# Patient Record
Sex: MALE | Race: WHITE | Employment: OTHER
[De-identification: names, ages, dates, MRNs, and addresses within clinical notes are randomized per-mention and may not be internally consistent; named-entity substitution may affect disease eponyms.]

---

## 2017-01-06 DIAGNOSIS — S82.391B: Primary | ICD-10-CM

## 2017-01-10 ENCOUNTER — OFFICE VISIT (OUTPATIENT)
Dept: ORTHOPEDIC SURGERY | Facility: CLINIC | Age: 82
End: 2017-01-10

## 2017-01-10 VITALS — WEIGHT: 198 LBS | BODY MASS INDEX: 28.35 KG/M2 | HEIGHT: 70 IN

## 2017-01-10 DIAGNOSIS — S82.391B: Primary | ICD-10-CM

## 2017-01-10 PROCEDURE — 99024 POSTOP FOLLOW-UP VISIT: CPT | Performed by: ORTHOPAEDIC SURGERY

## 2017-01-10 ASSESSMENT — ENCOUNTER SYMPTOMS
EYE DISCHARGE: 0
FACIAL SWELLING: 0
EYE REDNESS: 0
SHORTNESS OF BREATH: 0
NAUSEA: 0
CONSTIPATION: 0
DIARRHEA: 0
VOMITING: 0
COLOR CHANGE: 0
BACK PAIN: 0
CHEST TIGHTNESS: 0

## 2017-02-02 DIAGNOSIS — S82.391B: Primary | ICD-10-CM

## 2017-02-07 ENCOUNTER — OFFICE VISIT (OUTPATIENT)
Dept: ORTHOPEDIC SURGERY | Facility: CLINIC | Age: 82
End: 2017-02-07

## 2017-02-07 VITALS — HEIGHT: 70 IN | WEIGHT: 196.21 LBS | BODY MASS INDEX: 28.09 KG/M2

## 2017-02-07 DIAGNOSIS — S82.191B: Primary | ICD-10-CM

## 2017-02-07 PROCEDURE — 99024 POSTOP FOLLOW-UP VISIT: CPT | Performed by: ORTHOPAEDIC SURGERY

## 2017-02-07 PROCEDURE — 1123F ACP DISCUSS/DSCN MKR DOCD: CPT | Performed by: ORTHOPAEDIC SURGERY

## 2017-02-07 PROCEDURE — G8484 FLU IMMUNIZE NO ADMIN: HCPCS | Performed by: ORTHOPAEDIC SURGERY

## 2017-02-07 PROCEDURE — G8428 CUR MEDS NOT DOCUMENT: HCPCS | Performed by: ORTHOPAEDIC SURGERY

## 2017-02-07 PROCEDURE — G8420 CALC BMI NORM PARAMETERS: HCPCS | Performed by: ORTHOPAEDIC SURGERY

## 2017-02-07 PROCEDURE — 4040F PNEUMOC VAC/ADMIN/RCVD: CPT | Performed by: ORTHOPAEDIC SURGERY

## 2017-02-07 PROCEDURE — 1036F TOBACCO NON-USER: CPT | Performed by: ORTHOPAEDIC SURGERY

## 2017-03-14 ENCOUNTER — OFFICE VISIT (OUTPATIENT)
Dept: ORTHOPEDIC SURGERY | Age: 82
End: 2017-03-14
Payer: MEDICARE

## 2017-03-14 DIAGNOSIS — S82.191B: Primary | ICD-10-CM

## 2017-03-14 PROCEDURE — G8428 CUR MEDS NOT DOCUMENT: HCPCS | Performed by: ORTHOPAEDIC SURGERY

## 2017-03-14 PROCEDURE — 1123F ACP DISCUSS/DSCN MKR DOCD: CPT | Performed by: ORTHOPAEDIC SURGERY

## 2017-03-14 PROCEDURE — 99213 OFFICE O/P EST LOW 20 MIN: CPT | Performed by: ORTHOPAEDIC SURGERY

## 2017-03-14 PROCEDURE — G8420 CALC BMI NORM PARAMETERS: HCPCS | Performed by: ORTHOPAEDIC SURGERY

## 2017-03-14 PROCEDURE — 1036F TOBACCO NON-USER: CPT | Performed by: ORTHOPAEDIC SURGERY

## 2017-03-14 PROCEDURE — 4040F PNEUMOC VAC/ADMIN/RCVD: CPT | Performed by: ORTHOPAEDIC SURGERY

## 2017-03-14 PROCEDURE — G8484 FLU IMMUNIZE NO ADMIN: HCPCS | Performed by: ORTHOPAEDIC SURGERY

## 2017-03-14 ASSESSMENT — ENCOUNTER SYMPTOMS
VOMITING: 0
SHORTNESS OF BREATH: 0
NAUSEA: 0

## 2017-06-21 ENCOUNTER — OFFICE VISIT (OUTPATIENT)
Dept: ORTHOPEDIC SURGERY | Age: 82
End: 2017-06-21
Payer: MEDICARE

## 2017-06-21 VITALS — BODY MASS INDEX: 28.09 KG/M2 | HEIGHT: 70 IN | WEIGHT: 196.21 LBS

## 2017-06-21 DIAGNOSIS — L03.116 CELLULITIS OF LEFT LOWER EXTREMITY: Primary | ICD-10-CM

## 2017-06-21 PROCEDURE — 4040F PNEUMOC VAC/ADMIN/RCVD: CPT | Performed by: ORTHOPAEDIC SURGERY

## 2017-06-21 PROCEDURE — G8427 DOCREV CUR MEDS BY ELIG CLIN: HCPCS | Performed by: ORTHOPAEDIC SURGERY

## 2017-06-21 PROCEDURE — G8419 CALC BMI OUT NRM PARAM NOF/U: HCPCS | Performed by: ORTHOPAEDIC SURGERY

## 2017-06-21 PROCEDURE — 1036F TOBACCO NON-USER: CPT | Performed by: ORTHOPAEDIC SURGERY

## 2017-06-21 PROCEDURE — 1123F ACP DISCUSS/DSCN MKR DOCD: CPT | Performed by: ORTHOPAEDIC SURGERY

## 2017-06-21 PROCEDURE — 99212 OFFICE O/P EST SF 10 MIN: CPT | Performed by: ORTHOPAEDIC SURGERY

## 2017-06-21 RX ORDER — DOXYCYCLINE HYCLATE 100 MG
100 TABLET ORAL 2 TIMES DAILY
Qty: 20 TABLET | Refills: 0 | Status: SHIPPED | OUTPATIENT
Start: 2017-06-21 | End: 2017-07-01

## 2017-06-29 ENCOUNTER — OFFICE VISIT (OUTPATIENT)
Dept: ORTHOPEDIC SURGERY | Age: 82
End: 2017-06-29
Payer: MEDICARE

## 2017-06-29 VITALS — HEIGHT: 70 IN | WEIGHT: 196.21 LBS | BODY MASS INDEX: 28.09 KG/M2

## 2017-06-29 DIAGNOSIS — S82.891D CLOSED RIGHT ANKLE FRACTURE, WITH ROUTINE HEALING, SUBSEQUENT ENCOUNTER: Primary | ICD-10-CM

## 2017-06-29 PROCEDURE — G8419 CALC BMI OUT NRM PARAM NOF/U: HCPCS | Performed by: ORTHOPAEDIC SURGERY

## 2017-06-29 PROCEDURE — 1123F ACP DISCUSS/DSCN MKR DOCD: CPT | Performed by: ORTHOPAEDIC SURGERY

## 2017-06-29 PROCEDURE — G8428 CUR MEDS NOT DOCUMENT: HCPCS | Performed by: ORTHOPAEDIC SURGERY

## 2017-06-29 PROCEDURE — 1036F TOBACCO NON-USER: CPT | Performed by: ORTHOPAEDIC SURGERY

## 2017-06-29 PROCEDURE — 4040F PNEUMOC VAC/ADMIN/RCVD: CPT | Performed by: ORTHOPAEDIC SURGERY

## 2017-06-29 PROCEDURE — 99212 OFFICE O/P EST SF 10 MIN: CPT | Performed by: ORTHOPAEDIC SURGERY

## 2017-06-29 RX ORDER — DOXYCYCLINE HYCLATE 100 MG
100 TABLET ORAL 2 TIMES DAILY
Qty: 40 TABLET | Refills: 0 | Status: SHIPPED | OUTPATIENT
Start: 2017-06-29 | End: 2017-07-03 | Stop reason: CLARIF

## 2017-07-03 RX ORDER — DOXYCYCLINE HYCLATE 100 MG
100 TABLET ORAL 2 TIMES DAILY
Qty: 40 TABLET | Refills: 0 | Status: SHIPPED | OUTPATIENT
Start: 2017-07-03 | End: 2017-07-23

## 2024-08-05 ENCOUNTER — HOSPITAL ENCOUNTER (OUTPATIENT)
Dept: HOSPITAL 101 - LAB | Age: 89
Discharge: HOME | End: 2024-08-05
Payer: MEDICARE

## 2024-08-05 DIAGNOSIS — E55.9: ICD-10-CM

## 2024-08-05 DIAGNOSIS — I12.9: ICD-10-CM

## 2024-08-05 DIAGNOSIS — Z86.73: ICD-10-CM

## 2024-08-05 DIAGNOSIS — N18.31: ICD-10-CM

## 2024-08-05 DIAGNOSIS — R73.03: Primary | ICD-10-CM

## 2024-08-05 LAB
ADD MANUAL DIFF: NO
ALANINE AMINOTRANSFERASE: 13 U/L (ref 16–63)
ALBUMIN GLOBULIN RATIO: 1
ALBUMIN LEVEL: 3.4 G/DL (ref 3.4–5)
ALKALINE PHOSPHATASE: 85 U/L (ref 46–116)
ANION GAP: 13.3
ASPARTATE AMINO TRANSFERASE: 15 U/L (ref 15–37)
BLOOD UREA NITROGEN: 24 MG/DL (ref 7–18)
CALCIUM: 8.8 MG/DL (ref 8.5–10.1)
CARBON DIOXIDE: 27.6 MMOL/L (ref 21–32)
CHLORIDE: 103 MMOL/L (ref 98–107)
CO2 BLD-SCNC: 27.6 MMOL/L (ref 21–32)
ESTIMATED GFR (AFRICAN AMERICA: 55 (ref 60–?)
ESTIMATED GFR (NON-AFRICAN AME: 46 (ref 60–?)
GLOBULIN: 3.4 G/DL
GLUCOSE BLD-MCNC: 198 MG/DL (ref 74–106)
HCT VFR BLD CALC: 43.6 % (ref 42–54)
HEMATOCRIT: 43.6 % (ref 42–54)
HEMOGLOBIN: 13.9 G/DL (ref 14–18)
IMMATURE GRANULOCYTES ABS AUTO: 0.02 10^3/UL (ref 0–0.03)
IMMATURE GRANULOCYTES PCT AUTO: 0.3 % (ref 0–0.5)
LYMPHOCYTES  ABSOLUTE AUTO: 0.9 10^3/UL (ref 1.2–3.8)
MCV RBC: 93 FL (ref 80–94)
MEAN CORPUSCULAR HEMOGLOBIN: 29.6 PG (ref 25.9–34)
MEAN CORPUSCULAR HGB CONC: 31.9 G/DL (ref 29.9–35.2)
MEAN CORPUSCULAR VOLUME: 93 FL (ref 80–94)
PLATELET # BLD: 253 10^3/UL (ref 150–450)
PLATELET COUNT: 253 10^3/UL (ref 150–450)
POTASSIUM SERPLBLD-SCNC: 3.9 MMOL/L (ref 3.5–5.1)
POTASSIUM: 3.9 MMOL/L (ref 3.5–5.1)
RED BLOOD COUNT: 4.69 10^6/UL (ref 4.7–6.1)
SODIUM BLD-SCNC: 140 MMOL/L (ref 136–145)
SODIUM: 140 MMOL/L (ref 136–145)
THYROID STIMULATING HORMONE: 3.84 UIU/ML (ref 0.36–3.74)
TOTAL PROTEIN: 6.8 G/DL (ref 6.4–8.2)
WBC # BLD: 7.3 10^3/UL (ref 4–11)
WHITE BLOOD COUNT: 7.3 10^3/UL (ref 4–11)

## 2024-08-05 PROCEDURE — 36415 COLL VENOUS BLD VENIPUNCTURE: CPT

## 2024-08-05 PROCEDURE — 84443 ASSAY THYROID STIM HORMONE: CPT

## 2024-08-05 PROCEDURE — 85025 COMPLETE CBC W/AUTO DIFF WBC: CPT

## 2024-08-05 PROCEDURE — 80053 COMPREHEN METABOLIC PANEL: CPT

## 2024-09-25 ENCOUNTER — HOSPITAL ENCOUNTER (OUTPATIENT)
Dept: HOSPITAL 101 - LAB | Age: 89
Discharge: HOME | End: 2024-09-25
Payer: MEDICARE

## 2024-09-25 DIAGNOSIS — Z51.81: ICD-10-CM

## 2024-09-25 DIAGNOSIS — R41.0: Primary | ICD-10-CM

## 2024-09-25 LAB
ADD MANUAL DIFF: NO
ALANINE AMINOTRANSFERASE: 12 U/L (ref 16–63)
ALBUMIN GLOBULIN RATIO: 0.9
ALBUMIN LEVEL: 3.2 G/DL (ref 3.4–5)
ALKALINE PHOSPHATASE: 91 U/L (ref 46–116)
ANION GAP: 10.3
ASPARTATE AMINO TRANSFERASE: 15 U/L (ref 15–37)
BLOOD UREA NITROGEN: 22 MG/DL (ref 7–18)
CALCIUM: 8.6 MG/DL (ref 8.5–10.1)
CARBON DIOXIDE: 28.3 MMOL/L (ref 21–32)
CHLORIDE: 104 MMOL/L (ref 98–107)
CO2 BLD-SCNC: 28.3 MMOL/L (ref 21–32)
ESTIMATED GFR (AFRICAN AMERICA: >60 (ref 60–?)
ESTIMATED GFR (NON-AFRICAN AME: 54 (ref 60–?)
GLOBULIN: 3.5 G/DL
GLUCOSE BLD-MCNC: 220 MG/DL (ref 74–106)
GLUCOSE URINE UA: NEGATIVE MG/DL
HCT VFR BLD CALC: 39.6 % (ref 42–54)
HEMATOCRIT: 39.6 % (ref 42–54)
HEMOGLOBIN: 12.7 G/DL (ref 14–18)
IMMATURE GRANULOCYTES ABS AUTO: 0.01 10^3/UL (ref 0–0.03)
IMMATURE GRANULOCYTES PCT AUTO: 0.2 % (ref 0–0.5)
LYMPHOCYTES  ABSOLUTE AUTO: 0.6 10^3/UL (ref 1.2–3.8)
MCV RBC: 91 FL (ref 80–94)
MEAN CORPUSCULAR HEMOGLOBIN: 29.2 PG (ref 25.9–34)
MEAN CORPUSCULAR HGB CONC: 32.1 G/DL (ref 29.9–35.2)
MEAN CORPUSCULAR VOLUME: 91 FL (ref 80–94)
PLATELET # BLD: 234 10^3/UL (ref 150–450)
PLATELET COUNT: 234 10^3/UL (ref 150–450)
POTASSIUM SERPLBLD-SCNC: 3.6 MMOL/L (ref 3.5–5.1)
POTASSIUM: 3.6 MMOL/L (ref 3.5–5.1)
RED BLOOD COUNT: 4.35 10^6/UL (ref 4.7–6.1)
SODIUM BLD-SCNC: 139 MMOL/L (ref 136–145)
SODIUM: 139 MMOL/L (ref 136–145)
TOTAL PROTEIN: 6.7 G/DL (ref 6.4–8.2)
URINE CULTURE INDICATED: YES
WBC # BLD: 5.1 10^3/UL (ref 4–11)
WHITE BLOOD COUNT: 5.1 10^3/UL (ref 4–11)

## 2024-09-25 PROCEDURE — 80053 COMPREHEN METABOLIC PANEL: CPT

## 2024-09-25 PROCEDURE — 87186 SC STD MICRODIL/AGAR DIL: CPT

## 2024-09-25 PROCEDURE — 36415 COLL VENOUS BLD VENIPUNCTURE: CPT

## 2024-09-25 PROCEDURE — 81001 URINALYSIS AUTO W/SCOPE: CPT

## 2024-09-25 PROCEDURE — 87086 URINE CULTURE/COLONY COUNT: CPT

## 2024-09-25 PROCEDURE — 87150 DNA/RNA AMPLIFIED PROBE: CPT

## 2024-09-25 PROCEDURE — 85025 COMPLETE CBC W/AUTO DIFF WBC: CPT

## 2024-10-25 ENCOUNTER — HOSPITAL ENCOUNTER (OUTPATIENT)
Dept: HOSPITAL 101 - ER | Age: 89
Setting detail: OBSERVATION
LOS: 1 days | Discharge: NURSING, REHAB OR LONG-TERM CARE | End: 2024-10-26
Payer: MEDICARE

## 2024-10-25 VITALS — OXYGEN SATURATION: 95 %

## 2024-10-25 VITALS — SYSTOLIC BLOOD PRESSURE: 150 MMHG | OXYGEN SATURATION: 98 % | DIASTOLIC BLOOD PRESSURE: 64 MMHG | HEART RATE: 91 BPM

## 2024-10-25 VITALS — HEART RATE: 81 BPM | OXYGEN SATURATION: 95 % | SYSTOLIC BLOOD PRESSURE: 152 MMHG | DIASTOLIC BLOOD PRESSURE: 58 MMHG

## 2024-10-25 VITALS — DIASTOLIC BLOOD PRESSURE: 79 MMHG | SYSTOLIC BLOOD PRESSURE: 186 MMHG

## 2024-10-25 VITALS
OXYGEN SATURATION: 95 % | SYSTOLIC BLOOD PRESSURE: 170 MMHG | TEMPERATURE: 97.34 F | HEART RATE: 83 BPM | DIASTOLIC BLOOD PRESSURE: 90 MMHG

## 2024-10-25 VITALS — HEART RATE: 94 BPM | DIASTOLIC BLOOD PRESSURE: 76 MMHG | OXYGEN SATURATION: 94 % | SYSTOLIC BLOOD PRESSURE: 158 MMHG

## 2024-10-25 VITALS — OXYGEN SATURATION: 94 %

## 2024-10-25 VITALS
TEMPERATURE: 98.24 F | OXYGEN SATURATION: 93 % | SYSTOLIC BLOOD PRESSURE: 152 MMHG | DIASTOLIC BLOOD PRESSURE: 65 MMHG | HEART RATE: 96 BPM

## 2024-10-25 VITALS — HEART RATE: 86 BPM | OXYGEN SATURATION: 96 % | SYSTOLIC BLOOD PRESSURE: 183 MMHG | DIASTOLIC BLOOD PRESSURE: 80 MMHG

## 2024-10-25 VITALS — HEART RATE: 239 BPM | DIASTOLIC BLOOD PRESSURE: 61 MMHG | SYSTOLIC BLOOD PRESSURE: 161 MMHG | OXYGEN SATURATION: 95 %

## 2024-10-25 VITALS
TEMPERATURE: 98.06 F | OXYGEN SATURATION: 98 % | HEART RATE: 96 BPM | SYSTOLIC BLOOD PRESSURE: 150 MMHG | DIASTOLIC BLOOD PRESSURE: 66 MMHG

## 2024-10-25 VITALS — OXYGEN SATURATION: 99 %

## 2024-10-25 VITALS — OXYGEN SATURATION: 94 % | HEART RATE: 258 BPM

## 2024-10-25 VITALS
HEART RATE: 83 BPM | DIASTOLIC BLOOD PRESSURE: 87 MMHG | SYSTOLIC BLOOD PRESSURE: 174 MMHG | OXYGEN SATURATION: 94 % | TEMPERATURE: 98 F

## 2024-10-25 VITALS — DIASTOLIC BLOOD PRESSURE: 70 MMHG | SYSTOLIC BLOOD PRESSURE: 171 MMHG

## 2024-10-25 VITALS — OXYGEN SATURATION: 96 %

## 2024-10-25 VITALS — SYSTOLIC BLOOD PRESSURE: 151 MMHG | DIASTOLIC BLOOD PRESSURE: 69 MMHG

## 2024-10-25 VITALS — OXYGEN SATURATION: 97 %

## 2024-10-25 VITALS — OXYGEN SATURATION: 98 %

## 2024-10-25 VITALS
SYSTOLIC BLOOD PRESSURE: 171 MMHG | TEMPERATURE: 97.52 F | HEART RATE: 97 BPM | DIASTOLIC BLOOD PRESSURE: 70 MMHG | OXYGEN SATURATION: 97 %

## 2024-10-25 VITALS — BODY MASS INDEX: 25 KG/M2 | BODY MASS INDEX: 25.8 KG/M2

## 2024-10-25 VITALS — OXYGEN SATURATION: 94 % | HEART RATE: 248 BPM

## 2024-10-25 VITALS — SYSTOLIC BLOOD PRESSURE: 151 MMHG | DIASTOLIC BLOOD PRESSURE: 55 MMHG | HEART RATE: 100 BPM | OXYGEN SATURATION: 96 %

## 2024-10-25 DIAGNOSIS — G31.84: ICD-10-CM

## 2024-10-25 DIAGNOSIS — I12.9: ICD-10-CM

## 2024-10-25 DIAGNOSIS — G20.B2: ICD-10-CM

## 2024-10-25 DIAGNOSIS — N35.912: ICD-10-CM

## 2024-10-25 DIAGNOSIS — Z86.73: ICD-10-CM

## 2024-10-25 DIAGNOSIS — N13.6: Primary | ICD-10-CM

## 2024-10-25 DIAGNOSIS — N18.31: ICD-10-CM

## 2024-10-25 LAB
ADD MANUAL DIFF: NO
ANION GAP: 16
BLOOD UREA NITROGEN: 28 MG/DL (ref 7–18)
CALCIUM: 9.3 MG/DL (ref 8.5–10.1)
CARBON DIOXIDE: 23.5 MMOL/L (ref 21–32)
CAST SEEN?: (no result) #/LPF
CHLORIDE: 106 MMOL/L (ref 98–107)
CO2 BLD-SCNC: 23.5 MMOL/L (ref 21–32)
ESTIMATED GFR (AFRICAN AMERICA: 48
ESTIMATED GFR (NON-AFRICAN AME: 39
GLUCOSE BLD-MCNC: 143 MG/DL (ref 74–106)
GLUCOSE URINE UA: NEGATIVE MG/DL
HCT VFR BLD CALC: 42.5 % (ref 42–54)
HEMATOCRIT: 42.5 % (ref 42–54)
HEMOGLOBIN: 14.1 G/DL (ref 14–18)
IMMATURE GRANULOCYTES ABS AUTO: 0.05 10^3/UL (ref 0–0.03)
IMMATURE GRANULOCYTES PCT AUTO: 0.4 % (ref 0–0.5)
LACTATE/LACTIC ACID: 1.2 MMOL/L (ref 0.4–2)
LYMPHOCYTES  ABSOLUTE AUTO: 1 10^3/UL (ref 1.2–3.8)
MCV RBC: 89.3 FL (ref 80–94)
MEAN CORPUSCULAR HEMOGLOBIN: 29.6 PG (ref 25.9–34)
MEAN CORPUSCULAR HGB CONC: 33.2 G/DL (ref 29.9–35.2)
MEAN CORPUSCULAR VOLUME: 89.3 FL (ref 80–94)
PLATELET # BLD: 251 10^3/UL (ref 150–450)
PLATELET COUNT: 251 10^3/UL (ref 150–450)
POTASSIUM SERPLBLD-SCNC: 4.5 MMOL/L (ref 3.5–5.1)
POTASSIUM: 4.5 MMOL/L (ref 3.5–5.1)
RED BLOOD COUNT: 4.76 10^6/UL (ref 4.7–6.1)
SODIUM BLD-SCNC: 141 MMOL/L (ref 136–145)
SODIUM: 141 MMOL/L (ref 136–145)
URINE CULTURE INDICATED: YES
WBC # BLD: 11.9 10^3/UL (ref 4–11)
WHITE BLOOD COUNT: 11.9 10^3/UL (ref 4–11)

## 2024-10-25 PROCEDURE — 96365 THER/PROPH/DIAG IV INF INIT: CPT

## 2024-10-25 PROCEDURE — 52356 CYSTO/URETERO W/LITHOTRIPSY: CPT

## 2024-10-25 PROCEDURE — 87086 URINE CULTURE/COLONY COUNT: CPT

## 2024-10-25 PROCEDURE — 82365 CALCULUS SPECTROSCOPY: CPT

## 2024-10-25 PROCEDURE — 83605 ASSAY OF LACTIC ACID: CPT

## 2024-10-25 PROCEDURE — 99285 EMERGENCY DEPT VISIT HI MDM: CPT

## 2024-10-25 PROCEDURE — 85007 BL SMEAR W/DIFF WBC COUNT: CPT

## 2024-10-25 PROCEDURE — 76000 FLUOROSCOPY <1 HR PHYS/QHP: CPT

## 2024-10-25 PROCEDURE — 85025 COMPLETE CBC W/AUTO DIFF WBC: CPT

## 2024-10-25 PROCEDURE — 85027 COMPLETE CBC AUTOMATED: CPT

## 2024-10-25 PROCEDURE — 36415 COLL VENOUS BLD VENIPUNCTURE: CPT

## 2024-10-25 PROCEDURE — 80048 BASIC METABOLIC PNL TOTAL CA: CPT

## 2024-10-25 PROCEDURE — 99999: CPT

## 2024-10-25 PROCEDURE — 93005 ELECTROCARDIOGRAM TRACING: CPT

## 2024-10-25 PROCEDURE — 80053 COMPREHEN METABOLIC PANEL: CPT

## 2024-10-25 PROCEDURE — 74176 CT ABD & PELVIS W/O CONTRAST: CPT

## 2024-10-25 PROCEDURE — G0378 HOSPITAL OBSERVATION PER HR: HCPCS

## 2024-10-25 PROCEDURE — 96375 TX/PRO/DX INJ NEW DRUG ADDON: CPT

## 2024-10-25 PROCEDURE — 94761 N-INVAS EAR/PLS OXIMETRY MLT: CPT

## 2024-10-25 PROCEDURE — 81001 URINALYSIS AUTO W/SCOPE: CPT

## 2024-10-25 RX ADMIN — MORPHINE SULFATE 2 MG: 2 INJECTION, SOLUTION INTRAMUSCULAR; INTRAVENOUS at 21:44

## 2024-10-25 RX ADMIN — HYDRALAZINE HYDROCHLORIDE 10 MG: 20 INJECTION, SOLUTION INTRAMUSCULAR; INTRAVENOUS at 13:19

## 2024-10-25 RX ADMIN — AMANTADINE HYDROCHLORIDE 100 MG: 100 CAPSULE ORAL at 21:42

## 2024-10-25 RX ADMIN — MORPHINE SULFATE 2 MG: 2 INJECTION, SOLUTION INTRAMUSCULAR; INTRAVENOUS at 10:22

## 2024-10-25 RX ADMIN — TAMSULOSIN HYDROCHLORIDE 0.4 MG: 0.4 CAPSULE ORAL at 10:22

## 2024-10-25 RX ADMIN — CEFAZOLIN SODIUM 100 GM: 1 SOLUTION INTRAVENOUS at 08:50

## 2024-10-25 NOTE — ED_ITS
HPI - Abdominal Pain    
General    
Chief Complaint: Abdominal Pain    
Stated Complaint: OTHER    
Time Seen by Provider: 10/25/24 05:29    
Source: patient    
Mode of arrival: ambulance    
Limitations: no limitations    
History of Present Illness    
HPI narrative:     
89-year-old male presented to the emergency department and was initially seen by  
Dr. Tinsley and signed out to me after discussing the case with him thoroughly.    
Please see his full history and physical exam.    
Related Data    
                                Home Medications    
    
    
    
?Medication ?Instructions ?Recorded ?Confirmed    
     
amantadine HCl 100 mg capsule 100 mg PO DAILY 10/22/24 10/22/24    
     
amlodipine 10 mg tablet 10 mg PO DAILY 10/22/24 10/22/24    
     
aspirin 81 mg capsule 81 mg PO DAILY 10/22/24 10/22/24    
     
carbidopa 25 mg-levodopa 250 mg 1 tab PO BID 10/22/24 10/22/24    
    
tablet       
     
cholecalciferol (vitamin D3) 10 400 unit PO DAILY 10/22/24 10/22/24    
    
mcg (400 unit) capsule       
     
polyethylene glycol 3350 17 17 g PO DAILY 10/24/24 10/24/24    
    
gram/dose oral powder (Miralax)       
     
tolterodine 4 mg capsule,extended 4 mg PO DAILY 10/24/24 10/24/24    
    
release 24 hr       
    
    
    
                                    Allergies    
    
    
    
Allergy/AdvReac Type Severity Reaction Status Date / Time    
     
No Known Drug Allergies Allergy   Verified 10/22/24 14:35    
    
    
    
    
[facility]    
Medical History (Updated 10/25/24 @ 08:42 by Kristopher Cleveland MD)    
    
Anemia    
   ?D64.9 - Anemia, unspecified (ICD-10)    
Hard of hearing    
   ?H91.90 - Unspecified hearing loss, unspecified ear (ICD-10)    
History of external beam radiation therapy    
   ?Z92.3 - Personal history of irradiation (ICD-10)    
Urethral stricture    
   ?N35.919 - Unspecified urethral stricture, male, unspecified site (ICD-10)    
Stroke    
   ?I63.9 - Cerebral infarction, unspecified (ICD-10)    
Parkinson disease    
   ?G20.A1 - Parkinson's disease without dyskinesia, without mention of   
   fluctuations (ICD-10)    
Microscopic hematuria    
   ?R31.29 - Other microscopic hematuria (ICD-10)    
Hypertension    
   ?I10 - Essential (primary) hypertension (ICD-10)    
Hyperlipidemia    
   ?E78.5 - Hyperlipidemia, unspecified (ICD-10)    
Prostate cancer    
   ?C61 - Malignant neoplasm of prostate (ICD-10)    
Enlarged prostate    
   ?N40.0 - Benign prostatic hyperplasia without lower urinary tract symptoms   
   (ICD-10)    
    
    
Surgical History (Updated 10/22/24 @ 14:34 by Stephanie Lua RN)    
    
Hx of transurethral resection of prostate    
   ?Z98.890 - Other specified postprocedural states (ICD-10)    
   ?Z90.79 - Acquired absence of other genital organ(s) (ICD-10)    
H/O partial resection of colon    
   ?Z90.49 - Acquired absence of other specified parts of digestive tract (ICD-  
   10)    
Hx of cholecystectomy    
   ?Z90.49 - Acquired absence of other specified parts of digestive tract (ICD-  
   10)    
History of appendectomy    
   ?Z90.49 - Acquired absence of other specified parts of digestive tract (ICD-  
   10)    
S/P cystourethroscopy with dilation of urethral stricture    
   ?Z98.890 - Other specified postprocedural states (ICD-10)    
    
    
Family History (Updated 10/22/24 @ 14:34 by Stephanie Lua RN)    
Other   Family history of cancer    
    
    
    
Social History (Updated 10/24/24 @ 13:14 by Kari Rodríguez)    
Within the past year, how often did you have a drink containing alcohol:  never     
Score interpretation:  A score less than 4 is consistent with normal alcohol   
consumption.     
Smoking status:  Never smoker     
Non-prescribed substance use:  denies use     
Little interest or pleasure in doing things:  not at all     
Feeling down, depressed, or hopeless:  not at all     
    
    
    
Exam    
Constitutional    
Vital Signs, click to edit/add:     
    
                                Last Vital Signs    
    
    
    
Temp  97.4 F L  10/25/24 05:12    
     
Pulse  83   10/25/24 05:12    
     
Resp  20   10/25/24 05:12    
     
BP  186/79 H  10/25/24 08:06    
     
Pulse Ox  95   10/25/24 05:12    
     
O2 Del Method  Room Air  10/25/24 05:12    
    
    
    
    
    
Course    
Vital Signs    
Vital signs:     
    
                                   Vital Signs    
    
    
    
Temperature  97.4 F L  10/25/24 05:12    
     
Pulse Rate  83   10/25/24 05:12    
     
Respiratory Rate  20   10/25/24 05:12    
     
Blood Pressure  170/90 H  10/25/24 05:12    
     
Pulse Oximetry  95   10/25/24 05:12    
     
Oxygen Delivery Method  Room Air  10/25/24 05:12    
    
    
                                            
    
    
    
Temperature  97.4 F L  10/25/24 05:12    
     
Pulse Rate  83   10/25/24 05:12    
     
Respiratory Rate  20   10/25/24 05:12    
     
Blood Pressure  186/79 H  10/25/24 08:06    
     
Pulse Oximetry  95   10/25/24 05:12    
     
Oxygen Delivery Method  Room Air  10/25/24 05:12    
    
    
    
    
    
MDM - Abdominal Pain    
MDM Narrative    
Medical decision making narrative:     
CAT scan shows bilateral ureteral stones.  The 1 on the right is distal, at the   
UV junction and appears to be nonobstructing.  The 1 on the left is mid ureter   
and appears to be causing some obstruction.  A month ago his creatinine was 1.2   
and today it is 1.6.  Urine shows 2-5 white blood cells and urine culture is   
ordered and he was given IV Ancef.  I spoke to Dr. Chowdary and the patient will   
be admitted for probable procedure later today.  Treatment diagnosis and   
disposition were discussed with the patient.    
Differential Diagnosis    
Differential diagnosis: Likely abdominal pain, calculus of kidney, constipation,  
diverticulitis and gastroenteritis    
Lab Data    
Attestation: I reviewed the patient's lab results.    
Labs:     
    
                                   Lab Results    
    
    
    
  10/25/24 10/25/24 Range/Units    
    
  05:20 05:25     
     
WBC   11.9 H  (4.0-11.0)  10^3/uL    
     
RBC   4.76  (4.70-6.10)  10^6/uL    
     
Hgb   14.1  (14.0-18.0)  g/dL    
     
Hct   42.5  (42.0-54.0)  %    
     
MCV   89.3  (80.0-94.0)  fL    
     
MCH   29.6  (25.9-34.0)  pg    
     
MCHC   33.2  (29.9-35.2)  g/dL    
     
RDW   14.6  (11.0-15.0)  %    
     
Plt Count   251  (150-450)  10^3/uL    
     
MPV   9.7  (9.5-13.5)  fL    
     
Neut % (Auto)   85.1 H  (43.0-75.0)  %    
     
Lymph % (Auto)   8.5 L  (20.5-60.0)  %    
     
Mono % (Auto)   5.8  (1.7-12.0)  %    
     
Eos % (Auto)   0.1 L  (0.9-7.0)  %    
     
Baso % (Auto)   0.1 L  (0.2-2.0)  %    
     
Neut # (Auto)   10.1 H  (1.4-6.5)  10^3/uL    
     
Lymph # (Auto)   1.0 L  (1.2-3.8)  10^3/uL    
     
Mono # (Auto)   0.7  (0.3-0.8)  10^3/uL    
     
Eos # (Auto)   0.0  (0.0-0.7)  10^3/uL    
     
Baso # (Auto)   0.0  (0.0-0.1)  10^3/uL    
     
Abs Immat Gran (auto)   0.05 H  (0.00-0.03)  10^3/uL    
     
Imm/Tot Granulo (auto)   0.4  (0.0-0.5)  %    
     
Sodium   141  (136-145)  mmol/L    
     
Potassium   4.5  (3.5-5.1)  mmol/L    
     
Chloride   106  ()  mmol/L    
     
Carbon Dioxide   23.5  (21.0-32.0)  mmol/L    
     
Anion Gap   16.0      
     
BUN   28.0 H  (7.0-18.0)  mg/dL    
     
Creatinine   1.66 H  (0.70-1.30)  mg/dL    
     
Est GFR ( Amer)   48 L  (>=60 mL/min/1.73m^2)      
     
Est GFR (Non-Af Amer)   39 L  (>=60 mL/min/1.73m^2)      
     
BUN/Creatinine Ratio   16.9      
     
Glucose   143 H  ()  mg/dL    
     
Lactate   1.2  (0.4-2.0)  mmol/L    
     
Calcium   9.3  (8.5-10.1)  mg/dL    
     
Urine Color  Lt. yellow   (YELLOW)      
     
Urine Clarity  Clear   (CLEAR)      
     
Urine pH  5.5   (5.0-9.0)      
     
Ur Specific Gravity  >=1.030 A   (1.005-1.025)      
     
Urine Protein  Trace   (NEG/TRACE)  mg/dL    
     
Urine Glucose (UA)  Negative   (NEGATIVE)  mg/dL    
     
Urine Ketones  Trace A   (NEGATIVE)  mg/dL    
     
Urine Occult Blood  Large A   (NEGATIVE)      
     
Urine Nitrite  Negative   (NEGATIVE)      
     
Urine Bilirubin  Negative   (NEGATIVE)      
     
Urine Urobilinogen  0.2   (0.2-1.0)  EU/dL    
     
Ur Leukocyte Esterase  Negative   (NEGATIVE)      
     
Urine RBC  5-10 A   (0-2)  #/HPF    
     
Urine WBC  2-5 A   (NONE SEEN)  #/HPF    
     
Ur Squamous Epith Cells  Rare   (NONE/RARE)  #/LPF    
     
Urine Crystals  Seen A   (None Seen)  #/HPF    
     
Uric Acid Crystals  Rare       
     
Amorphous Sediment  Few       
     
Urine Bacteria  Small A   (NONE SEEN)  #/HPF    
     
Urine Casts  None seen   (NONE SEEN)  #/LPF    
     
Urine Mucus  None seen   (NONE SEEN)      
     
Ur Culture Indicated?  Yes       
    
    
    
    
Imaging Data    
CT scan - abdomen:     
      Radiologist's impression:     
    
ITS Impressions    
    
Abdomen/Pelvis CT  10/25/24 05:44    
IMPRESSION:     
     
1. Bilateral ureteral stones; nonobstructing on right, obstructing versus     
partially obstructing on left.    
2. Additional nonobstructing left kidney stones and benign-appearing cysts.    
     
     
Electronically authenticated by: PRUDENCE MOTA   Date: 10/25/2024  06:46    
    
    
    
    
    
Discharge Plan    
Discharge    
Chief Complaint: Abdominal Pain    
    
Clinical Impression:    
 Calculus of kidney    
    
    
Patient Disposition: Admitted as Observation    
    
Time of Disposition Decision: 08:42    
    
Condition: Good    
    
Prescriptions / Home Meds:    
No Action    
  amantadine HCl 100 mg capsule     
   100 mg PO DAILY     
  amlodipine 10 mg tablet     
   10 mg PO DAILY     
  aspirin 81 mg capsule     
   81 mg PO DAILY     
  carbidopa-levodopa  mg tablet     
   1 tab PO BID     
  cholecalciferol (vitamin D3) 10 mcg (400 unit) capsule     
   400 unit PO DAILY     
  polyethylene glycol 3350 [Miralax] 17 gram/dose powder     
   17 g PO DAILY     
  tolterodine 4 mg capsule,extended release 24hr     
   4 mg PO DAILY     
    
Print Language: English    
    
Referrals:    
MACIEL MERA [Primary Care Provider] - 1 week

## 2024-10-25 NOTE — ED_ITS
HPI - Abdominal Pain    
General    
Chief Complaint: Abdominal Pain    
Stated Complaint: OTHER    
Time Seen by Provider: 10/25/24 05:29    
Source: patient    
Mode of arrival: ambulance    
Limitations: no limitations    
History of Present Illness    
HPI narrative:     
presents from nursing home. Reportedly has UTI but complains of nausea from   
medication that was ordered and he refuses to take it. No vomiting or diarrhea.   
Points to LLQ as site of pain    
Related Data    
                                Home Medications    
    
    
    
?Medication ?Instructions ?Recorded ?Confirmed    
     
amantadine HCl 100 mg capsule 100 mg PO DAILY 10/22/24 10/22/24    
     
amlodipine 10 mg tablet 10 mg PO DAILY 10/22/24 10/22/24    
     
aspirin 81 mg capsule 81 mg PO DAILY 10/22/24 10/22/24    
     
carbidopa 25 mg-levodopa 250 mg 1 tab PO BID 10/22/24 10/22/24    
    
tablet       
     
cholecalciferol (vitamin D3) 10 400 unit PO DAILY 10/22/24 10/22/24    
    
mcg (400 unit) capsule       
     
polyethylene glycol 3350 17 17 g PO DAILY 10/24/24 10/24/24    
    
gram/dose oral powder (Miralax)       
     
tolterodine 4 mg capsule,extended 4 mg PO DAILY 10/24/24 10/24/24    
    
release 24 hr       
    
    
    
                                    Allergies    
    
    
    
Allergy/AdvReac Type Severity Reaction Status Date / Time    
     
No Known Drug Allergies Allergy   Verified 10/22/24 14:35    
    
    
    
    
Review of Systems    
    
    
ROS      
    
 Status of ROS                          10 or more systems reviewed and unremark    
able except as noted in     
history and below       
    
    
[facility]    
Medical History (Updated 10/25/24 @ 06:50 by Duane Tinsley MD)    
    
Anemia    
   ?D64.9 - Anemia, unspecified (ICD-10)    
Hard of hearing    
   ?H91.90 - Unspecified hearing loss, unspecified ear (ICD-10)    
History of external beam radiation therapy    
   ?Z92.3 - Personal history of irradiation (ICD-10)    
Urethral stricture    
   ?N35.919 - Unspecified urethral stricture, male, unspecified site (ICD-10)    
Stroke    
   ?I63.9 - Cerebral infarction, unspecified (ICD-10)    
Parkinson disease    
   ?G20.A1 - Parkinson's disease without dyskinesia, without mention of   
   fluctuations (ICD-10)    
Microscopic hematuria    
   ?R31.29 - Other microscopic hematuria (ICD-10)    
Hypertension    
   ?I10 - Essential (primary) hypertension (ICD-10)    
Hyperlipidemia    
   ?E78.5 - Hyperlipidemia, unspecified (ICD-10)    
Prostate cancer    
   ?C61 - Malignant neoplasm of prostate (ICD-10)    
Enlarged prostate    
   ?N40.0 - Benign prostatic hyperplasia without lower urinary tract symptoms   
   (ICD-10)    
    
    
Surgical History (Updated 10/22/24 @ 14:34 by Stephanie Lua RN)    
    
Hx of transurethral resection of prostate    
   ?Z98.890 - Other specified postprocedural states (ICD-10)    
   ?Z90.79 - Acquired absence of other genital organ(s) (ICD-10)    
H/O partial resection of colon    
   ?Z90.49 - Acquired absence of other specified parts of digestive tract (ICD-  
   10)    
Hx of cholecystectomy    
   ?Z90.49 - Acquired absence of other specified parts of digestive tract (ICD-  
   10)    
History of appendectomy    
   ?Z90.49 - Acquired absence of other specified parts of digestive tract (ICD-  
   10)    
S/P cystourethroscopy with dilation of urethral stricture    
   ?Z98.890 - Other specified postprocedural states (ICD-10)    
    
    
Family History (Updated 10/22/24 @ 14:34 by Stephanie Lua RN)    
Other   Family history of cancer    
    
    
    
Social History (Updated 10/24/24 @ 13:14 by Kari Rodríguez)    
Within the past year, how often did you have a drink containing alcohol:  never     
Score interpretation:  A score less than 4 is consistent with normal alcohol   
consumption.     
Smoking status:  Never smoker     
Non-prescribed substance use:  denies use     
Little interest or pleasure in doing things:  not at all     
Feeling down, depressed, or hopeless:  not at all     
    
    
    
Exam    
Constitutional    
Vital Signs, click to edit/add:     
    
                                Last Vital Signs    
    
    
    
Temp  97.4 F L  10/25/24 05:12    
     
Pulse  83   10/25/24 05:12    
     
Resp  20   10/25/24 05:12    
     
BP  170/90 H  10/25/24 05:12    
     
Pulse Ox  95   10/25/24 05:12    
     
O2 Del Method  Room Air  10/25/24 05:12    
    
    
    
    
Common normals: no apparent distress, average body habitus, oriented x3, healthy  
appearing, alert and well nourished    
[facility]    
Common normals: normocephalic and head/scalp atraumatic    
Eye    
Common normals: EOMs intact bilaterally and conjunctivae normal    
Respiratory    
Common normals: normal respiratory effort, no retractions and no use of   
accessory muscles    
Cardio    
Common normals: regular rate, regular rhythm, S1 normal heart sound and S2   
normal heart sound    
GI    
Common normals: Normal to inspection, nondistended, normoactive bowel sounds   
present and soft to palpation    
Other:     
mild LLQ tenderness    
Extremity    
Common normals: normal to inspection and full ROM    
Neuro    
Common normals: oriented x3, CN's II-XII intact bilaterally and moves all   
extremities    
Other:     
parkinson tremor right hand    
Psych    
Appearance: grossly normal    
    
Course    
Vital Signs    
Vital signs:     
    
                                   Vital Signs    
    
    
    
Temperature  97.4 F L  10/25/24 05:12    
     
Pulse Rate  83   10/25/24 05:12    
     
Respiratory Rate  20   10/25/24 05:12    
     
Blood Pressure  170/90 H  10/25/24 05:12    
     
Pulse Oximetry  95   10/25/24 05:12    
     
Oxygen Delivery Method  Room Air  10/25/24 05:12    
    
    
                                            
    
    
    
Temperature  97.4 F L  10/25/24 05:12    
     
Pulse Rate  83   10/25/24 05:12    
     
Respiratory Rate  20   10/25/24 05:12    
     
Blood Pressure  170/90 H  10/25/24 05:12    
     
Pulse Oximetry  95   10/25/24 05:12    
     
Oxygen Delivery Method  Room Air  10/25/24 05:12    
    
    
    
    
    
MDM - Abdominal Pain    
MDM Narrative    
Medical decision making narrative:     
patient presents from nursing home. Reportedly refusing to take medication for   
UTI due to side effect of nausea. Exam neg except for parkinsons tremor and LLQ   
tenderness. UA nitrite and leukocyte neg. CT abdomen ordered and pending. CT   
results pending    
Lab Data    
Labs:     
    
                                   Lab Results    
    
    
    
  10/25/24 10/25/24 Range/Units    
    
  05:20 05:25     
     
WBC   11.9 H  (4.0-11.0)  10^3/uL    
     
RBC   4.76  (4.70-6.10)  10^6/uL    
     
Hgb   14.1  (14.0-18.0)  g/dL    
     
Hct   42.5  (42.0-54.0)  %    
     
MCV   89.3  (80.0-94.0)  fL    
     
MCH   29.6  (25.9-34.0)  pg    
     
MCHC   33.2  (29.9-35.2)  g/dL    
     
RDW   14.6  (11.0-15.0)  %    
     
Plt Count   251  (150-450)  10^3/uL    
     
MPV   9.7  (9.5-13.5)  fL    
     
Neut % (Auto)   85.1 H  (43.0-75.0)  %    
     
Lymph % (Auto)   8.5 L  (20.5-60.0)  %    
     
Mono % (Auto)   5.8  (1.7-12.0)  %    
     
Eos % (Auto)   0.1 L  (0.9-7.0)  %    
     
Baso % (Auto)   0.1 L  (0.2-2.0)  %    
     
Neut # (Auto)   10.1 H  (1.4-6.5)  10^3/uL    
     
Lymph # (Auto)   1.0 L  (1.2-3.8)  10^3/uL    
     
Mono # (Auto)   0.7  (0.3-0.8)  10^3/uL    
     
Eos # (Auto)   0.0  (0.0-0.7)  10^3/uL    
     
Baso # (Auto)   0.0  (0.0-0.1)  10^3/uL    
     
Abs Immat Gran (auto)   0.05 H  (0.00-0.03)  10^3/uL    
     
Imm/Tot Granulo (auto)   0.4  (0.0-0.5)  %    
     
Sodium   141  (136-145)  mmol/L    
     
Potassium   4.5  (3.5-5.1)  mmol/L    
     
Chloride   106  ()  mmol/L    
     
Carbon Dioxide   23.5  (21.0-32.0)  mmol/L    
     
Anion Gap   16.0      
     
BUN   28.0 H  (7.0-18.0)  mg/dL    
     
Creatinine   1.66 H  (0.70-1.30)  mg/dL    
     
Est GFR ( Amer)   48 L  (>=60 mL/min/1.73m^2)      
     
Est GFR (Non-Af Amer)   39 L  (>=60 mL/min/1.73m^2)      
     
BUN/Creatinine Ratio   16.9      
     
Glucose   143 H  ()  mg/dL    
     
Lactate   1.2  (0.4-2.0)  mmol/L    
     
Calcium   9.3  (8.5-10.1)  mg/dL    
     
Urine Color  Lt. yellow   (YELLOW)      
     
Urine Clarity  Clear   (CLEAR)      
     
Urine pH  5.5   (5.0-9.0)      
     
Ur Specific Gravity  >=1.030 A   (1.005-1.025)      
     
Urine Protein  Trace   (NEG/TRACE)  mg/dL    
     
Urine Glucose (UA)  Negative   (NEGATIVE)  mg/dL    
     
Urine Ketones  Trace A   (NEGATIVE)  mg/dL    
     
Urine Occult Blood  Large A   (NEGATIVE)      
     
Urine Nitrite  Negative   (NEGATIVE)      
     
Urine Bilirubin  Negative   (NEGATIVE)      
     
Urine Urobilinogen  0.2   (0.2-1.0)  EU/dL    
     
Ur Leukocyte Esterase  Negative   (NEGATIVE)      
     
Urine RBC  5-10 A   (0-2)  #/HPF    
     
Urine WBC  2-5 A   (NONE SEEN)  #/HPF    
     
Ur Squamous Epith Cells  Rare   (NONE/RARE)  #/LPF    
     
Urine Crystals  Seen A   (None Seen)  #/HPF    
     
Uric Acid Crystals  Rare       
     
Amorphous Sediment  Few       
     
Urine Bacteria  Small A   (NONE SEEN)  #/HPF    
     
Urine Casts  None seen   (NONE SEEN)  #/LPF    
     
Urine Mucus  None seen   (NONE SEEN)      
     
Ur Culture Indicated?  Yes       
    
    
    
    
Imaging Data    
Chest x-ray:     
      Radiologist's impression:     
    
ITS Impressions    
    
Abdomen/Pelvis CT  10/25/24 05:44    
IMPRESSION:     
     
1. Bilateral ureteral stones; nonobstructing on right, obstructing versus     
partially obstructing on left.    
2. Additional nonobstructing left kidney stones and benign-appearing cysts.    
     
     
Electronically authenticated by: PRUDENCE MOTA   Date: 10/25/2024  06:46    
    
    
    
    
    
Discharge Plan    
Discharge    
Patient Disposition: Still a Patient

## 2024-10-25 NOTE — ED.[id number]
HPI - Abdominal Pain
General
Chief Complaint: Abdominal Pain
Stated Complaint: OTHER
Time Seen by Provider: 10/25/24 05:29
Source: patient
Mode of arrival: ambulance
Limitations: no limitations
History of Present Illness
HPI narrative: 
89-year-old male presented to the emergency department and was initially seen by Dr. Tinsley and signed out to me after discussing the case with him thoroughly.  Please see his full history and physical exam.
Related Data
Home Medications

?Medication ?Instructions ?Recorded ?Confirmed
amantadine HCl 100 mg capsule 100 mg PO DAILY 10/22/24 10/22/24
amlodipine 10 mg tablet 10 mg PO DAILY 10/22/24 10/22/24
aspirin 81 mg capsule 81 mg PO DAILY 10/22/24 10/22/24
carbidopa 25 mg-levodopa 250 mg 1 tab PO BID 10/22/24 10/22/24
tablet   
cholecalciferol (vitamin D3) 10 400 unit PO DAILY 10/22/24 10/22/24
mcg (400 unit) capsule   
polyethylene glycol 3350 17 17 g PO DAILY 10/24/24 10/24/24
gram/dose oral powder (Miralax)   
tolterodine 4 mg capsule,extended 4 mg PO DAILY 10/24/24 10/24/24
release 24 hr   


Allergies

Allergy/AdvReac Type Severity Reaction Status Date / Time
No Known Drug Allergies Allergy   Verified 10/22/24 14:35



[facility]
Medical History (Updated 10/25/24 @ 08:42 by Kristopher Cleveland MD)

Anemia
 ?D64.9 - Anemia, unspecified (ICD-10)
Hard of hearing
 ?H91.90 - Unspecified hearing loss, unspecified ear (ICD-10)
History of external beam radiation therapy
 ?Z92.3 - Personal history of irradiation (ICD-10)
Urethral stricture
 ?N35.919 - Unspecified urethral stricture, male, unspecified site (ICD-10)
Stroke
 ?I63.9 - Cerebral infarction, unspecified (ICD-10)
Parkinson disease
 ?G20.A1 - Parkinson's disease without dyskinesia, without mention of fluctuations (ICD-10)
Microscopic hematuria
 ?R31.29 - Other microscopic hematuria (ICD-10)
Hypertension
 ?I10 - Essential (primary) hypertension (ICD-10)
Hyperlipidemia
 ?E78.5 - Hyperlipidemia, unspecified (ICD-10)
Prostate cancer
 ?C61 - Malignant neoplasm of prostate (ICD-10)
Enlarged prostate
 ?N40.0 - Benign prostatic hyperplasia without lower urinary tract symptoms (ICD-10)


Surgical History (Updated 10/22/24 @ 14:34 by Stephanie Lua RN)

Hx of transurethral resection of prostate
 ?Z98.890 - Other specified postprocedural states (ICD-10)
 ?Z90.79 - Acquired absence of other genital organ(s) (ICD-10)
H/O partial resection of colon
 ?Z90.49 - Acquired absence of other specified parts of digestive tract (ICD-10)
Hx of cholecystectomy
 ?Z90.49 - Acquired absence of other specified parts of digestive tract (ICD-10)
History of appendectomy
 ?Z90.49 - Acquired absence of other specified parts of digestive tract (ICD-10)
S/P cystourethroscopy with dilation of urethral stricture
 ?Z98.890 - Other specified postprocedural states (ICD-10)


Family History (Updated 10/22/24 @ 14:34 by Stephanie Lua RN)
Other
 Family history of cancer



Social History (Updated 10/24/24 @ 13:14 by aKri Rodríguez)
Within the past year, how often did you have a drink containing alcohol:  never 
Score interpretation:  A score less than 4 is consistent with normal alcohol consumption. 
Smoking status:  Never smoker 
Non-prescribed substance use:  denies use 
Little interest or pleasure in doing things:  not at all 
Feeling down, depressed, or hopeless:  not at all 



Exam
Constitutional
Vital Signs, click to edit/add: 

Last Vital Signs

Temp  97.4 F L  10/25/24 05:12
Pulse  83   10/25/24 05:12
Resp  20   10/25/24 05:12
BP  186/79 H  10/25/24 08:06
Pulse Ox  95   10/25/24 05:12
O2 Del Method  Room Air  10/25/24 05:12




Course
Vital Signs
Vital signs: 

Vital Signs

Temperature  97.4 F L  10/25/24 05:12
Pulse Rate  83   10/25/24 05:12
Respiratory Rate  20   10/25/24 05:12
Blood Pressure  170/90 H  10/25/24 05:12
Pulse Oximetry  95   10/25/24 05:12
Oxygen Delivery Method  Room Air  10/25/24 05:12



Temperature  97.4 F L  10/25/24 05:12
Pulse Rate  83   10/25/24 05:12
Respiratory Rate  20   10/25/24 05:12
Blood Pressure  186/79 H  10/25/24 08:06
Pulse Oximetry  95   10/25/24 05:12
Oxygen Delivery Method  Room Air  10/25/24 05:12




MDM - Abdominal Pain
MDM Narrative
Medical decision making narrative: 
CAT scan shows bilateral ureteral stones.  The 1 on the right is distal, at the UV junction and appears to be nonobstructing.  The 1 on the left is mid ureter and appears to be causing some obstruction.  A month ago his creatinine was 1.2 and today 
it is 1.6.  Urine shows 2-5 white blood cells and urine culture is ordered and he was given IV Ancef.  I spoke to Dr. Chowdary and the patient will be admitted for probable procedure later today.  Treatment diagnosis and disposition were discussed 
with the patient.
Differential Diagnosis
Differential diagnosis: Likely abdominal pain, calculus of kidney, constipation, diverticulitis and gastroenteritis
Lab Data
Attestation: I reviewed the patient's lab results.
Labs: 

Lab Results

  10/25/24 10/25/24 Range/Units
  05:20 05:25 
WBC   11.9 H  (4.0-11.0)  10^3/uL
RBC   4.76  (4.70-6.10)  10^6/uL
Hgb   14.1  (14.0-18.0)  g/dL
Hct   42.5  (42.0-54.0)  %
MCV   89.3  (80.0-94.0)  fL
MCH   29.6  (25.9-34.0)  pg
MCHC   33.2  (29.9-35.2)  g/dL
RDW   14.6  (11.0-15.0)  %
Plt Count   251  (150-450)  10^3/uL
MPV   9.7  (9.5-13.5)  fL
Neut % (Auto)   85.1 H  (43.0-75.0)  %
Lymph % (Auto)   8.5 L  (20.5-60.0)  %
Mono % (Auto)   5.8  (1.7-12.0)  %
Eos % (Auto)   0.1 L  (0.9-7.0)  %
Baso % (Auto)   0.1 L  (0.2-2.0)  %
Neut # (Auto)   10.1 H  (1.4-6.5)  10^3/uL
Lymph # (Auto)   1.0 L  (1.2-3.8)  10^3/uL
Mono # (Auto)   0.7  (0.3-0.8)  10^3/uL
Eos # (Auto)   0.0  (0.0-0.7)  10^3/uL
Baso # (Auto)   0.0  (0.0-0.1)  10^3/uL
Abs Immat Gran (auto)   0.05 H  (0.00-0.03)  10^3/uL
Imm/Tot Granulo (auto)   0.4  (0.0-0.5)  %
Sodium   141  (136-145)  mmol/L
Potassium   4.5  (3.5-5.1)  mmol/L
Chloride   106  ()  mmol/L
Carbon Dioxide   23.5  (21.0-32.0)  mmol/L
Anion Gap   16.0  
BUN   28.0 H  (7.0-18.0)  mg/dL
Creatinine   1.66 H  (0.70-1.30)  mg/dL
Est GFR ( Amer)   48 L  (>=60 mL/min/1.73m^2)  
Est GFR (Non-Af Amer)   39 L  (>=60 mL/min/1.73m^2)  
BUN/Creatinine Ratio   16.9  
Glucose   143 H  ()  mg/dL
Lactate   1.2  (0.4-2.0)  mmol/L
Calcium   9.3  (8.5-10.1)  mg/dL
Urine Color  Lt. yellow   (YELLOW)  
Urine Clarity  Clear   (CLEAR)  
Urine pH  5.5   (5.0-9.0)  
Ur Specific Gravity  >=1.030 A   (1.005-1.025)  
Urine Protein  Trace   (NEG/TRACE)  mg/dL
Urine Glucose (UA)  Negative   (NEGATIVE)  mg/dL
Urine Ketones  Trace A   (NEGATIVE)  mg/dL
Urine Occult Blood  Large A   (NEGATIVE)  
Urine Nitrite  Negative   (NEGATIVE)  
Urine Bilirubin  Negative   (NEGATIVE)  
Urine Urobilinogen  0.2   (0.2-1.0)  EU/dL
Ur Leukocyte Esterase  Negative   (NEGATIVE)  
Urine RBC  5-10 A   (0-2)  #/HPF
Urine WBC  2-5 A   (NONE SEEN)  #/HPF
Ur Squamous Epith Cells  Rare   (NONE/RARE)  #/LPF
Urine Crystals  Seen A   (None Seen)  #/HPF
Uric Acid Crystals  Rare   
Amorphous Sediment  Few   
Urine Bacteria  Small A   (NONE SEEN)  #/HPF
Urine Casts  None seen   (NONE SEEN)  #/LPF
Urine Mucus  None seen   (NONE SEEN)  
Ur Culture Indicated?  Yes   



Imaging Data
CT scan - abdomen: 
      Radiologist's impression: 

ITS Impressions

Abdomen/Pelvis CT  10/25/24 05:44
IMPRESSION: 
 
1. Bilateral ureteral stones; nonobstructing on right, obstructing versus 
partially obstructing on left.
2. Additional nonobstructing left kidney stones and benign-appearing cysts.
 
 
Electronically authenticated by: PRUDENCE MOTA   Date: 10/25/2024  06:46





Discharge Plan
Discharge
Chief Complaint: Abdominal Pain

Clinical Impression:
 Calculus of kidney


Patient Disposition: Admitted as Observation

Time of Disposition Decision: 08:42

Condition: Good

Prescriptions / Home Meds:
No Action
  amantadine HCl 100 mg capsule 
   100 mg PO DAILY 
  amlodipine 10 mg tablet 
   10 mg PO DAILY 
  aspirin 81 mg capsule 
   81 mg PO DAILY 
  carbidopa-levodopa  mg tablet 
   1 tab PO BID 
  cholecalciferol (vitamin D3) 10 mcg (400 unit) capsule 
   400 unit PO DAILY 
  polyethylene glycol 3350 [Miralax] 17 gram/dose powder 
   17 g PO DAILY 
  tolterodine 4 mg capsule,extended release 24hr 
   4 mg PO DAILY 

Print Language: English

Referrals:
MACIEL MERA [Primary Care Provider] - 1 week

## 2024-10-25 NOTE — XMS_ITS
Comprehensive CCD (C-CDA v2.1)  
  
                          Created on: 2024  
  
  
TERRANCE FERRARA  
External Reference #: CDR,PersonID:[id number]  
: 1935  
Sex: Male  
  
Demographics  
  
  
                                        Address             [address] [place], [place]  [id number]  
   
                                        Home Phone          [phone]  
   
                                        Preferred Language  en  
   
                                        Marital Status      Single  
   
                                        [employer] Affiliation Unknown  
   
                                        Race                White  
   
                                        Ethnic Group        Not  or Lati  
no  
  
  
Author  
  
  
                                        Organization        [facility]  
  
  
Care Team Providers  
  
  
                                Care Team Member Name Role            Phone  
   
                                Unavailable     Primary Care Provider UnavailWILLIAM Hernández Attending       Unavailable  
   
                                BRYON ARZOLA  Primary Care    Unavailable  
   
                                Bryon Arzola  Primary Care    Unavailable  
   
                                MD Dashawn Rand Admitting       Unavailab  
MD Dashawn Kee Attending       Unavailab  
Bryon Henry  Primary Care    Unavailable  
   
                                Roger Martinez     Admitting       Unavailable  
   
                                Roger Martinez     Attending       Unavailable  
   
                                Bryon Arzola  Primary Care    Unavailable  
   
                                Ari Marte Admitting       Unavailab  
Ari Spicer Attending       Unavailab  
Bryon Henry  Primary Care    Unavailable  
   
                                Stalin Fabian    Admitting       Unavailable  
   
                                Stalin Fabian    Attending       Unavailable  
   
                                Bryon Arzola  Primary Care    Unavailable  
   
                                [employer]ern[employer]      Unavailable  
   
                                Gonzalo Mendez    Admitting       Unavailable  
   
                                Gonzalo Mendez    Attending       Unavailable  
   
                                Bryon Arzola  Attending       Unavailable  
   
                                Bryon Arzola  Primary Care    Unavailable  
   
                                Bryon Arzola  Attending       Unavailable  
   
                                Bryon Arzola  Primary Care    Unavailable  
   
                                Bryon Arzola  Primary Care    Unavailable  
   
                                Bryon Arzola  Attending       Unavailable  
   
                                Max Gloria Admitting       Unavailable  
   
                                Max Gloria Attending       Unavailable  
   
                                Bryon Arzola  Primary Care    Unavailable  
   
                                Bryon Arzola  Primary Care    Unavailable  
   
                                Luis Carlos Fabianun    Admitting       Unavailable  
   
                                Luis Carlos Fabianun    Attending       Unavailable  
   
                                BHAVIN EARL      Attending       Unavailable  
   
                                BRYON ARZOLA  Primary Care Physician Unavailab  
Reny Navarrete Attending       Unavailable  
   
                                Reny Blanton Attending       Unavailable  
   
                                Pantera ABDALLA Attending       Unavailable  
  
  
  
Allergies  
  
  
                                                    Allergy   
Classification                          Reported   
Allergen(s)               Allergy Type              Date of   
Onset                     Reaction(s)               Facility  
   
                                                      
(1 source)                              No Known   
Medication   
Allergies;   
Translations:   
[No Known   
Medication   
Allergies]                              Propensity to   
adverse   
reactions   
(disorder)                                                  [facility]   
Repository  
  
  
  
Medications  
Current Medications  
  
  
  
                      Medication Drug Class(es) Dates      Sig (Normalized) Sig   
(Original)  
   
                                                    amantadine   
hydrochloride 100   
mg oral tablet  
(1 source)                              Influenza A M2   
Protein Inhibitor                       Start:   
10-                                          amantadine 100   
mg Tab 100 mg =   
1 tab(s),   
Refills(s) 0   
Start Date:   
10/3/24 Status:   
Ordered  
   
                                                    amLODIPine 10 mg   
oral tablet  
(2 sources)                             Dihydropyridine   
Calcium Channel   
Blocker                                 Start:   
2020                              take 10 mg by   
mouth once daily                        amlodipine 10   
mg, Oral, Daily   
Start Date:   
20 Status:   
Ordered  
   
                                                    aspirin 81 mg oral   
tablet  
(2 sources)                             Platelet Aggregation   
Inhibitor,   
Nonsteroidal   
Anti-inflammatory   
Drug                                    Start:   
2020                              take 1 tablet by   
mouth once daily                        aspirin 81 mg   
oral tablet 81   
mg = 1 tab(s),   
Oral, Daily   
Start Date:   
20 Status:   
Ordered  
   
                                                    carbidopa 25 mg /   
levodopa 250 mg   
oral tablet  
(2 sources)                             Aromatic Amino Acid   
Decarboxylation   
Inhibitor, Aromatic   
Amino Acid                              Start:   
2020                                          carbidopa-levodo  
pa 25 mg-250 mg   
Tab 1 tab(s)   
Start Date:   
20 Status:   
Ordered  
   
                                                    cefTRIAXone 1000 mg   
injection  
(1 source)                              Cephalosporin   
Antibacterial                           Start:   
10-                              take 1 g   
intravenously once   
daily                                   cefTRIAXone 50   
mg/kg/day IVPB   
Pediatric   
Buretrol 1 gm,   
Refills(s) 0   
Start Date:   
10/3/24 Status:   
Ordered  
   
                                                    clopidogrel 75 mg   
oral tablet  
(1 source)                              P2Y12 Platelet   
Inhibitor                               Start:   
2020                              take 1 tablet by   
mouth once daily                        clopidogrel 75   
mg Tab 75 mg = 1   
tab(s), Oral,   
Daily Start   
Date: 20   
Status: Ordered  
   
                                                    lidocaine 1%   
Injection 20 mL  
(1 source)                                          Start:   
10-                              inject 0.05 g   
intravenously once                      lidocaine 1%   
Injection 20 mL   
0.05 gm, 5 mL,   
IV, Once, 5 mL,   
Refill(s) 0   
Start Date:   
10/3/24 Status:   
Ordered  
   
                                                    metoprolol tartrate   
50 mg oral tablet  
(1 source)                              beta-Adrenergic   
Blocker                                 Start:   
2020                              take 1 tablet by   
mouth once daily                        Metoprolol   
succinate 50 mg   
ER Tablet 50 mg,   
Oral, Daily   
Start Date:   
20 Status:   
Ordered  
   
                                                    nystatin 100 unt/mg   
topical powder  
(1 source)                Polyene Antifungal        Start:   
10-                                          nystatin Top   
100,000 units/g   
Pwdr 1 usman,   
Refill(s) 0   
Start Date:   
10/3/24 Status:   
Ordered  
   
                                                    Vitamin D3  
(2 sources)                                         Start:   
2020                                          Vitamin D3 400   
International_Un  
it, Daily Start   
Date: 20   
Status: Ordered  
  
  
  
Completed/Discontinued Medications  
  
  
  
                      Medication Drug Class(es) Dates      Sig (Normalized) Sig   
(Original)  
   
                                                    ciprofloxacin 500 mg   
oral tablet  
(2 sources)                             Quinolone   
Antimicrobial                           Start:   
2019                              take 1 tablet by   
mouth once daily                        Cipro 500 mg Tab   
500 mg = 1   
tab(s), Oral,   
Daily, Take 1   
tablet the day   
before the   
procedure and 1   
tablet after the   
procedure, # 2   
tab(s),   
Refills(s) 0,   
[facility]   
 Start Date:   
20 Status:   
Ordered  
  
  
  
Problems  
  
  
                                                    Problem   
Classification  Problem         Date            Documented Date Episodic/Chronic  
   
                                                    Acute cerebrovascular   
disease  
(2 sources)                             Cerebrovascular   
accident                                2019          Chronic  
   
                                                    Cancer of prostate  
(3 sources)                             History of malignant   
neoplasm of prostate;   
Translations:   
[Personal history of   
malignant neoplasm of   
prostate]                               Onset:   
10-                12-                Episodic  
   
                                                    Disorders of lipid   
metabolism  
(2 sources)     Hyperlipidemia                  2019      Chronic  
   
                                                    Essential   
hypertension  
(2 sources)     Hypertensive disorder                 2019      Chronic  
   
                                                    Genitourinary   
symptoms and   
ill-defined   
conditions  
(3 sources)                             Microscopic   
hematuria;   
Translations:   
[Retention of urine]                    Onset:   
10-                12-                Episodic  
   
                                                    Hyperplasia of   
prostate  
(2 sources)                             Benign prostatic   
hypertrophy with   
outflow obstruction                     2019          Chronic  
   
                                                    Other diseases of   
bladder and urethra  
(2 sources)     Urethral stricture                 2019      Episodic  
   
                                                    Other diseases of   
bladder and urethra  
(1 source)                              Male urethral   
stricture;   
Translations:   
[Unspecified urethral   
stricture, male,   
unspecified site]                       Onset:   
10-                                          Episodic  
   
                                                    Parkinson`s disease  
(2 sources)     Parkinson's disease                 2020      Chronic  
   
                                                    Residual codes;   
unclassified  
(1 source)                              Pain, unspecified;   
Translations: [Pain,   
unspecified]                            Onset:   
2024                                          Episodic  
   
                                                    Residual codes;   
unclassified  
(2 sources)                             Family history of   
prostate cancer                         2019          Episodic  
   
                                                    Urinary tract   
infections  
(1 source)                              Urinary tract   
infectious disease;   
Translations:   
[Urinary tract   
infection, site not   
specified]                              Onset:   
10-                                          Episodic  
  
  
  
Results  
  
  
                          Test Name    Value        Interpretation Reference   
[facility]                                   Facility  
   
                                                    Urology Office/Clinic Noteon  
 10-   
   
                                                    Urology   
Office/Clinic Note                      Urology Office/Clinic   
Note  
Chief Complaint  
urinary leaking and   
urge incontinence  
HPI Staff  
Previous PRW last seen   
20 for   
cystoscopy with UD   
with Oneal sounds.   
Pt states that he will   
begin leaking urine as   
soon as he gets the   
urge to go and one he   
begins leaking he is   
unable to stop. He   
wears a brief and   
changes a couple times   
daily.  
Dysuria: denies  
Incomplete bladder   
emptying: pt feels he   
is emptying  
Hematuria: denies  
Frequency: pt states   
not at all  
Urgency: yes once he   
gets the urge to void   
he will begin leaking   
in his brief  
Nocturia: 2x  
Stream: states a good   
stream doesn't feel   
that he strains  
Leaking: yes  
Post void dripping:   
yes  
Wearing pads/ Depends:   
depends and has to   
change a couple of   
times daily  
Urge incontinence: yes  
Stress incontinence:   
denies  
Incontinence without   
Sensory Awareness:   
denies  
Abdominal pain: denies  
Flank pain: denies  
Sexual complaints:   
denies  
History of Present   
Illness  
Staff HPI reviewed and   
agree.  
Review of Systems  
PHQ Score  
Initial Depression   
Screen Score: 0 SCORE  
no fever, chills,   
malaise, myalgia.  
no rash/lesions.  
no chest pain,   
palpitations, or SOB.  
no abdominal pain,   
nausea, vomiting.  
no unilateral calf   
swelling, redness,   
pain  
Physical Exam  
Vitals & Measurements  
T: 37 ?C(Temporal   
Artery) HR:   
85(Peripheral) RR: 16   
BP: 131/65  
HT: 72 in HT: 183 cm   
WT: 88 kg WT: 193.6 lb   
BMI: 26.28  
General: nontoxic,   
well-nourished,   
appears stated age  
Mouth: moist mucosa  
Lungs: normal   
respiratory effort  
Cardio: regular rate,   
good distal perfusion  
Abdomen: nondistended,   
no suprapubic   
distention or   
tenderness, no CVA   
tenderness  
Neurologic: Grossly   
normal  
Skin: No rashes or   
suspicious lesions  
Assessment/Plan  
NP referral for   
urinary retention. Pt   
resides at [facility]. Presents   
today with aide. Pt is   
previous PRW pt, has   
not been seen since   
.  
24 - BUN 22, Cre   
1.26, GFR 54, Glu 220  
1. Urinary retention   
(R33.9: Retention of   
urine, unspecified)  
UA today negative for   
blood or infection  
PVR today initially   
over 200, then then   
voided 60ml and PVR   
187ml  
IPSS 14  
Pt here today with   
complaints of   
incontinence, urgency   
and weak stream. Pt   
reports that once he   
starts urinating, he   
is unable to stop. Pt   
reports that he   
urinates about 6 times   
per day. Advised pt to   
complete timed and   
double voids. Pt is   
currently taking   
Tolterodine 4mg PO   
daily. Discussed   
potential cognitive   
side effects of this   
medication in addition   
to his Parkinson's   
diagnosis. Also   
advised patient that   
this could be   
contributing to his   
urine retention. Also   
had a long discussion   
with patient regarding   
his previous history   
of needing dilated,   
which can also be the   
cause for his weak   
stream and retention.   
Pt verbalizes   
understanding. Offered   
to stop Tolterodine   
based on these reasons   
and start   
Myrbetriq/Gemtesa. Pt   
would like to stop   
Tolterodine and not   
restart an OAB med. I   
advised pt that we can   
trial this and if he   
has worsening   
incontinence and   
urgency then the   
nursing home is to   
call us and we can   
start   
Myrbetriq/Gemtesa. Pt   
agreeable. Pt also   
agreeable to repeat   
cysto/UD with Dr. Abdalla to help his   
stream, retention and   
recurrent infections.  
Will schedule Cysto   
with UD. The procedure   
risks, benefits,   
details, and treatment   
alternatives have been   
discussed with the   
patient. These include   
bleeding, infection,   
recurrent scar in over   
50%, need for repeat   
dilation or other   
procedures, no symptom   
relief with dilation,   
among others. Full   
informed consent has   
been obtained. Will   
order Local   
anesthesia.  
-Stop Tolterodine 4mg   
PO daily  
-If patient   
experiences worsening   
OAB symptoms, will   
restart   
Myrbetriq/Gemtesa  
-Schedule cysto/UD   
with Dr. Abdalla  
-Increase fluids,   
avoid bladder   
irritants  
-Timed voids, double   
voids  
-F/U after cysto/UD  
Ordered:  
E&M of New Patient   
High 60-74 Min 23649  
  
2. Urethral stricture   
in male (N35.919:   
Unspecified urethral   
stricture, male,   
unspecified site)  
3/3/11 -   
cystourethroscopy with   
internal male   
urethrotomy  
19 cysto/UD with   
sounds  
20 cysto/UD -   
tight urethra,   
prostatic urethra has   
recurrent strictures   
at penile mid and near   
bulb, short and open.   
urethra was dilated to   
30Fr with sounds.   
Bladder moderate   
trabeculations (2+),   
no tumors or stones.   
20Fr alvarado placed, to   
be removed in office   
next day  
Pt was scheduled for   
repeat cysto/UD in   
2020 but pt   
cancelled due to pain   
from the catheter   
after cysto in 2020. Pt reported he   
was going to see   
another urologist.   
From the history I was   
able to obtain from   
patient, pt then saw   
Dr. Gloria but unsure if   
any   
procedures/dilations   
were completed.  
-See #1  
Ordered:  
E&M of New Patient   
High 60-74 Min 53900  
  
3. Recurrent UTI   
(N39.0: Urinary tract   
infection, site not   
specified)  
24 cx - 50-100k   
E. Coli, initially   
treated with Bactrim   
then switched to   
Ceftriaxone 1gm IM   
daily (will end on   
10/5)  
Pt has had multiple ER   
visits for recurrent   
infections over the   
last year.  
-See #1  
Ordered:  
E&M of New (more   
content not   
included)...        Normal                                  [facility]  
   
                                        Comment on above:   Result Comment: Elec  
tronically Signed By: Franny MEDINA, Reny HU\.br\Date and Time Signed: 10/03/24 15:30 EDT   
   
                                                    C Bloodon 2024   
   
                                        C Blood             Corynebacterium   
species (diptheroids)  
No KALEIGH performed on   
this organism  
Probable skin   
contamination  
Results called to   
Adalid Ortega on 2S  
at 730 by TB and   
results read back for   
confirmation on 24            [facility]  
   
                                        Comment on above:   Performed By: #### 6  
[id number] ####[facility] (DEFAULT)[address]   
[place] [address]   
   
                                                    Outside Recordson 2024  
   
   
                                        Outside Records     [id number]  
01  
[id number]#1.00  
OTGTIFF             [facility]  
   
                                        Outside Records     [id number]  
40  
[id number]#1.00  
OTIFF             [facility]  
   
                                                    Coding Summaryon 2024   
   
                      Coding Summary            [facility]  
   
                                                    C Bloodon 2024   
   
                      C Blood    No growth at 5 Days [facility]  
   
                                        Comment on above:   Performed By: #### 6  
[id number] ####[facility] (DEFAULT)[address]   
[place] [address]   
   
                                                    Coding Summaryon 2024   
   
                      Coding Summary            [facility]  
   
                                                    Consent Formson 2024   
   
                                        Consent Forms       [id number]  
03  
[id number]#1.00  
OTGTIFF             [facility]  
   
                                                    Outside Recordson 2024  
   
   
                                        Outside Records     [id number]  
03  
[id number]#1.00  
[facility]  
   
                                                    Provider Orderson 2024  
   
   
                                        Provider Orders     [id number]  
03  
[id number]#1.00  
[facility]  
   
                                                    .Auto Diff 1on 2024   
   
                      Auto Mono % 14 %       High       1-12       [facility]  
   
                                        Comment on above:   Performed By: #### 1  
[id number] ####[facility] (DEFAULT)[address] [place] [address]   
   
                      Baso Abs#  0.0 x10    Normal     0.0-0.2    [facility]  
   
                                        Comment on above:   Performed By: #### 1  
[id number] ####[facility] (DEFAULT)[address] [place] [address]   
   
                                                    Basophils/100 WBC   
(Bld)           0.6 %           Normal          0.2-2.0         [facility]  
   
                                        Comment on above:   Performed By: #### 1  
[id number], [id number] ####[facility] (DEFAULT)[address] [place] [address]   
   
                      Eos Abs#   0.2 x10    Normal     0.0-0.4    [facility]  
   
                                        Comment on above:   Performed By: #### 1  
[id number], [id number] ####[facility] (DEFAULT)[address] [place] [address]   
   
                                                    Eosinophils/100 WBC   
(Bld)           3.8 %           Normal          0.9-4.0         [facility]  
   
                                        Comment on above:   Performed By: #### 1  
[id number], [id number] ####[facility] (DEFAULT)[address] [place] [address]   
   
                      Lymph Abs# 0.9 x10    Low        1.3-2.9    [facility]  
   
                                        Comment on above:   Performed By: #### 1  
[id number], [id number] ####[facility] (DEFAULT)[address] [place] [address]   
   
                                                    Lymphocytes/100 WBC   
(Bld)           18 %            Normal          14-48           [facility]  
   
                                        Comment on above:   Performed By: #### 1  
[id number], [id number], [id number] ####[facility] (DEFAULT)[address] [place] [address]   
   
                      Mono Abs#  0.7 x10    Normal     0.0-0.8    [facility]  
   
                                        Comment on above:   Performed By: #### 1  
[id number], [id number] ####[facility] (DEFAULT)[address] [place] [address]   
   
                      Neut Abs#  3.3 x10    Normal     1.5-9.2    [facility]  
   
                                        Comment on above:   Performed By: #### 1  
[id number], [id number] ####[facility] (DEFAULT)[address] [place] [address]   
   
                                                    Neutrophils/100 WBC   
(Bld)           64 %            Normal          44-88           [facility]  
   
                                        Comment on above:   Performed By: #### 1  
[id number], [id number], [id number] ####[facility] (DEFAULT)[address] [place] [address]   
   
                                                    BMP Standardon 2024   
   
                                eGFR Non AA     55 mL/min/1.73m2 Invalid   
Interpretation   
Code                                                [facility]  
   
                                        Comment on above:   Performed By: #### 1  
[id number], [id number], [id number] ####[facility] (DEFAULT)[address] [place] [address]   
   
                                eGFR AA         >60             Invalid   
Interpretation   
Code                                                [facility]  
   
                                        Comment on above:   Performed By: #### 1  
[id number], [id number], [id number] ####[facility] (DEFAULT)[address] [place] [address]   
   
                                                    Anion gap   
[Moles/Vol]     11.1 mmol/L     Normal          5.0-19.0        [facility]  
   
                                        Comment on above:   Performed By: #### 1  
[id number], [id number], [id number] ####[facility] (DEFAULT)[address] [place] [address]   
   
                      Calcium [Mass/Vol] 8.7 mg/dL  Low        8.9-10.3   [facility]  
   
                                        Comment on above:   Performed By: #### 1  
[id number], 5955739, [id number] ####[facility] (DEFAULT)[address] [place] [address]   
   
                      Chloride [Moles/Vol] 106 mmol/L Normal     101-111    [facility]  
   
                                        Comment on above:   Performed By: #### 1  
[id number], 7601601, [id number] ####[facility] (DEFAULT)[address] [place] [address]   
   
                      CO2 [Moles/Vol] 28 mmol/L  Normal     21-32      [facility]  
   
                                        Comment on above:   Performed By: #### 1  
[id number], 4858044, 95834904 ####[facility] (DEFAULT)[address] [place] [address]   
   
                                                    Creatinine   
[Mass/Vol]      1.24 mg/dL      Normal          0.90-1.30       [facility]  
   
                                        Comment on above:   Performed By: #### 1  
[id number], 7772506, 05233358 ####[facility] (DEFAULT)[address] [place] [address]   
   
                      Glucose [Mass/Vol] 117.0 mg/dL Normal     74.0-118.0 [facility]  
   
                                        Comment on above:   Performed By: #### 1  
[id number], 5370234, 63010636 ####[facility] (DEFAULT)[address] [place] [address]   
   
                                Osmolality      288 mOsm/L      Invalid   
Interpretation   
Code                                                [facility]  
   
                                        Comment on above:   Performed By: #### 1  
[id number], 2455551, [id number] ####[facility] (DEFAULT)[address] [place] [address]   
   
                                                    Potassium   
[Moles/Vol]     4.1 mmol/L      Normal          3.6-5.1         [facility]  
   
                                        Comment on above:   Performed By: #### 1  
[id number], 8176559, 69363382 ####[facility] (DEFAULT)[address] [place] [address]   
   
                      Sodium [Moles/Vol] 141.0 mmol/L Normal     136.0-144.0 [facility]  
   
                                        Comment on above:   Performed By: #### 1  
[id number], 9052448, 07722088 ####[facility] (DEFAULT)[address] [place] [address]   
   
                                                    Urea nitrogen   
[Mass/Vol]      28 mg/dL        High            8-26            [facility]  
   
                                        Comment on above:   Performed By: #### 1  
[id number], 0377536, 42991953 ####[facility] (DEFAULT)[address] [place] [address]   
   
                                                    Urea   
nitrogen/Creatinine   
[Mass ratio]    22.5 mg/mg      High            4.6-16.2        [facility]  
   
                                        Comment on above:   Performed By: #### 1  
[id number], 9035925, 57083648 ####[facility] (DEFAULT)[address] [place] [address]   
   
                                                    CBC w/ Auto Diffon    
   
                                                    Erythrocyte   
distribution width   
(RBC) [Ratio]   15.5 %          High            11.5-15.0       [facility]  
   
                                        Comment on above:   Performed By: #### 1  
[id number], 8312777, 43898689 ####[facility] (DEFAULT)[address] [place] [address]   
   
                                                    Hematocrit (Bld)   
[Volume fraction] 37.2 %          Normal          34.8-51.9       [facility]  
   
                                        Comment on above:   Performed By: #### 1  
[id number], 7247424, 94666417 ####[facility] (DEFAULT)[address] [place] [address]   
   
                                                    Hemoglobin (Bld)   
[Mass/Vol]      12.5 g/dL       Normal          11.8-17.7       [facility]  
   
                                        Comment on above:   Performed By: #### 1  
[id number], 6820006, [id number] ####[facility] (DEFAULT)[address] [place] [address]   
   
                                Man Diff?       Auto            Invalid   
Interpretation   
Code                                                [facility]  
   
                                        Comment on above:   Performed By: #### 1  
[id number], 7367950, [id number] ####[facility] (DEFAULT)[address] [place] [address]   
   
                                                    MCH (RBC) [Entitic   
mass]           30 pg           Normal          24-34           [facility]  
   
                                        Comment on above:   Performed By: #### 1  
[id number], 5732016, 48639413 ####[facility] (DEFAULT)[address] [place] [address]   
   
                                                    MCHC (RBC)   
[Mass/Vol]      34 g/dL         Normal          26-37           [facility]  
   
                                        Comment on above:   Performed By: #### 1  
[id number], 6021853, 15749644 ####[facility] (DEFAULT)[address] [place] [address]   
   
                                                    MCV (RBC) [Entitic   
vol]            89 fL           Normal                    [facility]  
   
                                        Comment on above:   Performed By: #### 1  
[id number], 1174698, 09635821 ####[facility] (DEFAULT)[address] [place] [address]   
   
                      Platelet   177 x10    Normal     138-427    [facility]  
   
                                        Comment on above:   Performed By: #### 1  
[id number], 0079454, 40039713 ####[facility] (DEFAULT)[address] [place] [address]   
   
                                                    Platelet mean volume   
(Bld) [Entitic vol] 8.2 fL          Normal          6.3-10.2        [facility]  
   
                                        Comment on above:   Performed By: #### 1  
[id number], 6620926, 69021131 ####[facility] (DEFAULT)[address] [place] [address]   
   
                      RBC        4.19 x10   Normal     3.70-5.30  [facility]  
   
                                        Comment on above:   Performed By: #### 1  
[id number], 6451499, 74968468 ####[facility] (DEFAULT)[address] [place] [address]   
   
                      WBC        5.2 x10    Normal     3.5-10.5   [facility]  
   
                                        Comment on above:   Performed By: #### 1  
[id number], 7263115, 97107014 ####[facility] (DEFAULT)[address] [place] [address]   
   
                                                    Discharge Noteon 2024   
   
                      Discharge Note            Normal                [facility]  
   
                                                    Education Noteon 2024   
   
                      Education Note            Normal                [facility]  
   
                                                    Inpatient Clinical Summaryon  
 2024   
   
                                                    Inpatient Clinical   
Summary                         Normal                          [facility]  
   
                                                    Inpatient Patient Summaryon   
2024   
   
                                                    Inpatient Patient   
Summary                         Normal                          [facility]  
   
                                                    Pharmacy Noteon 2024   
   
                      Pharmacy Note            Normal                [facility]  
   
                                                    Progress Note - Nurseon    
   
                                                    Progress Note -   
Nurse                                   report called to   
noemy pa at [place],   
[phone].  
[Electronically Signed   
on: 2024 14:08   
EDT]  
______________________  
__________________  
Vandana Quijano RN  
  
  
  
  
[Verified on:   
2024 14:08 EDT]  
______________________  
__________________  
Vandana Quijano RN       Normal                                  [facility]  
   
                                                    .Auto Diff 12024   
   
                      Auto Mono % 10 %       Normal     1-12       [facility]  
   
                                        Comment on above:   Performed By: #### 1  
[id number], [id number], 6411240 ####[facility] (DEFAULT)[address] [place] [address]   
   
                      Baso Abs#  0.0 x10    Normal     0.0-0.2    [facility]  
   
                                        Comment on above:   Performed By: #### 1  
[id number], [id number], 9376974 ####[facility] (DEFAULT)[address] [place] [address]   
   
                                                    Basophils/100 WBC   
(Bld)           0.3 %           Normal          0.2-2.0         [facility]  
   
                                        Comment on above:   Performed By: #### 1  
[id number], [id number], 0014136 ####[facility] (DEFAULT)[address] [place] [address]   
   
                      Eos Abs#   0.1 x10    Normal     0.0-0.4    [facility]  
   
                                        Comment on above:   Performed By: #### 1  
[id number], [id number], 3529893 ####[facility] (DEFAULT)[address] [place] [address]   
   
                                                    Eosinophils/100 WBC   
(Bld)           1.4 %           Normal          0.9-4.0         [facility]  
   
                                        Comment on above:   Performed By: #### 1  
[id number], [id number], 3587551 ####[facility] (DEFAULT)[address] [place] [address]   
   
                      Lymph Abs# 1.0 x10    Low        1.3-2.9    [facility]  
   
                                        Comment on above:   Performed By: #### 1  
[id number], [id number], 7842057 ####[facility] (DEFAULT)[address] [place] [address] 12886   
   
                                                    Lymphocytes/100 WBC   
(Bld)           14 %            Normal          14-48           [facility]  
   
                                        Comment on above:   Performed By: #### 1  
[id number], [id number], 3103909 ####[facility] (DEFAULT)[address] [place] [address] 76[address]   
   
                      Mono Abs#  0.7 x10    Normal     0.0-0.8    [facility]  
   
                                        Comment on above:   Performed By: #### 1  
5392733, 5614056326, 8446173 ####[facility] (DEFAULT)[address] [place] [address]   
   
                      Neut Abs#  5.5 x10    Normal     1.5-9.2    [facility]  
   
                                        Comment on above:   Performed By: #### 1  
[id number], [id number], 9889575 ####[facility] (DEFAULT)[address] [place] [address]   
   
                                                    Neutrophils/100 WBC   
(Bld)           75 %            Normal          44-88           [facility]  
   
                                        Comment on above:   Performed By: #### 1  
[id number], [id number], 5120395 ####[facility] (DEFAULT)[address] [place] [address]   
   
                                                    BMP Standardon 2024   
   
                                eGFR Non AA     56 mL/min/1.73m2 Invalid   
Interpretation   
Code                                                [facility]  
   
                                        Comment on above:   Performed By: #### 1  
[id number], [id number], 2918196 ####[facility] (DEFAULT)[address] [place] [address]   
   
                                eGFR AA         >60             Invalid   
Interpretation   
Code                                                [facility]  
   
                                        Comment on above:   Performed By: #### 1  
0458428, [id number], 3731983 ####[facility] (DEFAULT)[address] [place] [address]   
   
                                                    Anion gap   
[Moles/Vol]     10.0 mmol/L     Normal          5.0-19.0        [facility]  
   
                                        Comment on above:   Performed By: #### 1  
4791413, 2073180084, 3687675 ####[facility] (DEFAULT)[address] [place] [address] 26526   
   
                      Calcium [Mass/Vol] 9.1 mg/dL  Normal     8.9-10.3   [facility]  
   
                                        Comment on above:   Performed By: #### 1  
7338777, [id number], 0984975 ####[facility] (DEFAULT)[address] [place] [address]   
   
                      Chloride [Moles/Vol] 107 mmol/L Normal     101-111    [facility]  
   
                                        Comment on above:   Performed By: #### 1  
8898247, 1462049691, 6489142 ####[facility] (DEFAULT)[address] [place] [address]   
   
                      CO2 [Moles/Vol] 25 mmol/L  Normal     21-32      [facility]  
   
                                        Comment on above:   Performed By: #### 1  
8289204, 6117392173, 0421823 ####[facility] (DEFAULT)[address] [place] [address] 989[address]   
   
                                                    Creatinine   
[Mass/Vol]      1.23 mg/dL      Normal          0.90-1.30       [facility]  
   
                                        Comment on above:   Performed By: #### 1  
7976919, [id number], 5721126 ####[facility] (DEFAULT)[address] [place] [address]78   
   
                      Glucose [Mass/Vol] 126.0 mg/dL High       74.0-118.0 [facility]  
   
                                        Comment on above:   Performed By: #### 1  
7613352, 1093424912, 3825444 ####[facility] (DEFAULT)[address] [place] [address]   
   
                                Osmolality      282 mOsm/L      Invalid   
Interpretation   
Code                                                [facility]  
   
                                        Comment on above:   Performed By: #### 1  
7860318, [id number], 1043631 ####[facility] (DEFAULT)[address] [place] [address]   
   
                                                    Potassium   
[Moles/Vol]     4.0 mmol/L      Normal          3.6-5.1         [facility]  
   
                                        Comment on above:   Performed By: #### 1  
9969385, [id number], 5922603 ####[facility] (DEFAULT)[address] [place] [address]   
   
                      Sodium [Moles/Vol] 138.0 mmol/L Normal     136.0-144.0 [facility]  
   
                                        Comment on above:   Performed By: #### 1  
6980437, 7999371163, 1334360 ####[facility] (DEFAULT)[address] [place] [address]   
   
                                                    Urea nitrogen   
[Mass/Vol]      26 mg/dL        Normal          8-26            [facility]  
   
                                        Comment on above:   Performed By: #### 1  
6982078, [id number], 4002809 ####[facility] (DEFAULT)[address] [place] [address]   
   
                                                    Urea   
nitrogen/Creatinine   
[Mass ratio]    21.1 mg/mg      High            4.6-16.2        [facility]  
   
                                        Comment on above:   Performed By: #### 1  
5128901, [id number], 8483062 ####[facility] (DEFAULT)[address] [place] [address]   
   
                                                    CBC w/ Auto Diffon   
4   
   
                                                    Erythrocyte   
distribution width   
(RBC) [Ratio]   15.2 %          High            11.5-15.0       [facility]  
   
                                        Comment on above:   Performed By: #### 1  
[id number], 5989612059, 1022569 ####[facility] (DEFAULT)[address] [place] [address]   
   
                                                    Hematocrit (Bld)   
[Volume fraction] 39.0 %          Normal          34.8-51.9       [facility]  
   
                                        Comment on above:   Performed By: #### 1  
[id number], [id number], 6985432 ####[facility] (DEFAULT)[address] [place], [place] [address]   
   
                                                    Hemoglobin (Bld)   
[Mass/Vol]      13.1 g/dL       Normal          11.8-17.7       [facility]  
   
                                        Comment on above:   Performed By: #### 1  
8232730, 2630692820, 2547148 ####[facility] (DEFAULT)[address] [place], [place] [address]   
   
                                Man Diff?       Auto            Invalid   
Interpretation   
Code                                                [facility]  
   
                                        Comment on above:   Performed By: #### 1  
9868915, [id number], 2166225 ####[facility] (DEFAULT)[address] [place] [address]14[address]   
   
                                                    MCH (RBC) [Entitic   
mass]           30 pg           Normal          24-34           [facility]  
   
                                        Comment on above:   Performed By: #### 1  
7227081, [id number], 1736945 ####[facility] (DEFAULT)[address] [place] [address] 23077   
   
                                                    MCHC (RBC)   
[Mass/Vol]      34 g/dL         Normal          26-37           [facility]  
   
                                        Comment on above:   Performed By: #### 1  
8011196, 3373730344, 6991579 ####[facility] (DEFAULT)[address] [place] [address]   
   
                                                    MCV (RBC) [Entitic   
vol]            90 fL           Normal                    [facility]  
   
                                        Comment on above:   Performed By: #### 1  
[id number], [id number], 0419161 ####[facility] (DEFAULT)[address] [place] [address]   
   
                      Platelet   187 x10    Normal     138-427    [facility]  
   
                                        Comment on above:   Performed By: #### 1  
0859450, 8267688686, 4499134 ####[facility] (DEFAULT)[address] [place] [address] 060[address]   
   
                                                    Platelet mean volume   
(Bld) [Entitic vol] 8.1 fL          Normal          6.3-10.2        [facility]  
   
                                        Comment on above:   Performed By: #### 1  
8073559, [id number], 2577946 ####[facility] (DEFAULT)[address] [place] [address]957   
   
                      RBC        4.35 x10   Normal     3.70-5.30  [facility]  
   
                                        Comment on above:   Performed By: #### 1  
1902935, 2684652492, 3700519 ####[facility] (DEFAULT)[address] [place] [address] 089[address]   
   
                      WBC        7.3 x10    Normal     3.5-10.5   [facility]  
   
                                        Comment on above:   Performed By: #### 1  
[id number], [id number], 6701333 ####[facility] (DEFAULT)[address] [place] [address]   
   
                                                    Nutrition Noteon 2024   
   
                                        Nutrition Note      note po intake has   
been good - 100% most   
meals; will continue   
to monitor po and   
assist with any   
changes prn. ts     Normal                                  [facility]  
   
                                                    RPR, Rfx Qn RPR/Confirm TP L  
Con 2024   
   
                                RPR LC          Non-Reactive    Invalid   
Interpretation   
Code                      Non Reactive              [facility]  
   
                                        Comment on above:   Result Comment: Perf  
ormed At: [facility] [id number] [place], [place] [id number] Vincent PhD Ph:[phone]   
   
                                                            Performed By: #### 1  
[id number], [id number], 9142109, 3139496,   
[id number] ####[facility] (DEFAULT)[address] [place] [address]   
   
                                                    .Auto Diff 1on 2024   
   
                      Auto Mono % 9 %        Normal     1-12       [facility]  
   
                                        Comment on above:   Performed By: #### 1  
[id number], [id number], 1217120, 9224054,   
[id number] ####[facility] (DEFAULT)[address] [place] [address]   
   
                      Baso Abs#  0.0 x10    Normal     0.0-0.2    [facility]  
   
                                        Comment on above:   Performed By: #### 1  
[id number], [id number], 5818894, 5571641,   
[id number] ####[facility] (DEFAULT)[address] [place] [address]   
   
                                                    Basophils/100 WBC   
(Bld)           0.4 %           Normal          0.2-2.0         [facility]  
   
                                        Comment on above:   Performed By: #### 1  
7715628, [id number], 3064131, 5030465,   
[id number] ####[facility] (DEFAULT)[address] [place] [address]   
   
                      Eos Abs#   0.1 x10    Normal     0.0-0.4    [facility]  
   
                                        Comment on above:   Performed By: #### 1  
[id number], 9659368776, 8404246, 7083382,   
[id number] ####[facility] (DEFAULT)[address] [place] [address]   
   
                                                    Eosinophils/100 WBC   
(Bld)           1.6 %           Normal          0.9-4.0         [facility]  
   
                                        Comment on above:   Performed By: #### 1  
[id number], [id number], 9844454, 4978100,   
[id number] ####[facility] (DEFAULT)[address] [place] [address]   
   
                      Lymph Abs# 0.7 x10    Low        1.3-2.9    [facility]  
   
                                        Comment on above:   Performed By: #### 1  
3179439, 8323552483, 4384135, 3776856,   
[id number] ####[facility] (DEFAULT)[address] [place] [address]   
   
                                                    Lymphocytes/100 WBC   
(Bld)           12 %            Low             14-48           [facility]  
   
                                        Comment on above:   Performed By: #### 1  
6161416, 2944700032, 1344726, 8195068,   
[id number] ####[facility] (DEFAULT)[address] [place] [address]   
   
                      Mono Abs#  0.5 x10    Normal     0.0-0.8    [facility]  
   
                                        Comment on above:   Performed By: #### 1  
6583806, 7226071553, 4467504, 2948683,   
[id number] ####[facility] (DEFAULT)[address] [place] [address]   
   
                      Neut Abs#  4.7 x10    Normal     1.5-9.2    [facility]  
   
                                        Comment on above:   Performed By: #### 1  
1693274, 4073511653, 2117161, 6130132,   
[id number] ####[facility] (DEFAULT)[address] [place] [address]   
   
                                                    Neutrophils/100 WBC   
(Bld)           77 %            Normal          44-88           [facility]  
   
                                        Comment on above:   Performed By: #### 1  
5424107, 4720955069, 3279975, 0320568,   
[id number] ####[facility] (DEFAULT)[address] [place] [address] Standardon 2024   
   
                                eGFR Non AA     >60             Invalid   
Interpretation   
Code                                                [facility]  
   
                                        Comment on above:   Performed By: #### 1  
4656214, 2363454307, 6785069, 2083184,   
[id number] ####[facility] (DEFAULT)[address] [place] [address]   
   
                                eGFR AA         >60             Invalid   
Interpretation   
Code                                                [facility]  
   
                                        Comment on above:   Performed By: #### 1  
2169788, 4944593713, 0745988, 1804298,   
[id number] ####[facility] (DEFAULT)[address] [place] [address]   
   
                                                    Anion gap   
[Moles/Vol]     10.9 mmol/L     Normal          5.0-19.0        [facility]  
   
                                        Comment on above:   Performed By: #### 1  
6209091, 7211490794, 3060601, 6464682,   
[id number] ####[facility] (DEFAULT)[address] [place] [address]   
   
                      Calcium [Mass/Vol] 8.8 mg/dL  Low        8.9-10.3   [facility]  
   
                                        Comment on above:   Performed By: #### 1  
4788926, 5289787953, 6471330, 6663275,   
[id number] ####[facility] (DEFAULT)[address] [place] [address]   
   
                      Chloride [Moles/Vol] 108 mmol/L Normal     101-111    [facility]  
   
                                        Comment on above:   Performed By: #### 1  
5202213, 8457749456, 9574143, 3459573,   
[id number] ####[facility] (DEFAULT)[address] [place] [address]   
   
                      CO2 [Moles/Vol] 25 mmol/L  Normal     21-32      [facility]  
   
                                        Comment on above:   Performed By: #### 1  
0548143, 3342523607, 8608608, 8143695,   
[id number] ####[facility] (DEFAULT)[address] [place] [address]   
   
                                                    Creatinine   
[Mass/Vol]      1.11 mg/dL      Normal          0.90-1.30       [facility]  
   
                                        Comment on above:   Performed By: #### 1  
0710989, 2469698647, 9902929, 7092582,   
[id number] ####[facility] (DEFAULT)[address] [place] [address]   
   
                      Glucose [Mass/Vol] 123.0 mg/dL High       74.0-118.0 [facility]  
   
                                        Comment on above:   Performed By: #### 1  
9405777, 0638463472, 1251026, 8760084,   
[id number] ####[facility] (DEFAULT)[address] [place] [address]   
   
                                Osmolality      286 mOsm/L      Invalid   
Interpretation   
Code                                                [facility]  
   
                                        Comment on above:   Performed By: #### 1  
1006437, 0119282896, 0617977, 3886672,   
[id number] ####[facility] (DEFAULT)[address] [place] [address]   
   
                                                    Potassium   
[Moles/Vol]     3.9 mmol/L      Normal          3.6-5.1         [facility]  
   
                                        Comment on above:   Performed By: #### 1  
2354909, 8270311587, 9317384, 6387981,   
[id number] ####[facility] (DEFAULT)[address] [place] [address]   
   
                      Sodium [Moles/Vol] 140.0 mmol/L Normal     136.0-144.0 [facility]  
   
                                        Comment on above:   Performed By: #### 1  
4829768, 3373173409, 2324593, 2894307,   
[id number] ####[facility] (DEFAULT)[address] [place] [address]   
   
                                                    Urea nitrogen   
[Mass/Vol]      27 mg/dL        High            8-26            [facility]  
   
                                        Comment on above:   Performed By: #### 1  
9775611, 5716987407, 3151442, 2188333,   
[id number] ####[facility] (DEFAULT)[address] [place] [address]   
   
                                                    Urea   
nitrogen/Creatinine   
[Mass ratio]    24.3 mg/mg      High            4.6-16.2        [facility]  
   
                                        Comment on above:   Performed By: #### 1  
5322636, 2131818778, 0002557, 3459358,   
[id number] ####[facility] (DEFAULT)[address] [place] [address]   
   
                                                    CBC w/ Auto Diffon    
   
                                                    Erythrocyte   
distribution width   
(RBC) [Ratio]   14.9 %          Normal          11.5-15.0       [facility]  
   
                                        Comment on above:   Performed By: #### 1  
8904497, 2552449584, 4072614, 2971023,   
[id number] ####[facility] (DEFAULT)[address] [place] [address]   
   
                                                    Hematocrit (Bld)   
[Volume fraction] 39.2 %          Normal          34.8-51.9       [facility]  
   
                                        Comment on above:   Performed By: #### 1  
5656969, 8404063770, 0271867, 4867466,   
[id number] ####[facility] (DEFAULT)[address] [place] [address]   
   
                                                    Hemoglobin (Bld)   
[Mass/Vol]      13.2 g/dL       Normal          11.8-17.7       [facility]  
   
                                        Comment on above:   Performed By: #### 1  
5150745, 5545050479, 6507873, 8424721,   
[id number] ####[facility] (DEFAULT)[address] [place] [address]   
   
                                Man Diff?       Auto            Invalid   
Interpretation   
Code                                                [facility]  
   
                                        Comment on above:   Performed By: #### 1  
0378910, 5472053128, 3203905, 0065595,   
[id number] ####[facility] (DEFAULT)[address] [place] [address]   
   
                                                    MCH (RBC) [Entitic   
mass]           30 pg           Normal          24-34           [facility]  
   
                                        Comment on above:   Performed By: #### 1  
8670603, 9305797166, 9965081, 8719474,   
[id number] ####[facility] (DEFAULT)[address] [place] [address]   
   
                                                    MCHC (RBC)   
[Mass/Vol]      34 g/dL         Normal          26-37           [facility]  
   
                                        Comment on above:   Performed By: #### 1  
7575264, 7801025960, 3448340, 2958737,   
[id number] ####[facility] (DEFAULT)[address] [place] [address]   
   
                                                    MCV (RBC) [Entitic   
vol]            88 fL           Normal                    [facility]  
   
                                        Comment on above:   Performed By: #### 1  
5255001, 8830111601, 5736976, 8598118,   
[id number] ####[facility] (DEFAULT)[address] [place] [address]   
   
                      Platelet   181 x10    Normal     138-427    [facility]  
   
                                        Comment on above:   Performed By: #### 1  
5146552, 6952567763, 5643122, 9419304,   
[id number] ####[facility] (DEFAULT)[address] [place] [address]   
   
                                                    Platelet mean volume   
(Bld) [Entitic vol] 8.1 fL          Normal          6.3-10.2        [facility]  
   
                                        Comment on above:   Performed By: #### 1  
6506936, 9882718100, 9033234, 7407041,   
[id number] ####[facility] (DEFAULT)[address] [place] [address]   
   
                      RBC        4.46 x10   Normal     3.70-5.30  [facility]  
   
                                        Comment on above:   Performed By: #### 1  
9990770, 2324742244, 9232899, 0098080,   
[id number] ####[facility] (DEFAULT)[address] [place] [address]   
   
                      WBC        6.0 x10    Normal     3.5-10.5   [facility]  
   
                                        Comment on above:   Performed By: #### 1  
4464435, 8970030800, 9328363, 4525144,   
[id number] ####[facility] (DEFAULT)[address] [place] [address]   
   
                                                    CRPon 2024   
   
                      CRP [Mass/Vol] mg/L       Normal     <=0.5      [facility]  
   
                                        Comment on above:   Performed By: #### 1  
2387123, 6105168147, 8899400, 7305490,   
[id number] ####[facility] (DEFAULT)[address] [place] [address]   
   
                                                    .Auto Diff 1on 2024   
   
                      Auto Mono % 10 %       Normal     1-12       [facility]  
   
                                        Comment on above:   Performed By: #### 1  
[id number], 16543585, 9864793 ####[facility] (DEFAULT)[address] [place] [address]   
   
                      Baso Abs#  0.0 x10    Normal     0.0-0.2    [facility]  
   
                                        Comment on above:   Performed By: #### 1  
[id number], 71157934, 3145453 ####[facility] (DEFAULT)[address] [place] [address]   
   
                                                    Basophils/100 WBC   
(Bld)           0.8 %           Normal          0.2-2.0         [facility]  
   
                                        Comment on above:   Performed By: #### 1  
[id number], 17155113, 6053229 ####[facility] (DEFAULT)[address] [place] [address]   
   
                      Eos Abs#   0.1 x10    Normal     0.0-0.4    [facility]  
   
                                        Comment on above:   Performed By: #### 1  
[id number], 16983521, 8592018 ####[facility] (DEFAULT)[address] [place] [address]   
   
                                                    Eosinophils/100 WBC   
(Bld)           2.4 %           Normal          0.9-4.0         [facility]  
   
                                        Comment on above:   Performed By: #### 1  
315041806, 21030698, 2603162 ####[facility] (DEFAULT)[address] [place] [address]   
   
                      Lymph Abs# 0.9 x10    Low        1.3-2.9    [facility]  
   
                                        Comment on above:   Performed By: #### 1  
085170694, 50802748, 9068065 ####[facility] (DEFAULT)[address] [place] [address]   
   
                                                    Lymphocytes/100 WBC   
(Bld)           17 %            Normal          14-48           [facility]  
   
                                        Comment on above:   Performed By: #### 1  
050748285, 40016916, 1649618 ####[facility] (DEFAULT)[address] [place] [address]   
   
                      Mono Abs#  0.5 x10    Normal     0.0-0.8    [facility]  
   
                                        Comment on above:   Performed By: #### 1  
[id number], 64438708, 2799187 ####[facility] (DEFAULT)[address] [place] [address]   
   
                      Neut Abs#  3.7 x10    Normal     1.5-9.2    [facility]  
   
                                        Comment on above:   Performed By: #### 1  
729487735, 44730544, 7246502 ####[facility] (DEFAULT)[address] [place] [address]   
   
                                                    Neutrophils/100 WBC   
(Bld)           70 %            Normal          44-88           [facility]  
   
                                        Comment on above:   Performed By: #### 1  
[id number], 42156445, 4329200 ####[facility] (DEFAULT)[address] [place] [address]   
   
                                                    BMP Standardon 2024   
   
                                eGFR Non AA     57 mL/min/1.73m2 Invalid   
Interpretation   
Code                                                [facility]  
   
                                        Comment on above:   Performed By: #### 1  
[id number], 31946999, 4185617 ####[facility] (DEFAULT)[address] [place] [address]   
   
                                eGFR AA         >60             Invalid   
Interpretation   
Code                                                [facility]  
   
                                        Comment on above:   Performed By: #### 1  
[id number], 45023321, 4326814 ####[facility] (DEFAULT)[address] [place] [address]   
   
                                                    Anion gap   
[Moles/Vol]     9.0 mmol/L      Normal          5.0-19.0        [facility]  
   
                                        Comment on above:   Performed By: #### 1  
[id number], 82477667, 8121942 ####[facility] (DEFAULT)[address] [place] [address]   
   
                      Calcium [Mass/Vol] 8.6 mg/dL  Low        8.9-10.3   [facility]  
   
                                        Comment on above:   Performed By: #### 1  
[id number], 99356869, 1587092 ####[facility] (DEFAULT)[address] [place] [address]   
   
                      Chloride [Moles/Vol] 110 mmol/L Normal     101-111    [facility]  
   
                                        Comment on above:   Performed By: #### 1  
212181931, 23518619, 7266791 ####[facility] (DEFAULT)[address] [place] [address]   
   
                      CO2 [Moles/Vol] 25 mmol/L  Normal     21-32      [facility]  
   
                                        Comment on above:   Performed By: #### 1  
[id number], 28626725, 2578517 ####[facility] (DEFAULT)[address] [place] [address]   
   
                                                    Creatinine   
[Mass/Vol]      1.20 mg/dL      Normal          0.90-1.30       [facility]  
   
                                        Comment on above:   Performed By: #### 1  
[id number], 49720260, 6535272 ####[facility] (DEFAULT)[address] [place] [address]   
   
                      Glucose [Mass/Vol] 116.0 mg/dL Normal     74.0-118.0 [facility]  
   
                                        Comment on above:   Performed By: #### 1  
123098228, 95806097, 0270642 ####[facility] (DEFAULT)[address] [place] [address]   
   
                                Osmolality      285 mOsm/L      Invalid   
Interpretation   
Code                                                [facility]  
   
                                        Comment on above:   Performed By: #### 1  
[id number], 71931154, 9275503 ####[facility] (DEFAULT)[address] [place] [address]   
   
                                                    Potassium   
[Moles/Vol]     4.0 mmol/L      Normal          3.6-5.1         [facility]  
   
                                        Comment on above:   Performed By: #### 1  
[id number], 78805834, 1330603 ####[facility] (DEFAULT)[address] [place] [address]   
   
                      Sodium [Moles/Vol] 140.0 mmol/L Normal     136.0-144.0 [facility]  
   
                                        Comment on above:   Performed By: #### 1  
[id number], 04139896, 8166705 ####[facility] (DEFAULT)[address] [place] [address]   
   
                                                    Urea nitrogen   
[Mass/Vol]      27 mg/dL        High            8-26            [facility]  
   
                                        Comment on above:   Performed By: #### 1  
[id number], 29449355, 7338498 ####[facility] (DEFAULT)[address] [place] [address]   
   
                                                    Urea   
nitrogen/Creatinine   
[Mass ratio]    22.5 mg/mg      High            4.6-16.2        [facility]  
   
                                        Comment on above:   Performed By: #### 1  
[id number], 50958599, 8955544 ####[facility] (DEFAULT)[address] [place] [address]   
   
                                                    CBC w/ Auto Diffon    
   
                                                    Erythrocyte   
distribution width   
(RBC) [Ratio]   14.8 %          Normal          11.5-15.0       [facility]  
   
                                        Comment on above:   Performed By: #### 1  
[id number], 58651043, 3944982 ####[facility] (DEFAULT)[address] [place] [address]   
   
                                                    Hematocrit (Bld)   
[Volume fraction] 39.1 %          Normal          34.8-51.9       [facility]  
   
                                        Comment on above:   Performed By: #### 1  
[id number], 31307360, 8540319 ####[facility] (DEFAULT)[address] [place] [address]   
   
                                                    Hemoglobin (Bld)   
[Mass/Vol]      12.9 g/dL       Normal          11.8-17.7       [facility]  
   
                                        Comment on above:   Performed By: #### 1  
[id number], 65243207, 0117309 ####[facility] (DEFAULT)[address] [place] [address]   
   
                                Man Diff?       Auto            Invalid   
Interpretation   
Code                                                [facility]  
   
                                        Comment on above:   Performed By: #### 1  
[id number], 87808586, 1844625 ####[facility] (DEFAULT)[address] [place] [address]   
   
                                                    MCH (RBC) [Entitic   
mass]           30 pg           Normal          24-34           [facility]  
   
                                        Comment on above:   Performed By: #### 1  
[id number], 26552202, 5113989 ####[facility] [employer] (DEFAULT)[address] [place] [address]   
   
                                                    MCHC (RBC)   
[Mass/Vol]      33 g/dL         Normal          26-37           [facility]  
   
                                        Comment on above:   Performed By: #### 1  
[id number], 74099871, 3227853 ####[facility] (DEFAULT)[address] [place] [address]   
   
                                                    MCV (RBC) [Entitic   
vol]            89 fL           Normal                    [facility]  
   
                                        Comment on above:   Performed By: #### 1  
[id number], 98449207, 0500876 ####[facility] (DEFAULT)[address] [place] [address]   
   
                      Platelet   177 x10    Normal     138-427    [facility]  
   
                                        Comment on above:   Performed By: #### 1  
[id number], 00863150, 9344855 ####[facility] (DEFAULT)[address] [place] [address]   
   
                                                    Platelet mean volume   
(Bld) [Entitic vol] 8.2 fL          Normal          6.3-10.2        [facility]  
   
                                        Comment on above:   Performed By: #### 1  
[id number], 97299121, 4269622 ####[facility] (DEFAULT)[address] [place] [address]   
   
                      RBC        4.38 x10   Normal     3.70-5.30  [facility]  
   
                                        Comment on above:   Performed By: #### 1  
[id number], 79594315, 6465160 ####[facility] (DEFAULT)[address] [place] [address]   
   
                      WBC        5.4 x10    Normal     3.5-10.5   [facility]  
   
                                        Comment on above:   Performed By: #### 1  
[id number], 21525161, 0464937 ####[facility] (DEFAULT)[address] [place] [address]   
   
                                                    .Auto Diff 1on 2024   
   
                      Auto Mono % 7 %        Normal     1-12       [facility]  
   
                                        Comment on above:   Performed By: #### 5  
[id number], 75814250, 0762205998,   
0847378737,   
7917658231, 4402672, 1147695381, 7936911436, 4645510, 9786809   
####[facility] (DEFAULT)[address] [place] [address]   
   
                      Baso Abs#  0.0 x10    Normal     0.0-0.2    [facility]  
   
                                        Comment on above:   Performed By: #### 5  
[id number], 07275694, 5406863189,   
8922422417,   
3824176893, 2475069, 8929305890, 1313858384, 7651019, 6564882   
####[facility] (DEFAULT)[address] [place] [address]   
[address]   
   
                                                    Basophils/100 WBC   
(Bld)           0.6 %           Normal          0.2-2.0         [facility]  
   
                                        Comment on above:   Performed By: #### 5  
881993633, 06329460, 5818898792,   
0671815644,   
1132295505, 6894237, 2865198044, 0858985120, 7405108, 1793909   
####[facility] (DEFAULT)[address] [place] [address]   
[address]   
   
                      Eos Abs#   0.1 x10    Normal     0.0-0.4    [facility]  
   
                                        Comment on above:   Performed By: #### 5  
391105347, 70070066, 5792867304,   
8847634824,   
7016161342, 0447085, 1774430020, 9750014186, 8902223, 1079149   
####[facility] (DEFAULT)[address] [place] [address]   
[address]   
   
                                                    Eosinophils/100 WBC   
(Bld)           2.9 %           Normal          0.9-4.0         [facility]  
   
                                        Comment on above:   Performed By: #### 5  
597694917, 10961269, 7803088067,   
0572735689,   
9271744974, 8568769, 5754619441, 4060021495, 7624448, 6233206   
####[facility] (DEFAULT)[address] [place] [address]   
[address]   
   
                      Lymph Abs# 0.8 x10    Low        1.3-2.9    [facility]  
   
                                        Comment on above:   Performed By: #### 5  
769470029, 36446130, 7501631738,   
2481865808,   
6045208317, 7741737, 0816318773, 3489536179, 3830793, 7897714   
####[facility] (DEFAULT)[address] [place] [address]   
[address]   
   
                                                    Lymphocytes/100 WBC   
(Bld)           17 %            Normal          14-48           [facility]  
   
                                        Comment on above:   Performed By: #### 5  
662052060, 06486638, 3920494279,   
4251081166,   
7583567124, 3636656, 9860469003, 9698535081, 4641798, 8845133   
####[facility] (DEFAULT)[address] [place] [address]   
   
                      Mono Abs#  0.3 x10    Normal     0.0-0.8    [facility]  
   
                                        Comment on above:   Performed By: #### 5  
588524630, 93411499, 3460257881,   
0162339007,   
8446830237, 3403641, 9491751837, 1424543708, 3284278, 8604602   
####[facility] (DEFAULT)[address] [place] [address]   
   
                      Neut Abs#  3.4 x10    Normal     1.5-9.2    [facility]  
   
                                        Comment on above:   Performed By: #### 5  
895185719, 44290765, 0325741367,   
2036879151,   
1723321648, 1031976, 1153889744, 1683736350, 5428199, 0632836   
####[facility] (DEFAULT)[address] [place] [address]   
   
                                                    Neutrophils/100 WBC   
(Bld)           73 %            Normal          44-88           [facility]  
   
                                        Comment on above:   Performed By: #### 5  
203843498, 99462711, 4758038641,   
9371407956,   
7772459539, 3024913, 9365232491, 7191113071, 1236858, 3987282   
####[facility] (DEFAULT)[address] [place] [address]   
   
                                                    Ammoniaon 2024   
   
                                                    Ammonia (P)   
[Moles/Vol]     15.0 umol/L     Normal          10.0-35.0       [facility]  
   
                                        Comment on above:   Performed By: #### 6  
68405150, 3345952, 4253393, 8895910,   
7368936   
####[facility] (DEFAULT)[address] [place] [address]   
   
                                                    BNP.on 2024   
   
                                                    Natriuretic peptide   
B (Bld) [Mass/Vol] 69.0 pg/mL      Normal          0.0-100.0       [facility]  
   
                                        Comment on above:   Result Comment: BNP   
results greater than 100 pg/mL are   
considered   
abnormal and suggestive of patients with CHF. Higher BNP   
concentrations measured in the first 72 hours after an acute   
coronary syndorme are associated with an increased risk of death,   
myocardial infarction, and CHF.   
   
                                                            Performed By: #### 5  
036477088, 13344406, 2664248449, 2714543887,   
6449775635, 2795675, 0648931434, 3346373866, 0919607, 7740322   
####[facility] (DEFAULT)[address] [place] [address]   
[address]   
   
                                                    CBC w/ Auto Diffon   
4   
   
                                                    Erythrocyte   
distribution width   
(RBC) [Ratio]   15.3 %          High            11.5-15.0       [facility]  
   
                                        Comment on above:   Performed By: #### 5  
933326900, 51712431, 8549339022,   
5153409102,   
8183185258, 3851761, 9396262767, 6292429709, 8132029, 3772238   
####[facility] (DEFAULT)[address] [place] [address]   
   
                                                    Hematocrit (Bld)   
[Volume fraction] 41.7 %          Normal          34.8-51.9       [facility]  
   
                                        Comment on above:   Performed By: #### 5  
753105112, 05027644, 6324751532,   
9534646718,   
9898198651, 3083994, 0817243018, 6943541591, 7331710, 3563385   
####[facility] (DEFAULT)[address] [place] [address]   
[address]   
   
                                                    Hemoglobin (Bld)   
[Mass/Vol]      13.8 g/dL       Normal          11.8-17.7       [facility]  
   
                                        Comment on above:   Performed By: #### 5  
579109073, 12239995, 9246397525,   
4538825186,   
3783331965, 6771870, 3536599515, 9463149352, 9353849, 9923522   
####[facility] (DEFAULT)[address] [place] [address]   
[address]   
   
                                Man Diff?       Auto            Invalid   
Interpretation   
Code                                                [facility]  
   
                                        Comment on above:   Performed By: #### 5  
653000826, 72022679, 4876264269,   
0849883359,   
6434306725, 4934927, 3841645981, 9723331608, 2195559, 9391675   
####[facility] (DEFAULT)[address] [place] [address]   
   
                                                    MCH (RBC) [Entitic   
mass]           30 pg           Normal          24-34           [facility]  
   
                                        Comment on above:   Performed By: #### 5  
915370589, 73112910, 9838363663,   
7908687126,   
1617259892, 9592524, 1123792773, 9527615257, 1521383, 8470446   
####[facility] (DEFAULT)[address] [place] [address]   
   
                                                    MCHC (RBC)   
[Mass/Vol]      33 g/dL         Normal          26-37           [facility]  
   
                                        Comment on above:   Performed By: #### 5  
608863970, 74889388, 5522172602,   
6345765679,   
7089495495, 7815115, 4882427128, 6383834466, 6125873, 2598907   
####[facility] (DEFAULT)[address] [place] [address]   
   
                                                    MCV (RBC) [Entitic   
vol]            90 fL           Normal                    [facility]  
   
                                        Comment on above:   Performed By: #### 5  
788012558, 28169161, 1561085607,   
5138174457,   
5593975774, 5811451, 2808006995, 7444966202, 8212509, 6648429   
####[facility] (DEFAULT)[address] [place] [address]   
   
                      Platelet   190 x10    Normal     138-427    [facility]  
   
                                        Comment on above:   Performed By: #### 5  
281527543, 96025995, 1299160339,   
4016876141,   
6434818998, 1488367, 4303376761, 3342619073, 5925959, 1990685   
####[facility] (DEFAULT)[address] [place] [address]   
   
                                                    Platelet mean volume   
(Bld) [Entitic vol] 8.0 fL          Normal          6.3-10.2        [facility]  
   
                                        Comment on above:   Performed By: #### 5  
625769528, 09951625, 9475044815,   
4520134128,   
7922055784, 8288797, 7623778125, 1994004199, 1708235, 8402614   
####[facility] (DEFAULT)[address] [place] [address]   
[address]   
   
                      RBC        4.63 x10   Normal     3.70-5.30  [facility]  
   
                                        Comment on above:   Performed By: #### 5  
387937997, 65379719, 0252596063,   
6942505495,   
8946279328, 9157472, 7622391491, 2181880410, 2100620, 8019786   
####[facility] (DEFAULT)[address] [place] [address]   
[address]   
   
                      WBC        4.7 x10    Normal     3.5-10.5   [facility]  
   
                                        Comment on above:   Performed By: #### 5  
057450154, 56559474, 1341644682,   
5040361164,   
5009521433, 3072487, 1528475269, 4471076498, 5529976, 6427326   
####[facility] (DEFAULT)[address] [place] [address]   
[address]   
   
                                                    [facility] Standardon 2024   
   
                                eGFR Non AA     59 mL/min/1.73m2 Invalid   
Interpretation   
Code                                                [facility]  
   
                                        Comment on above:   Performed By: #### 5  
457094874, 94656504, 9776546301,   
2218530437,   
1412203043, 8605357, 5184135015, 5882600558, 3528061, 4187525   
####[facility] (DEFAULT)[address] [place] [address]   
[address]   
   
                                eGFR AA         >60             Invalid   
Interpretation   
Code                                                [facility]  
   
                                        Comment on above:   Performed By: #### 5  
518382878, 71660311, 2439937259,   
4454350141,   
3443933955, 6441737, 7571504185, 9640596879, 0670986, 4717585   
####[facility] (DEFAULT)[address] [place] [address]   
[address]   
   
                      Albumin [Mass/Vol] 3.8 g/dL   Normal     3.5-5.0    [facility]  
   
                                        Comment on above:   Performed By: #### 5  
035049639, 58094660, 3824445322,   
0681955391,   
7684073412, 5384033, 8951156675, 1851755614, 4720895, 4987846   
####[facility] (DEFAULT)[address] [place] [address]   
[address]   
   
                                                    Albumin/Globulin   
[Mass ratio]    1.1 {ratio}     Low             1.4-2.6         [facility]  
   
                                        Comment on above:   Performed By: #### 5  
552724946, 09375584, 6495791306,   
4591705032,   
6714040370, 1148270, 8504824714, 7092548260, 1399110, 9743713   
####[facility] (DEFAULT)[address] [place] [address]   
   
                      Alk Phos   67 IU/L    Normal     32-91      [facility]  
   
                                        Comment on above:   Performed By: #### 5  
375620028, 50335422, 9829053600,   
3822800078,   
7196347176, 9770091, 0133043069, 8065224543, 1351431, 2009030   
####[facility] (DEFAULT)[address] [place] [address]   
[address]   
   
                                                    ALT [Catalytic   
activity/Vol]   15.0 U/L        Low             17.0-63.0       [facility]  
   
                                        Comment on above:   Performed By: #### 5  
732982354, 19291975, 1022652321,   
5411302287,   
3425594799, 8185689, 2608336666, 5888329221, 7142833, 5448370   
####[facility] (DEFAULT)[address] [place] [address]   
[address]   
   
                                                    Anion gap   
[Moles/Vol]     9.0 mmol/L      Normal          5.0-19.0        [facility]  
   
                                        Comment on above:   Performed By: #### 5  
252171102, 81379053, 3991652094,   
3922706822,   
4399062287, 1952026, 4191635766, 8944304092, 6861222, 2587989   
####[facility] (DEFAULT)[address] [place] [address]   
[address]   
   
                                                    AST [Catalytic   
activity/Vol]   16 U/L          Normal          15-41           [facility]  
   
                                        Comment on above:   Performed By: #### 5  
445506144, 71731133, 6640855483,   
3286855604,   
7277903495, 5768240, 4755827908, 8143830818, 6434842, 4332054   
####[facility] (DEFAULT)[address] [place] [address]   
[address]   
   
                      Bili Total 0.7 mg/dL  Normal     0.3-1.2    [facility]  
   
                                        Comment on above:   Performed By: #### 5  
209660287, 01174971, 0635331036,   
5351686819,   
7264204179, 0815364, 1852975706, 5403340296, 0483373, 3512186   
####[facility] (DEFAULT)[address] [place] [address]   
[address]   
   
                      Calcium [Mass/Vol] 8.9 mg/dL  Normal     8.9-10.3   [facility]  
   
                                        Comment on above:   Performed By: #### 5  
401467345, 95687790, 4265750873,   
7138349833,   
8735079396, 2320544, 8238225827, 9930572617, 6668445, 0454127   
####[facility] (DEFAULT)[address] [place] [address]   
[address]   
   
                      Chloride [Moles/Vol] 112 mmol/L High       101-111    [facility]  
   
                                        Comment on above:   Performed By: #### 5  
899857509, 48002606, 5794439801,   
8391377645,   
5768008350, , 3065184166, 3707693228, 6348739, 0708475   
####[facility] (DEFAULT)[address] [place] [address]   
[address]   
   
                      CO2 [Moles/Vol] 24 mmol/L  Normal     21-32      [facility]  
   
                                        Comment on above:   Performed By: #### 5  
443891835, 47901751, 2834341805,   
0995068397,   
8064730395, 6544027, 1432274017, 1863369800, 4520377, 6799703   
####[facility] (DEFAULT)[address] [place] [address]   
[address]   
   
                                                    Creatinine   
[Mass/Vol]      1.16 mg/dL      Normal          0.90-1.30       [facility]  
   
                                        Comment on above:   Performed By: #### 5  
114564329, 89115250, 6363330550,   
1642825570,   
9781047249, 7766568, 6012709727, 0053449548, 2990608, 5278383   
####[facility] (DEFAULT)[address] [place]   
[address]   
   
                                                    Globulin (S)   
[Mass/Vol]      3.3 g/dL        Normal          1.5-4.3         [facility]  
   
                                        Comment on above:   Performed By: #### 5  
177286431, 44051848, 4700064097,   
0301246202,   
0666611592, 8263820, 7145077027, 8908675473, 5614020, 9824717   
####[facility] (DEFAULT)[address] [place] [address]   
[address]   
   
                      Glucose [Mass/Vol] 137.0 mg/dL High       74.0-118.0 [facility]  
   
                                        Comment on above:   Performed By: #### 5  
010567450, 90927808, 9248247764,   
4115038919,   
2122411032, 2997685, 3007289103, 2722976975, 4158781, 5884214   
####[facility] (DEFAULT)[address] [place] [address]   
[address]   
   
                                Osmolality      289 mOsm/L      Invalid   
Interpretation   
Code                                                [facility]  
   
                                        Comment on above:   Performed By: #### 5  
347057406, 39250577, 0615025540,   
0727614413,   
5439483120, 6462761, 9704668848, 9681675500, 2343685, 4662058   
####[facility] (DEFAULT)[address] [place] [address]   
[address]   
   
                                                    Potassium   
[Moles/Vol]     4.0 mmol/L      Normal          3.6-5.1         [facility]  
   
                                        Comment on above:   Performed By: #### 5  
249789771, 38965366, 3607811500,   
3641505739,   
2374733892, 5195102, 4011813548, 7689659989, 1326040, 3392986   
####[facility] (DEFAULT)[address] [place] [address]   
[address]   
   
                      Protein [Mass/Vol] 7.1 g/dL   Normal     6.5-8.1    [facility]  
   
                                        Comment on above:   Performed By: #### 5  
697074626, 32549009, 2309130756,   
5530797471,   
0230302600, 9958273, 4753852866, 7305937369, 9937607, 4447987   
####[facility] (DEFAULT)[address] [place] [address]   
[address]   
   
                      Sodium [Moles/Vol] 141.0 mmol/L Normal     136.0-144.0 [facility]  
   
                                        Comment on above:   Performed By: #### 5  
872675342, 14898486, 7755310543,   
8316213052,   
3986694412, 3322072, 4700490895, 0928258067, 3761536, 3433878   
####[facility] (DEFAULT)[address] [place] [address]   
   
                                                    Urea nitrogen   
[Mass/Vol]      28 mg/dL        High            8-26            [facility]  
   
                                        Comment on above:   Performed By: #### 5  
885659724, 25658028, 3450642617,   
0955808588,   
6876105583, 4222605, 2368362166, 4889916645, 1075292, 7468763   
####[facility] (DEFAULT)[address] [place] [address]   
[address]   
   
                                                    Urea   
nitrogen/Creatinine   
[Mass ratio]    24.1 mg/mg      High            4.6-16.2        [facility]  
   
                                        Comment on above:   Performed By: #### 5  
647119156, 40125588, 1702997653,   
3938172207,   
3512338247, 9798733, 7020927043, 0610376229, 1732073, 2091643   
####[facility] (DEFAULT)[address] [place] [address]   
[address]   
   
                      Breakpoint Chem *******    Normal                [facility]  
   
                                        Comment on above:   Performed By: #### 5  
627281361, 97944271, 8101427779,   
6751561192,   
5998085658, 9561623, 1398296043, 6670427409, 9379672, 2829799   
####[facility] (DEFAULT)[address] [place] [address]   
[address]   
   
                                                    CT Head or Brain w/o Contras  
ton 2024   
   
                                                    CT Head or Brain w/o   
Contrast                        [facility]  
   
                                                    ED Clinical Summaryon 2024   
   
                      ED Clinical Summary            [facility]  
   
                                                    ED Note-Nursingon 2024  
   
   
                                        ED Note-Nursing     Patient is currently  
   
admitted to [facility] and   
pending labs will be   
reviewed by admitting   
provider.  
Electronically Signed   
by: Delaney Frazier on   
2024 12:18 EDT [facility]  
   
                                                    ED Patient Education Noteon   
2024   
   
                                                    ED Patient Education   
Note            Education Materials [facility]  
   
                                                    ED Patient Summaryon   
024   
   
                      ED Patient Summary            [facility]  
   
                                                    Extra Redon 2024   
   
                                Tube Collected  Yes             Invalid   
Interpretation   
Code                                                [facility]  
   
                                        Comment on above:   Performed By: #### 5  
[id number], [id number], [id number],   
[id number],   
[id number], 7486768, 9363805830, 5356510060, 5260645, 5951109   
####[facility] (DEFAULT)[address] [place] [address]   
[address]   
   
                                                    Folateon 2024   
   
                      Folic Acid Level 8.87 ng/mL Normal     5.90-24.80 [facility]  
   
                                        Comment on above:   Performed By: #### 6  
[id number], 2887823, 1389670, 2742416,   
7657086   
####[facility] (DEFAULT)[address] [place] [address]   
[address]   
   
                                                    HgbA1c Standardon 2024  
   
   
                                .Hb             14.7            Invalid   
Interpretation   
Code                                                [facility]  
   
                                        Comment on above:   Performed By: #### 1  
[id number] ####[facility]   
(DEFAULT)[address] [place] [address]   
   
                                .Hgb A1c        0.59 g/dL       Invalid   
Interpretation   
Code                                                [facility]  
   
                                        Comment on above:   Performed By: #### 1  
[id number] ####[facility]   
(DEFAULT)[address] [place] [address]   
   
                                Glucose [Mass/Vol] 120 mg/dL       Invalid   
Interpretation   
Code                                                [facility]  
   
                                        Comment on above:   Performed By: #### 1  
[id number] ####[facility]   
(DEFAULT)[address] [place] [address]   
   
                                                    HbA1c (Bld) [Mass   
fraction]       5.8 %           Normal          4.6-6.2         [facility]  
   
                                        Comment on above:   Performed By: #### 1  
[id number] ####[facility]   
(DEFAULT)[address] [place] [address]   
   
                                                    CARLISLE Formon 2024   
   
                                        CARLISLE Form           [id number]  
42  
[id number]#1.0  
0OTGTIFF            Normal                                  [facility]  
   
                                                    Magnesiumon 2024   
   
                      Magnesium [Mass/Vol] 2.08 mg/dL Normal     1.80-2.50  [facility]  
   
                                        Comment on above:   Performed By: #### 5  
[id number], [id number], 3343756989,   
5593931904,   
9466438113, 6869816, 5811649845, 9200619488, 4221495, 7437616   
####[facility] (DEFAULT)[address] [place] [address]   
[address]   
   
                                                    Nutrition Noteon 2024   
   
                      Nutrition Note            Normal                [facility]  
   
                                                    PTon 2024   
   
                                                    INR Coag (PPP)   
[Relative time] 1.04 {INR}      Normal          0.91-1.11       [facility]  
   
                                        Comment on above:   Performed By: #### 5  
[id number], 54577739, 6807958923,   
7386514473,   
8616761846, 3747318, 7448936779, 9287563454, 7866343, 1701714   
####[facility] (DEFAULT)[address] [place] [address]   
[address]   
   
                      PT         10.8 second(s) Normal     9.7-11.8   [facility]  
   
                                        Comment on above:   Performed By: #### 5  
[id number], 18249483, 193514,   
4019032767,   
9300937787, 2249642, 2597976975, 8943502086, 7962254, 3063777   
####[facility] (DEFAULT)[address] [place] [address]   
[address]   
   
                                                    Pharmacy Noteon 2024   
   
                      Pharmacy Note            Normal                [facility]  
   
                                                    Progress Note - Nurseon    
   
                                                    Progress Note -   
Nurse                           Normal                          [facility]  
   
                                                    TSH w/ Reflex to FT4on    
   
                      TSH Qn     2.00 m[IU]/L Normal     0.45-5.33  [facility]  
   
                                        Comment on above:   Performed By: #### 6  
[id number], 2689095, 3680799, 3590874,   
[id number]   
####[facility] (DEFAULT)[address] [place] [address]   
[address]   
   
                                                    TnI HSon 2024   
   
                                                    Troponin I High   
Sensitivity     15.6 pg/mL      Normal          <=20.0          [facility]  
   
                                        Comment on above:   Performed By: #### 5  
[id number], 54252220, 5578969867,   
3960955477,   
0292490112, 2116738, 2564727882, 6433061397, 6118686, 9879742   
####[facility] (DEFAULT)[address] [place] [address]   
   
                                                    UA w Culture if Ind Standard  
on 2024   
   
                      Breakpoint UA ********   [facility]  
   
                                        Comment on above:   Performed By: #### 1  
[id number] ####[facility]   
(DEFAULT)[address] [place] [address]   
   
                      Color (U)  Yellow     Normal                [facility]  
   
                                        Comment on above:   Performed By: #### 1  
[id number] ####[facility]   
(DEFAULT)[address] [place] [address]   
   
                                Culture?        Not Indicated   Invalid   
Interpretation   
Code                                                [facility]  
   
                                        Comment on above:   Result Comment: Resu  
lt created by rule GL_MAGR_ADD_UA_CULT1   
   
                                                            Performed By: #### 1  
[id number] ####[facility] (DEFAULT)[address] [place] [address]   
   
                                                    Glucose (U)   
[Mass/Vol]      Negative        [facility]  
   
                                        Comment on above:   Performed By: #### 1  
[id number] ####[facility]   
(DEFAULT)[address] [place] [address]   
   
                      Ketones Ql (U) Negative   Normal                [facility]  
   
                                        Comment on above:   Performed By: #### 1  
[id number] ####[facility]   
(DEFAULT)[address] [place] [address]   
   
                                Micro?          Not Indicated   Invalid   
Interpretation   
Code                                                [facility]  
   
                                        Comment on above:   Result Comment: Resu  
lt created by rule GL_MAGR_ADD_UA_MICRO   
   
                                                            Performed By: #### 1  
[id number] ####[facility] (DEFAULT)[address] [place] [address]   
   
                      UA Bilirubin Negative   Normal                [facility]  
   
                                        Comment on above:   Performed By: #### 1  
[id number] ####[facility]   
(DEFAULT)[address] [place] [address]   
   
                      UA Blood   Negative   Normal     NEGATIVE   [facility]  
   
                                        Comment on above:   Performed By: #### 1  
[id number] ####[facility]   
(DEFAULT)[address] [place] [address]   
   
                      UA Clarity CLEAR      Normal     CLEAR      [facility]  
   
                                        Comment on above:   Performed By: #### 1  
[id number] ####[facility]   
(DEFAULT)[address] [place] [address]   
   
                      UA Leuk Est Negative   Normal     NEGATIVE   [facility]  
   
                                        Comment on above:   Performed By: #### 1  
[id number] ####[facility]   
(DEFAULT)[address] [place] [address]   
   
                      UA Nitrite Negative   Normal     NEGATIVE   [facility]  
   
                                        Comment on above:   Performed By: #### 1  
[id number] ####[facility]   
(DEFAULT)[address] [place] [address]   
   
                      UA pH      6.0        Normal     5-8        [facility]  
   
                                        Comment on above:   Performed By: #### 1  
[id number] ####[facility]   
(DEFAULT)[address] [place] [address]   
   
                      UA Protein Negative   Normal     NEGATIVE   [facility]  
   
                                        Comment on above:   Performed By: #### 1  
[id number] ####[facility]   
(DEFAULT)[address] [place] [address]   
   
                      UA Spec Grav >=1.030    Normal     1.001-1.035 [facility]  
   
                                        Comment on above:   Performed By: #### 1  
[id number] ####[facility]   
(DEFAULT)[address] [place] [address]   
   
                      UA Urobilinogen 0.2 mg/dL  Normal     0.2-1.0    [facility]  
   
                                        Comment on above:   Performed By: #### 1  
[id number] ####[facility]   
(DEFAULT)[address] [place] [address]   
   
                      Urine Source Clean Catch Normal                [facility]  
   
                                        Comment on above:   Performed By: #### 1  
[id number] ####[facility]   
(DEFAULT)[address] [place] [address]   
   
                                                    Vit B12 Lvlon 2024   
   
                      Vit B12    275        Normal     180-914    [facility]  
   
                                        Comment on above:   Performed By: #### 6  
[id number], 5599961, 0803839, 4829501,   
9132390   
####[facility] (DEFAULT)[address] [place] [address]   
[address]   
   
                                                    Vit D25 OHon 2024   
   
                      Vitamin D 25 OH 16 ng/mL   Low             [facility]  
   
                                        Comment on above:   Performed By: #### 6  
[id number], 4875799, 7526668, 6263493,   
8959184   
####[facility] (DEFAULT)[address] [place] [address]   
[address]   
   
                                                    XR Chest 1 View Frontalon    
   
                                                    XR Chest 1 View   
Frontal                         Normal                          [facility]  
   
                                                    Coding Summaryon 2024   
   
                      Coding Summary            Normal                [facility]  
   
                                                    C Bloodon 2024   
   
                                        C Blood             Right AC @ 10:30 am   
MW  
No growth at 5 Days Normal                                  [facility]  
   
                                        Comment on above:   Performed By: #### 6  
[id number] ####[facility] (DEFAULT)[address] [place] [address]   
   
                                                    ED Note - Physicianon 07-10-  
2024   
   
                      ED Note - Physician            [facility]  
   
                                                    C Urineon 2024   
   
                      C Urine               Normal                [facility]  
   
                                        Comment on above:   Performed By: #### 1  
[id number], [id number], 1075981 ####[facility] (DEFAULT)[address] [place] [address]   
   
                                                    Outside Recordson 2024  
   
   
                                        Outside Records     [id number]  
01  
[id number]#[id number]  
0OTGTIFF            Normal                                  [facility]  
   
                                                    .Auto Diff 12024   
   
                      Auto Mono % 8 %        Normal     -12       [facility]  
   
                                        Comment on above:   Performed By: #### 1  
[id number], 4200781117, 15377179, 5319942,   
9722448993, 4992081927 ####[facility] (DEFAULT)[address] [place] [address]   
   
                      Baso Abs#  0.0 x10    Normal     0.0-0.2    [facility]  
   
                                        Comment on above:   Performed By: #### 1  
[id number], 0048088669, 21047169, 6043821,   
1198339220, 5035049698 ####[facility] (DEFAULT)[address] [place] [address]   
   
                                                    Basophils/100 WBC   
(Bld)           0.4 %           Normal          0.2-2.0         [facility]  
   
                                        Comment on above:   Performed By: #### 1  
[id number], 5077428826, 69274540, 2263983,   
2470636702, 1774726936 ####[facility] (DEFAULT)[address] [place] [address]   
   
                      Eos Abs#   0.1 x10    Normal     0.0-0.4    [facility]  
   
                                        Comment on above:   Performed By: #### 1  
[id number], 7272640446, 21739969, 7092735,   
8425722441, 5319525542 ####[facility] (DEFAULT)[address] [place] [address]   
   
                                                    Eosinophils/100 WBC   
(Bld)           1.4 %           Normal          0.9-4.0         [facility]  
   
                                        Comment on above:   Performed By: #### 1  
[id number], 9831752853, 70656302, 4704214,   
4418975914, 6113869692 ####[facility] (DEFAULT)[address] [place] [address]   
   
                      Lymph Abs# 1.0 x10    Low        1.3-2.9    [facility]  
   
                                        Comment on above:   Performed By: #### 1  
[id number], 2277305451, 23880156, 7062781,   
4022626927, 1208772643 ####[facility] (DEFAULT)[address] [place] [address]   
   
                                                    Lymphocytes/100 WBC   
(Bld)           16 %            Normal          14-48           [facility]  
   
                                        Comment on above:   Performed By: #### 1  
[id number], 2902533962, 11258695, 7873944,   
4553262287, 4060729466 ####[facility] (DEFAULT)[address] [place] [address]   
   
                      Mono Abs#  0.5 x10    Normal     0.0-0.8    [facility]  
   
                                        Comment on above:   Performed By: #### 1  
[id number], 2972060451, 77475474, 6391130,   
8629831263, 5261015591 ####[facility] (DEFAULT)[address] [place] [address]   
   
                      Neut Abs#  4.9 x10    Normal     1.5-9.2    [facility]  
   
                                        Comment on above:   Performed By: #### 1  
[id number], 4834069046, 79411454, 0476383,   
9791544567, 7623042520 ####[facility] (DEFAULT)[address] [place] [address]   
   
                                                    Neutrophils/100 WBC   
(Bld)           74 %            Normal          44-88           [facility]  
   
                                        Comment on above:   Performed By: #### 1  
[id number], 9905306180, 65987926, 9675460,   
8032168586, 7893102539 ####[facility] (DEFAULT)[address] [place] [address]   
   
                                                    CBC w/ Auto Diffon   
4   
   
                                                    Erythrocyte   
distribution width   
(RBC) [Ratio]   14.8 %          Normal          11.5-15.0       [facility]  
   
                                        Comment on above:   Performed By: #### 1  
[id number], 7018606584, 98204380, 7895597,   
7299251869, 3987513001 ####[facility] (DEFAULT)[address] [place] [address]   
   
                                                    Hematocrit (Bld)   
[Volume fraction] 42.1 %          Normal          34.8-51.9       [facility]  
   
                                        Comment on above:   Performed By: #### 1  
[id number], 6398083968, 28873092, 0591764,   
3183484033, 2815842480 ####[facility] (DEFAULT)[address] [place] [address]   
   
                                                    Hemoglobin (Bld)   
[Mass/Vol]      13.8 g/dL       Normal          11.8-17.7       [facility]  
   
                                        Comment on above:   Performed By: #### 1  
[id number], 3724885742, 23020617, 1542026,   
8964988630, 9769498777 ####[facility] (DEFAULT)[address] [place] [address]   
   
                                Man Diff?       Auto            Invalid   
Interpretation   
Code                                                [facility]  
   
                                        Comment on above:   Performed By: #### 1  
[id number], 8074887480, 77711020, 6804234,   
9339726908, 5689466919 ####[facility] (DEFAULT)[address] [place] [address]   
   
                                                    MCH (RBC) [Entitic   
mass]           29 pg           Normal          24-34           [facility]  
   
                                        Comment on above:   Performed By: #### 1  
[id number], 5978621732, 28784147, 1352832,   
2485959433, 8245793315 ####[facility] (DEFAULT)[address] [place] [address]   
   
                                                    MCHC (RBC)   
[Mass/Vol]      33 g/dL         Normal          26-37           [facility]  
   
                                        Comment on above:   Performed By: #### 1  
[id number], 5820350224, 34222398, 5373274,   
6818658684, 7117298976 ####[facility] (DEFAULT)[address] [place] [address]   
   
                                                    MCV (RBC) [Entitic   
vol]            90 fL           Normal                    [facility]  
   
                                        Comment on above:   Performed By: #### 1  
[id number], 4602438844, 66728369, 5060414,   
3849382541, 9764332068 ####[facility] (DEFAULT)[address] [place] [address]   
   
                      Platelet   191 x10    Normal     138-427    [facility]  
   
                                        Comment on above:   Performed By: #### 1  
[id number], 4290370560, 11836299, 6904566,   
4128591846, 8429764404 ####[facility] (DEFAULT)[address] [place] [address]   
   
                                                    Platelet mean volume   
(Bld) [Entitic vol] 8.1 fL          Normal          6.3-10.2        [facility]  
   
                                        Comment on above:   Performed By: #### 1  
[id number], 5946827059, 39190256, 0946615,   
2713967281, 6357229178 ####[facility] (DEFAULT)[address] [place] [address]   
   
                      RBC        4.68 x10   Normal     3.70-5.30  [facility]  
   
                                        Comment on above:   Performed By: #### 1  
[id number], 1607212943, 74686570, 5475915,   
9980860217, 2651609616 ####[facility] (DEFAULT)[address] [place] [address]   
   
                      WBC        6.6 x10    Normal     3.5-10.5   [facility]  
   
                                        Comment on above:   Performed By: #### 1  
[id number], 6730862568, 12965559, 7906748,   
8959727957, 0211067931 ####[facility] (DEFAULT)[address] [place] [address] Standardon 2024   
   
                                eGFR Non AA     57 mL/min/1.73m2 Invalid   
Interpretation   
Code                                                [facility]  
   
                                        Comment on above:   Performed By: #### 1  
[id number], 1539483381, 67709592, 6199821,   
8326137096, 0951698145 ####[facility] (DEFAULT)[address] [place] [address]   
   
                                eGFR AA         >60             Invalid   
Interpretation   
Code                                                [facility]  
   
                                        Comment on above:   Performed By: #### 1  
[id number], 2064289598, 22324199, 6381291,   
1566435498, 7649345528 ####[facility] (DEFAULT)[address] [place] [address]   
   
                      Albumin [Mass/Vol] 3.9 g/dL   Normal     3.5-5.0    [facility]  
   
                                        Comment on above:   Performed By: #### 1  
[id number], 7811515273, 14287867, 8053550,   
1530687330, 7162318748 ####[facility] (DEFAULT)[address] [place] [address]   
   
                                                    Albumin/Globulin   
[Mass ratio]    1.1 {ratio}     Low             1.4-2.6         [facility]  
   
                                        Comment on above:   Performed By: #### 1  
[id number], 9445876169, 37580478, 4063406,   
2533790460, 2696760134 ####[facility] (DEFAULT)[address] [place] [address]   
   
                      Alk Phos   75 IU/L    Normal     32-91      [facility]  
   
                                        Comment on above:   Performed By: #### 1  
[id number], [id number], 35655286, 0863022,   
[id number], [id number] ####[facility] (DEFAULT)[address] [place] [address]   
   
                                                    ALT [Catalytic   
activity/Vol]   18.0 U/L        Normal          17.0-63.0       [facility]  
   
                                        Comment on above:   Performed By: #### 1  
[id number], [id number], 36201632, 2991448,   
[id number], [id number] ####[facility] (DEFAULT)[address] [place] [address]   
   
                                                    Anion gap   
[Moles/Vol]     9.4 mmol/L      Normal          5.0-19.0        [facility]  
   
                                        Comment on above:   Performed By: #### 1  
[id number], [id number], 39790154, 0114533,   
[id number], [id number] ####[facility] (DEFAULT)[address] [place] [address]   
   
                                                    AST [Catalytic   
activity/Vol]   24 U/L          Normal          15-41           [facility]  
   
                                        Comment on above:   Performed By: #### 1  
[id number], [id number], 49687916, 3922193,   
2390697534, [id number] ####[facility] (DEFAULT)[address] [place] [address]   
   
                      Bili Total 0.7 mg/dL  Normal     0.3-1.2    [facility]  
   
                                        Comment on above:   Performed By: #### 1  
[id number], 9796758101, 77570602, 0636276,   
8644113856, [id number] ####[facility] (DEFAULT)[address] [place] [address]   
   
                      Calcium [Mass/Vol] 8.8 mg/dL  Low        8.9-10.3   [facility]  
   
                                        Comment on above:   Performed By: #### 1  
[id number], [id number], 87570798, 3296647,   
4888355301, [id number] ####[facility] (DEFAULT)[address] [place] [address]   
   
                      Chloride [Moles/Vol] 110 mmol/L Normal     101-111    [facility]  
   
                                        Comment on above:   Performed By: #### 1  
[id number], 4363933269, 35185641, 2165225,   
2835103730, 0479056801 ####[facility] (DEFAULT)[address] [place] [address]   
   
                      CO2 [Moles/Vol] 23 mmol/L  Normal     21-32      [facility]  
   
                                        Comment on above:   Performed By: #### 1  
[id number], 1665609717, 44648988, 7282040,   
2500630228, [id number] ####[facility] (DEFAULT)[address] [place] [address]   
   
                                                    Creatinine   
[Mass/Vol]      1.20 mg/dL      Normal          0.90-1.30       [facility]  
   
                                        Comment on above:   Performed By: #### 1  
[id number], 4163328411, 38443577, 1276978,   
7554797536, [id number] ####[facility] (DEFAULT)[address] [place] [address]   
   
                                                    Globulin (S)   
[Mass/Vol]      3.3 g/dL        Normal          1.5-4.3         [facility]  
   
                                        Comment on above:   Performed By: #### 1  
[id number], 7602734163, 85980165, 6966484,   
1472410969, 6532163675 ####[facility] (DEFAULT)[address] [place] [address]   
   
                      Glucose [Mass/Vol] 114.0 mg/dL Normal     74.0-118.0 [facility]  
   
                                        Comment on above:   Performed By: #### 1  
[id number], 9804092740, 81171743, 3786107,   
0918956323, [id number] ####[facility] (DEFAULT)[address] [place] [address]   
   
                                Osmolality      282 mOsm/L      Invalid   
Interpretation   
Code                                                [facility]  
   
                                        Comment on above:   Performed By: #### 1  
[id number], 1296999433, 70648678, 8860819,   
7749183002, 1513809323 ####[facility] (DEFAULT)[address] [place] [address]   
   
                                                    Potassium   
[Moles/Vol]     4.4 mmol/L      Normal          3.6-5.1         [facility]  
   
                                        Comment on above:   Performed By: #### 1  
[id number], 3983245393, 14826371, 9547001,   
5921131960, [id number] ####[facility] (DEFAULT)[address] [place] [address]   
   
                      Protein [Mass/Vol] 7.2 g/dL   Normal     6.5-8.1    [facility]  
   
                                        Comment on above:   Performed By: #### 1  
[id number], 7549445313, 76061566, 6121289,   
8674755186, 7371483374 ####[facility] (DEFAULT)[address] [place] [address]   
   
                      Sodium [Moles/Vol] 138.0 mmol/L Normal     136.0-144.0 [facility]  
   
                                        Comment on above:   Performed By: #### 1  
[id number], 8755468139, 20909858, 1691303,   
0830699154, 7403240958 ####[facility] (DEFAULT)[address] [place] [address]   
   
                                                    Urea nitrogen   
[Mass/Vol]      29 mg/dL        High            8-26            [facility]  
   
                                        Comment on above:   Performed By: #### 1  
[id number], 1351983837, 06070592, 4954514,   
8833899943, 1925242241 ####[facility] (DEFAULT)[address] [place] [address]   
   
                                                    Urea   
nitrogen/Creatinine   
[Mass ratio]    24.1 mg/mg      High            4.6-16.2        [facility]  
   
                                        Comment on above:   Performed By: #### 1  
[id number], 4903309865, 04847419, 8476022,   
2382052060, 0270993558 ####[facility] (DEFAULT)[address] [place] [address]   
   
                                                    ED Clinical Summaryon 2024   
   
                      ED Clinical Summary            Normal                [facility]  
   
                                                    ED Note - Physicianon 2024   
   
                      ED Note - Physician            [facility]  
   
                                                    ED Patient Education Noteon   
2024   
   
                                                    ED Patient Education   
Note                            Normal                          [facility]  
   
                                                    ED Patient Summaryon   
024   
   
                      ED Patient Summary            Normal                [facility]  
   
                                                    Extra Blueon 2024   
   
                                Tube Collected  Yes             Invalid   
Interpretation   
Code                                                [facility]  
   
                                        Comment on above:   Performed By: #### 1  
[id number], [id number], 10739789, 3064376,   
2060408619, [id number] ####[facility] (DEFAULT)[address] [place] [address]   
   
                                                    Lactic Acidon 2024   
   
                      Lactic Acid 14.1 mg/dL Normal     4.5-19.8   [facility]  
   
                                        Comment on above:   Performed By: #### 2  
[id number] ####[facility] (DEFAULT)[address] [place] [address]   
   
                                                    Progress Note - Nurseon    
   
                                                    Progress Note -   
Nurse                           Normal                          [facility]  
   
                                                    SARS-CoV-2 (COVID-19) PCRon   
2024   
   
                                                    Employed in   
healthcare?               Unknown                   Invalid   
Interpretation   
Code                                                [facility]  
   
                                        Comment on above:   Performed By: #### 6  
[id number] ####[facility]   
(DEFAULT)[address] [place] [address]   
   
                                Group care resident? Unknown         Invalid   
Interpretation   
Code                                                [facility]  
   
                                        Comment on above:   Performed By: #### 6  
[id number] ####[facility]   
(DEFAULT)[address] [place] [address]   
   
                                In ICU?         Unknown         Invalid   
Interpretation   
Code                                                [facility]  
   
                                        Comment on above:   Performed By: #### 6  
[id number] ####[facility]   
(DEFAULT)[address] [place] [address]   
   
                      Internal Control Pass       Normal                [facility]  
   
                                        Comment on above:   Performed By: #### 6  
[id number] ####[facility]   
(DEFAULT)[address] [place] [address]   
   
                                Pregnancy status? Unknown         Invalid   
Interpretation   
Code                                                [facility]  
   
                                        Comment on above:   Performed By: #### 6  
[id number] ####[facility]   
(DEFAULT)[address] [place] [address]   
   
                                                    SARS-CoV-2   
(COVID-19) RNA   
MIRIAM+probe Ql (Unsp   
spec)           Not detected    Normal          Not Detected    [facility]  
   
                                        Comment on above:   Result Comment: Perf  
ormed by PCR methodology.   
   
                                                            Performed By: #### 6  
[id number] ####[facility] (DEFAULT)[address] [place] [address]   
   
                                                    SARS-CoV-2   
(COVID-19) RNA   
MIRIAM+probe Ql (Unsp   
spec)                     Unknown                   Invalid   
Interpretation   
Code                                                [facility]  
   
                                        Comment on above:   Performed By: #### 6  
[id number] ####[facility]   
(DEFAULT)[address] [place] [address]   
   
                                                    Symptomatic as   
defined by CDC?           Unknown                   Invalid   
Interpretation   
Code                                                [facility]  
   
                                        Comment on above:   Performed By: #### 6  
[id number] ####[facility]   
(DEFAULT)[address] [place] [address]   
   
                                                    UA Rtdxt7ki 2024   
   
                      UA Bacteria 3+         Normal                [facility]  
   
                                        Comment on above:   Order Comment: Urina  
lysis Microscopic order added on by   
OOTU   
Expert Rules system.   
   
                                                            Performed By: #### 1  
[id number], 69988273, 9373003 ####[facility] (DEFAULT)[address] [place] [address]   
   
                      UA RBC     15-20      Normal                [facility]  
   
                                        Comment on above:   Order Comment: Urina  
lysis Microscopic order added on by   
OOTU   
Expert Rules system.   
   
                                                            Performed By: #### 1  
[id number], 21220402, 5564034 ####[facility] (DEFAULT)[address] [place] [address]   
   
                      UA Squam Epi Rare       [facility]  
   
                                        Comment on above:   Order Comment: Urina  
lysis Microscopic order added on by   
OOTU   
Expert Rules system.   
   
                                                            Performed By: #### 1  
[id number], 16786959, 4714264 ####[facility] (DEFAULT)[address] [place] [address]   
   
                      UA WBC     60-70      [facility]  
   
                                        Comment on above:   Order Comment: Urina  
lysis Microscopic order added on by   
Discern   
Expert Rules system.   
   
                                                            Performed By: #### 1  
[id number], 04828887, 8489525 ####[facility] (DEFAULT)[address] [place] [address]   
   
                                                    UA w Culture if Ind Standard  
on 2024   
   
                      Breakpoint UA ********   [facility]  
   
                                        Comment on above:   Performed By: #### 1  
[id number], 97240297, 0631794 ####[facility] (DEFAULT)[address] [place] [address]   
   
                      Color (U)  Yellow     [facility]  
   
                                        Comment on above:   Performed By: #### 1  
[id number], 77272770, 0376479 ####[facility] (DEFAULT)[address] [place] [address]   
   
                      Culture?   Yes        [facility]  
   
                                        Comment on above:   Result Comment: Resu  
lt created by rule GL_MAGR_ADD_UA_CULT   
Result   
created by rule GL_MAGR_ADD_UA_CULT1   
   
                                                            Performed By: #### 1  
[id number], 12388630, 9500832 ####[facility] (DEFAULT)[address] [place] [address]   
   
                                                    Glucose (U)   
[Mass/Vol]      Negative        [facility]  
   
                                        Comment on above:   Performed By: #### 1  
[id number], 40039590, 0824072 ####[facility] (DEFAULT)[address] [place] [address]   
   
                      Ketones Ql (U) Negative   [facility]  
   
                                        Comment on above:   Performed By: #### 1  
[id number], 03231460, 8271873 ####[facility] (DEFAULT)[address] [place] [address]   
   
                                Micro?          Indicated       Invalid   
Interpretation   
Code                                                [facility]  
   
                                        Comment on above:   Result Comment: Resu  
lt created by rule GL_MAGR_ADD_UA_MICRO   
   
                                                            Performed By: #### 1  
[id number], 75646081, 7678730 ####[facility] (DEFAULT)[address] [place] [address]   
   
                      UA Bilirubin Negative   Normal                [facility]  
   
                                        Comment on above:   Performed By: #### 1  
[id number], 09416613, 5040062 ####[facility] (DEFAULT)[address] [place] [address]   
   
                      UA Blood   MODERATE   Abnormal   NEGATIVE   [facility]  
   
                                        Comment on above:   Performed By: #### 1  
[id number], 79984547, 9099705 ####[facility] (DEFAULT)[address] [place] [address]   
   
                      UA Clarity SL CLOUDY  Abnormal   CLEAR      [facility]  
   
                                        Comment on above:   Performed By: #### 1  
[id number], 13161387, 0994114 ####[facility] (DEFAULT)[address] [place] [address]   
   
                      UA Leuk Est MODERATE   Abnormal   NEGATIVE   [facility]  
   
                                        Comment on above:   Performed By: #### 1  
[id number], 50585946, 5858167 ####[facility] (DEFAULT)[address] [place] [address]   
   
                      UA Nitrite Positive   Abnormal   NEGATIVE   [facility]  
   
                                        Comment on above:   Performed By: #### 1  
[id number], 57165210, 9201924 ####[facility] (DEFAULT)[address] [place] [address]   
   
                      UA pH      6.0        Normal     5-8        [facility]  
   
                                        Comment on above:   Performed By: #### 1  
[id number], 22400771, 3233069 ####[facility] (DEFAULT)[address] [place] [address]   
   
                      UA Protein TRACE      Abnormal   NEGATIVE   [facility]  
   
                                        Comment on above:   Performed By: #### 1  
[id number], 73118299, 8188447 ####[facility] (DEFAULT)[address] [place] [address]   
   
                      UA Spec Grav >=1.030    Normal     1.001-1.035 [facility]  
   
                                        Comment on above:   Performed By: #### 1  
[id number], 48541016, 1000719 ####[facility] (DEFAULT)[address] [place] [address]   
   
                      UA Urobilinogen 0.2 mg/dL  Normal     0.2-1.0    [facility]  
   
                                        Comment on above:   Performed By: #### 1  
[id number], 96487044, 8795900 ####[facility] (DEFAULT)[address] [place] [address]   
   
                      Urine Source Clean Catch [facility]  
   
                                        Comment on above:   Performed By: #### 1  
[id number], 05493748, 5319444 ####[facility] (DEFAULT)[address] [place] [address]   
   
                                                    XR Chest 2 Viewson    
   
                      XR Chest 2 Views            Normal                [facility]  
   
                                                    Outside Recordson 2024  
   
   
                                        Outside Records     [id number]  
[id number]#1.  
00OTGTIFF           [facility]  
   
                                        Outside Records     [id number]  
[id number]#1.  
00OTGTIFF           [facility]  
   
                                                    Outside Recordson 2024  
   
   
                                        Outside Records     [id number]  
21  
[id number]#1.0  
0OTGTIFF            [facility]  
   
                                                    Coding Summaryon 2024   
   
                      Coding Summary            Normal                [facility]  
   
                                                    Outside Recordson 2024  
   
   
                                        Outside Records     [id number]  
30  
[id number]#1.0  
0OTGTIFF            [facility]  
   
                                                    C Urineon 2024   
   
                      C Urine               Normal                [facility]  
   
                                        Comment on above:   Performed By: #### 6  
[id number] ####[facility] (DEFAULT)[address] [place], [place] [address]   
   
                                                    Provider Orderson 2024  
   
   
                                        Provider Orders     [id number]  
04  
[id number]#1.  
00OTGTIFF           [facility]  
   
                                                    Outside Recordson 2024  
   
   
                                        Outside Records     [id number]  
40  
[id number]#1.0  
0OTGTIFF            [facility]  
   
                                                    Office/Clinic Noteon   
024   
   
                      Office/Clinic Note            [facility]  
   
                                                    Outside Recordson 2024  
   
   
                                        Outside Records     [id number]  
41  
[id number]#1.00  
OTGTIFF             [facility]  
   
                                        Outside Records     [id number]  
41  
[id number]#1.00  
OTGTIFF             [facility]  
   
                                                    Outside Recordson 04-  
   
   
                                        Outside Records     [id number]  
31  
[id number]#1.0  
0OTGTIFF            [facility]  
   
                                                    Office/Clinic Noteon   
024   
   
                      Office/Clinic Note            [facility]  
   
                                                    Coding Summaryon 2024   
   
                      Coding Summary            [facility]  
   
                                                    C Bloodon 03-   
   
                                        C Blood             2nd site 15 minutes   
after first Blood   
Culture is drawn  
No growth at 5 Days [facility]  
   
                                        Comment on above:   Performed By: #### 6  
[id number] ####[facility] (DEFAULT)[address] [place] [address]   
   
                      C Blood    No growth at 5 Days [facility]  
   
                                        Comment on above:   Performed By: #### 6  
[id number] ####[facility] (DEFAULT)[address] [place] [address]   
   
                                                    Coding Summaryon 2024   
   
                      Coding Summary            [facility]  
   
                                                    C Urineon 2024   
   
                                        C Urine             Urine Culture ordere  
d   
as a result of   
parameters set on   
specific urine dip and   
urine microsopic   
results.  
No growth at 2 days. [facility]  
   
                                        Comment on above:   Performed By: #### 6  
[id number], [id number], [id number] ####[facility] [facility] (DEFAULT)[address] [place] [address]   
   
                                                    Consent Formson 2024   
   
                                        Consent Forms       [id number]  
31  
[id number]#1.00O  
TGTIFF              [facility]  
   
                                        Consent Forms       [id number]  
03  
[id number]#1.00  
[facility]  
   
                                                    Outside Recordson 2024  
   
   
                                        Outside Records     [id number]  
03  
[id number]#1.00  
[facility]  
   
                                        Outside Records     [id number]  
03  
[id number]#1.00  
[facility]  
   
                                                    Provider Orderson 2024  
   
   
                                        Provider Orders     [id number]  
  
[id number]#1.00  
[facility]  
   
                                                    Transfer Noteon 2024   
   
                                        Transfer Note       [id number]  
31  
[id number]#1.00O  
[facility]  
   
                                                    .Auto Diff 1on 2024   
   
                      Auto Mono % 10 %       Normal            [facility]  
   
                                        Comment on above:   Performed By: #### 1  
6042596, 4016008, 2026890, 6536948,   
2707135,   
[id number] ####[facility] (DEFAULT)[address] [place] [address]   
   
                      Baso Abs#  0.0 x10    Normal     0.0-0.2    [facility]  
   
                                        Comment on above:   Performed By: #### 1  
3935185, 4923107, 2552931, 7235611,   
9861612,   
[id number] ####[facility] (DEFAULT)[address] [place] [address]   
   
                                                    Basophils/100 WBC   
(Bld)           0.2 %           Normal          0.2-2.0         [facility]  
   
                                        Comment on above:   Performed By: #### 1  
4276715, 7999714, 6705673, 2167628,   
2984080,   
[id number] ####[facility] (DEFAULT)[address] [place] [address]   
   
                      Eos Abs#   0.1 x10    Normal     0.0-0.4    [facility]  
   
                                        Comment on above:   Performed By: #### 1  
4578657, 8290460, 5887123, 4304005,   
0324040,   
[id number] ####[facility] (DEFAULT)[address] [place] [address]   
   
                                                    Eosinophils/100 WBC   
(Bld)           1.3 %           Normal          0.9-4.0         [facility]  
   
                                        Comment on above:   Performed By: #### 1  
3965407, 5242788, 6159189, 9113749,   
0744657,   
[id number] ####[facility] (DEFAULT)[address] [place] [address]   
   
                      Lymph Abs# 0.7 x10    Low        1.3-2.9    [facility]  
   
                                        Comment on above:   Performed By: #### 1  
9854471, 0014849, 6713510, 5731839,   
6898275,   
[id number] ####[facility] (DEFAULT)[address] [place] [address]   
   
                                                    Lymphocytes/100 WBC   
(Bld)           13 %            Low             14-48           [facility]  
   
                                        Comment on above:   Performed By: #### 1  
9287597, 3857105, 4769302, 1240146,   
0633631,   
[id number] ####[facility] (DEFAULT)[address] [place] [address]   
   
                      Mono Abs#  0.5 x10    Normal     0.0-0.8    [facility]  
   
                                        Comment on above:   Performed By: #### 1  
9999151, 7124221, 4645688, 0286136,   
8730944,   
[id number] ####[facility] (DEFAULT)[address] [place] [address]   
   
                      Neut Abs#  3.8 x10    Normal     1.5-9.2    [facility]  
   
                                        Comment on above:   Performed By: #### 1  
2516631, 7161194, 8691980, 1681406,   
2099436,   
[id number] ####[facility] (DEFAULT)[address] [place] [address]   
   
                                                    Neutrophils/100 WBC   
(Bld)           75 %            Normal          44-88           [facility]  
   
                                        Comment on above:   Performed By: #### 1  
0041341, 8544635, 3460449, 8895779,   
2079087,   
[id number] ####[facility] (DEFAULT)[address] [place] [address]   
   
                                                    CBC w/ Auto Diffon   
4   
   
                                                    Erythrocyte   
distribution width   
(RBC) [Ratio]   15.1 %          High            11.5-15.0       [facility]  
   
                                        Comment on above:   Performed By: #### 1  
1656587, 2983928, 3384088, 9872907,   
0133380,   
[id number] ####[facility] (DEFAULT)[address] [place] [address]   
   
                                                    Hematocrit (Bld)   
[Volume fraction] 38.1 %          Normal          34.8-51.9       [facility]  
   
                                        Comment on above:   Performed By: #### 1  
1197029, 5811904, 1082729, 2191626,   
6907119,   
[id number] ####[facility] (DEFAULT)[address] [place] [address]   
   
                                                    Hemoglobin (Bld)   
[Mass/Vol]      13.0 g/dL       Normal          11.8-17.7       [facility]  
   
                                        Comment on above:   Performed By: #### 1  
0354637, 1750046, 2003266, 6273639,   
9236424,   
[id number] ####[facility] (DEFAULT)[address] [place] [address]   
   
                                Man Diff?       Auto            Invalid   
Interpretation   
Code                                                [facility]  
   
                                        Comment on above:   Performed By: #### 1  
1551915, 0666726, 0603073, 4663090,   
7693325,   
[id number] ####[facility] (DEFAULT)[address] [place] [address]   
   
                                                    MCH (RBC) [Entitic   
mass]           30 pg           Normal          24-34           [facility]  
   
                                        Comment on above:   Performed By: #### 1  
2800596, 3458471, 5827977, 5807492,   
6936210,   
[id number] ####[facility] (DEFAULT)[address] [place] [address]   
   
                                                    MCHC (RBC)   
[Mass/Vol]      34 g/dL         Normal          26-37           [facility]  
   
                                        Comment on above:   Performed By: #### 1  
1036519, 3234311, 3378304, 2153860,   
6651002,   
[id number] ####[facility] (DEFAULT)[address] [place] [address]   
   
                                                    MCV (RBC) [Entitic   
vol]            88 fL           Normal                    [facility]  
   
                                        Comment on above:   Performed By: #### 1  
0988461, 8745386, 4768333, 9680051,   
1232556,   
[id number] ####[facility] (DEFAULT)[address] [place] [address]   
   
                      Platelet   164 x10    Normal     138-427    [facility]  
   
                                        Comment on above:   Performed By: #### 1  
2222716, 8756421, 7730658, 5774253,   
1061026,   
[id number] ####[facility] (DEFAULT)[address] [place] [address]   
   
                                                    Platelet mean volume   
(Bld) [Entitic vol] 8.4 fL          Normal          6.3-10.2        [facility]  
   
                                        Comment on above:   Performed By: #### 1  
5510192, 7369296, 4120604, 5386517,   
1921635,   
[id number] ####[facility] (DEFAULT)[address] [place] [address]   
   
                      RBC        4.34 x10   Normal     3.70-5.30  [facility]  
   
                                        Comment on above:   Performed By: #### 1  
0533613, 0471571, 0725086, 8360992,   
7279183,   
[id number] ####[facility] (DEFAULT)[address] [place] [address]   
   
                      WBC        5.1 x10    Normal     3.5-10.5   [facility]  
   
                                        Comment on above:   Performed By: #### 1  
4192676, 2176464, 7113128, 8257413,   
6068228,   
[id number] ####[facility] (DEFAULT)[address] [place] [address]   
   
                                                    CKon 2024   
   
                                                    CK [Catalytic   
activity/Vol]   364 U/L         Normal                    [facility]  
   
                                        Comment on above:   Performed By: #### 1  
9787701, 1195606, 5140166, 5756006,   
9393780,   
[id number] ####[facility] (DEFAULT)[address] [place] [address]   
   
                                                    CMP Standardon 2024   
   
                                eGFR Non AA     59 mL/min/1.73m2 Invalid   
Interpretation   
Code                                                [facility]  
   
                                        Comment on above:   Performed By: #### 1  
1798033, 7243446, 4185427, 9245723,   
5794346,   
[id number] ####[facility] (DEFAULT)[address] [place] [address]   
   
                                eGFR AA         >60             Invalid   
Interpretation   
Code                                                [facility]  
   
                                        Comment on above:   Performed By: #### 1  
3801547, 3006996, 4855032, 8327636,   
1372122,   
[id number] ####[facility] (DEFAULT)[address] [place] [address]   
   
                      Albumin [Mass/Vol] 3.1 g/dL   Low        3.5-5.0    [facility]  
   
                                        Comment on above:   Performed By: #### 1  
7125328, 3261452, 4330259, 8742233,   
7290705,   
[id number] ####[facility] (DEFAULT)[address] [place] [address]   
   
                      Alk Phos   54 IU/L    Normal     32-91      [facility]  
   
                                        Comment on above:   Performed By: #### 1  
3928116, 1460598, 3928052, 2768136,   
5190128,   
[id number] ####[facility] (DEFAULT)[address] [place] [address]   
   
                                                    ALT [Catalytic   
activity/Vol]   9.0 U/L         Low             17.0-63.0       [facility]  
   
                                        Comment on above:   Performed By: #### 1  
6767884, 5099496, 4620692, 6510095,   
2214447,   
[id number] ####[facility] (DEFAULT)[address] [place] [address]   
   
                                                    AST [Catalytic   
activity/Vol]   82 U/L          High            15-41           [facility]  
   
                                        Comment on above:   Performed By: #### 1  
9289495, 1614161, 2246372, 0733744,   
7062592,   
[id number] ####[facility] (DEFAULT)[address] [place] [address]   
   
                      Bili Total 1.0 mg/dL  Normal     0.3-1.2    [facility]  
   
                                        Comment on above:   Performed By: #### 1  
2958854, 4723417, 5305120, 9260668,   
6745296,   
[id number] ####[facility] (DEFAULT)[address] [place] [address]   
   
                      Calcium [Mass/Vol] 8.4 mg/dL  Low        8.9-10.3   [facility]  
   
                                        Comment on above:   Performed By: #### 1  
6205234, 8218669, 0524481, 7063018,   
9449393,   
[id number] ####[facility] (DEFAULT)[address] [place] [address]   
   
                      Chloride [Moles/Vol] 104 mmol/L Normal     101-111    [facility]  
   
                                        Comment on above:   Performed By: #### 1  
6338871, 2804268, 7004435, 2064118,   
7985490,   
[id number] ####[facility] (DEFAULT)[address] [place] [address]   
   
                      CO2 [Moles/Vol] 26 mmol/L  Normal     21-32      [facility]  
   
                                        Comment on above:   Performed By: #### 1  
1436063, 4093710, 5301630, 4715816,   
7056342,   
[id number] ####[facility] (DEFAULT)[address] [place] [address]   
   
                                                    Creatinine   
[Mass/Vol]      1.16 mg/dL      Normal          0.90-1.30       [facility]  
   
                                        Comment on above:   Performed By: #### 1  
4534317, 0162308, 0608062, 3037636,   
5040330,   
[id number] ####[facility] (DEFAULT)[address] [place] [address]   
   
                      Glucose [Mass/Vol] 124.0 mg/dL High       74.0-118.0 [facility]  
   
                                        Comment on above:   Performed By: #### 1  
0547415, 8853998, 4959489, 8753536,   
5431303,   
[id number] ####[facility] (DEFAULT)[address] [place] [address]   
   
                                                    Potassium   
[Moles/Vol]     3.8 mmol/L      Normal          3.6-5.1         [facility]  
   
                                        Comment on above:   Performed By: #### 1  
0883330, 3316688, 0180022, 7483302,   
0487890,   
[id number] ####[facility] (DEFAULT)[address] [place] [address]   
   
                      Protein [Mass/Vol] 6.0 g/dL   Low        6.5-8.1    [facility]  
   
                                        Comment on above:   Performed By: #### 1  
8796308, 6934801, 8168298, 8682092,   
7573290,   
[id number] ####[facility] (DEFAULT)[address] [place] [address]   
   
                      Sodium [Moles/Vol] 140.0 mmol/L Normal     136.0-144.0 [facility]  
   
                                        Comment on above:   Performed By: #### 1  
7715317, 9823954, 5791928, 8879263,   
5422912,   
[id number] ####[facility] (DEFAULT)[address] [place] [address]   
   
                                                    Urea nitrogen   
[Mass/Vol]      22 mg/dL        Normal          8-26            [facility]  
   
                                        Comment on above:   Performed By: #### 1  
2370907, 3486229, 0036472, 9218322,   
3279846,   
[id number] ####[facility] (DEFAULT)[address] [place] [address]   
   
                                                    Albumin/Globulin   
[Mass ratio]    1.0 {ratio}     Low             1.4-2.6         [facility]  
   
                                        Comment on above:   Performed By: #### 1  
5208585, 8301070, 5300862, 3900962,   
0572569,   
[id number] ####[facility] (DEFAULT)[address] [place] [address]   
   
                                                    Anion gap   
[Moles/Vol]     13.8 mmol/L     Normal          5.0-19.0        [facility]  
   
                                        Comment on above:   Performed By: #### 1  
4044611, 2779218, 9789060, 9760303,   
3751950,   
[id number] ####[facility] (DEFAULT)[address] [place] [address]   
   
                                                    Globulin (S)   
[Mass/Vol]      2.9 g/dL        Normal          1.5-4.3         [facility]  
   
                                        Comment on above:   Performed By: #### 1  
0295853, 2622618, 6441891, 3733735,   
9688676,   
[id number] ####[facility] (DEFAULT)[address] [place] [address]   
   
                                Osmolality      284 mOsm/L      Invalid   
Interpretation   
Code                                                [facility]  
   
                                        Comment on above:   Performed By: #### 1  
2112413, 6111654, 7076181, 4891530,   
9096045,   
[id number] ####[facility] (DEFAULT)[address] [place] [address]   
   
                                                    Urea   
nitrogen/Creatinine   
[Mass ratio]    18.9 mg/mg      High            4.6-16.2        [facility]  
   
                                        Comment on above:   Performed By: #### 1  
7753366, 6273305, 7997160, 3739988,   
9959882,   
[id number] ####[facility] (DEFAULT)[address] [place] [address]   
   
                                                    CRPon 2024   
   
                      CRP        8.3 mg/dL  High       <=0.5      [facility]  
   
                                        Comment on above:   Performed By: #### 1  
5579051, 5614805, 9343126, 9517100,   
0302238,   
[id number] ####[facility] (DEFAULT)[address] [place] [address]   
   
                                                    Education Noteon 2024   
   
                      Education Note            Normal                [facility]  
   
                                                    Inpatient Clinical Summaryon  
 2024   
   
                                                    Inpatient Clinical   
Summary                         Normal                          [facility]  
   
                                                    Inpatient Patient Summaryon   
2024   
   
                                                    Inpatient Patient   
Summary                         Normal                          [facility]  
   
                                                    Myoglobinon 2024   
   
                      Myoglobin [Mass/Vol] 228.5 ng/mL High       17.4-105.7 [facility]  
   
                                        Comment on above:   Performed By: #### 1  
7911513, 0707109, 3791144, 4914508,   
5059469,   
[id number] ####[facility] (DEFAULT)[address] [place] [address]   
   
                                                    Pharmacy Noteon 2024   
   
                      Pharmacy Note            Normal                [facility]  
   
                                                    Progress Note - Nurseon    
   
                                                    Progress Note -   
Nurse                           [facility]  
   
                                                    Telemetry Stripson    
   
                                        Telemetry Strips    [id number]  
21  
[id number]#1.00O  
TGTIFF              [facility]  
   
                                                    .Auto Diff 1on 03-   
   
                      Auto Mono % 11 %       Normal            [facility]  
   
                                        Comment on above:   Performed By: #### 2  
492068, 9463431, 5012554, 9762952236,   
[id number], 00021297, 5336040 ####[facility] (DEFAULT)[address] [place] [address]   
   
                      Baso Abs#  0.0 x10    Normal     0.0-0.2    [facility]  
   
                                        Comment on above:   Performed By: #### 2  
544863, 0302032, 4622019, 3166073038,   
7067563422, 25544974, 5207550 ####[facility] (DEFAULT)[address] [place] [address]   
   
                                                    Basophils/100 WBC   
(Bld)           0.2 %           Normal          0.2-2.0         [facility]  
   
                                        Comment on above:   Performed By: #### 2  
701015, 0972400, 1961823, 7608751659,   
0027843477, 00578355, 4988463 ####[facility] (DEFAULT)[address] [place] [address]   
   
                      Eos Abs#   0.0 x10    Normal     0.0-0.4    [facility]  
   
                                        Comment on above:   Performed By: #### 2  
034485, 2262752, 2217558, 9112642851,   
1644089320, 80041758, 2430049 ####[facility] (DEFAULT)[address] [place] [address]   
   
                                                    Eosinophils/100 WBC   
(Bld)           0.7 %           Low             0.9-4.0         [facility]  
   
                                        Comment on above:   Performed By: #### 2  
718960, 6745492, 6265528, 5100910141,   
0771256430, 53679021, 6447214 ####[facility] (DEFAULT)[address] [place] [address]   
   
                      Lymph Abs# 0.5 x10    Low        1.3-2.9    [facility]  
   
                                        Comment on above:   Performed By: #### 2  
370003, 7913856, 0470822, 4381584930,   
1480876555, 81153725, 6047459 ####[facility] (DEFAULT)[address] [place] [address]   
   
                                                    Lymphocytes/100 WBC   
(Bld)           12 %            Low             14-48           [facility]  
   
                                        Comment on above:   Performed By: #### 2  
065716, 5309505, 3273288, 0794871295,   
[id number], 14346946, 0593412 ####[facility] (DEFAULT)[address] [place] [address]   
   
                      Mono Abs#  0.5 x10    Normal     0.0-0.8    [facility]  
   
                                        Comment on above:   Performed By: #### 2  
749867, 6713249, 3401508, 5656037811,   
9298954755, 69101937, 9006716 ####[facility] (DEFAULT)[address] [place] [address]   
   
                      Neut Abs#  3.4 x10    Normal     1.5-9.2    [facility]  
   
                                        Comment on above:   Performed By: #### 2  
963256, 8085470, 1521446, 5692306599,   
3189254327, 27815117, 7794009 ####[facility] (DEFAULT)[address] [place] [address]   
   
                                                    Neutrophils/100 WBC   
(Bld)           76 %            Normal          44-88           [facility]  
   
                                        Comment on above:   Performed By: #### 2  
873912, 5670920, 4705200, [id number],   
2005372282, 51864718, 2990081 ####[facility] (DEFAULT)[address] [place] [address]   
   
                                                    BMP Standardon 03-   
   
                                eGFR Non AA     60 mL/min/1.73m2 Invalid   
Interpretation   
Code                                                [facility]  
   
                                        Comment on above:   Performed By: #### 2  
388238, 6821878, 9572957, [id number],   
[id number], 68113809, 8090368 ####[facility] (DEFAULT)[address] [place] [address]   
   
                                eGFR AA         >60             Invalid   
Interpretation   
Code                                                [facility]  
   
                                        Comment on above:   Performed By: #### 2  
077411, 5432185, 0237286, [id number],   
[id number], 28662700, 5721081 ####[facility] (DEFAULT)[address] [place] [address]   
   
                                                    Anion gap   
[Moles/Vol]     11.7 mmol/L     Normal          5.0-19.0        [facility]  
   
                                        Comment on above:   Performed By: #### 2  
765541, 8955708, 8300785, 6761283736,   
9400391937, 54319914, 0267962 ####[facility] (DEFAULT)[address] [place] [address]   
   
                      Calcium [Mass/Vol] 8.2 mg/dL  Low        8.9-10.3   [facility]  
   
                                        Comment on above:   Performed By: #### 2  
753578, 9153706, 0832411, 4415250107,   
[id number], 38516353, 4856221 ####[facility] (DEFAULT)[address] [place] [address]   
   
                      Chloride [Moles/Vol] 107 mmol/L Normal     101-111    [facility]  
   
                                        Comment on above:   Performed By: #### 2  
803830, 4556110, 3837437, 9062534260,   
4191122529, 90917088, 3464564 ####[facility] (DEFAULT)[address] [place] [address]   
   
                      CO2 [Moles/Vol] 26 mmol/L  Normal     21-32      [facility]  
   
                                        Comment on above:   Performed By: #### 2  
027284, 2106400, 6246852, 0961936008,   
6800507366, 41936516, 5879325 ####[facility] (DEFAULT)[address] [place] [address]   
   
                                                    Creatinine   
[Mass/Vol]      1.15 mg/dL      Normal          0.90-1.30       [facility]  
   
                                        Comment on above:   Performed By: #### 2  
128855, 7405499, 2058465, 6616663253,   
8234047164, 00042672, 3800352 ####[facility] (DEFAULT)[address] [place] [address]   
   
                      Glucose [Mass/Vol] 112.0 mg/dL Normal     74.0-118.0 [facility]  
   
                                        Comment on above:   Performed By: #### 2  
302569, 0213286, 7819425, 4167672462,   
1418412783, 95614833, 9704272 ####[facility] (DEFAULT)[address] [place] [address]   
   
                                Osmolality      284 mOsm/L      Invalid   
Interpretation   
Code                                                [facility]  
   
                                        Comment on above:   Performed By: #### 2  
295401, 8845704, 3342544, 0050237307,   
2325298306, 82205457, 9745619 ####[facility] (DEFAULT)[address] [place] [address]   
   
                                                    Potassium   
[Moles/Vol]     3.7 mmol/L      Normal          3.6-5.1         [facility]  
   
                                        Comment on above:   Performed By: #### 2  
512348, 2202102, 8946981, 0622995698,   
2728674626, 63679899, 8548324 ####[facility] (DEFAULT)[address] [place] [address]   
   
                      Sodium [Moles/Vol] 141.0 mmol/L Normal     136.0-144.0 [facility]  
   
                                        Comment on above:   Performed By: #### 2  
718369, 4339926, 9601880, 5567238244,   
5340412410, 52097859, 4370003 ####[facility] (DEFAULT)[address] [place] [address]   
   
                                                    Urea nitrogen   
[Mass/Vol]      19 mg/dL        Normal          8-26            [facility]  
   
                                        Comment on above:   Performed By: #### 2  
440278, 7365821, 5276730, 9505401123,   
0373882599, 17616593, 7974874 ####[facility] (DEFAULT)[address] [place] [address]   
   
                                                    Urea   
nitrogen/Creatinine   
[Mass ratio]    16.5 mg/mg      High            4.6-16.2        [facility]  
   
                                        Comment on above:   Performed By: #### 2  
011363, 0799504, 8439436, 6239615130,   
0620075187, 79857286, 4078656 ####[facility] (DEFAULT)[address] [place] [address]   
   
                                                    CBC w/ Auto Diffon 03-  
4   
   
                                                    Erythrocyte   
distribution width   
(RBC) [Ratio]   15.0 %          Normal          11.5-15.0       [facility]  
   
                                        Comment on above:   Performed By: #### 2  
366432, 0717768, 2296997, 9023908207,   
8254234175, 34258655, 7683137 ####[facility] (DEFAULT)[address] [place] [address]   
   
                                                    Hematocrit (Bld)   
[Volume fraction] 37.4 %          Normal          34.8-51.9       [facility]  
   
                                        Comment on above:   Performed By: #### 2  
836354, 4927707, 7045380, 5543691494,   
9465211286, 13534808, 2493102 ####[facility] (DEFAULT)[address] [place] [address]   
   
                                                    Hemoglobin (Bld)   
[Mass/Vol]      12.5 g/dL       Normal          11.8-17.7       [facility]  
   
                                        Comment on above:   Performed By: #### 2  
796102, 1223930, 8486002, 1778423364,   
3395029126, 02716237, 9878467 ####[facility] (DEFAULT)[address] [place] [address]   
   
                                Man Diff?       Auto            Invalid   
Interpretation   
Code                                                [facility]  
   
                                        Comment on above:   Performed By: #### 2  
605444, 3119131, 1224796, 9207673459,   
7715035140, 18937422, 9167704 ####[facility] (DEFAULT)[address] [place] [address]   
   
                                                    MCH (RBC) [Entitic   
mass]           30 pg           Normal          24-34           [facility]  
   
                                        Comment on above:   Performed By: #### 2  
624887, 8435972, 7367516, 2433287279,   
1474018438, 01476567, 7480570 ####[facility] (DEFAULT)[address] [place] [address]   
   
                                                    MCHC (RBC)   
[Mass/Vol]      33 g/dL         Normal          26-37           [facility]  
   
                                        Comment on above:   Performed By: #### 2  
106306, 0022949, 8550753, 2799741102,   
8592301934, 64515937, 0445244 ####[facility] (DEFAULT)[address] [place] [address]   
   
                                                    MCV (RBC) [Entitic   
vol]            89 fL           Normal                    [facility]  
   
                                        Comment on above:   Performed By: #### 2  
693838, 0020694, 9033356, 9088426957,   
9267585956, 65179907, 5863959 ####[facility] (DEFAULT)[address] [place] [address]   
   
                      Platelet   134 x10    Low        138-427    [facility]  
   
                                        Comment on above:   Performed By: #### 2  
913074, 8637562, 2509656, 6641753942,   
4808994627, 86751676, 2418171 ####[facility] (DEFAULT)[address] [place] [address]   
   
                                                    Platelet mean volume   
(Bld) [Entitic vol] 8.4 fL          Normal          6.3-10.2        [facility]  
   
                                        Comment on above:   Performed By: #### 2  
830078, 3510998, 4495368, 1399036445,   
6815717277, 66183265, 6187027 ####[facility] (DEFAULT)[address] [place] [address]   
   
                      RBC        4.20 x10   Normal     3.70-5.30  [facility]  
   
                                        Comment on above:   Performed By: #### 2  
172426, 2379884, 8750848, 2714761966,   
5277380436, 96124681, 1823388 ####[facility] (DEFAULT)[address] [place] [address]   
   
                      WBC        4.5 x10    Normal     3.5-10.5   [facility]  
   
                                        Comment on above:   Performed By: #### 2  
798890, 5727857, 4697059, 7410657390,   
3187153333, 87122058, 8713948 ####[facility] (DEFAULT)[address] [place] [address]   
   
                                                    CKon 03-   
   
                                                    CK [Catalytic   
activity/Vol]   470 U/L         High                      [facility]  
   
                                        Comment on above:   Performed By: #### 2  
212826, 8197803, 5441329, 7099914315,   
9227100820, 11908419, 9681869 ####[facility] (DEFAULT)[address] [place] [address]   
   
                                                    CMP Standardon 03-   
   
                      Albumin [Mass/Vol] 2.9 g/dL   Low        3.5-5.0    [facility]  
   
                                        Comment on above:   Performed By: #### 2  
220528, 4270340, 2685610, 0997883552,   
3130189964, 24901398, 0286882 ####[facility] (DEFAULT)[address] [place] [address]   
   
                                                    Albumin/Globulin   
[Mass ratio]    1.1 {ratio}     Low             1.4-2.6         [facility]  
   
                                        Comment on above:   Performed By: #### 2  
507507, 4001075, 6976927, 9828476346,   
1891721055, 69620953, 2793120 ####[facility] (DEFAULT)[address] [place] [address]   
   
                      Alk Phos   50 IU/L    Normal     32-91      [facility]  
   
                                        Comment on above:   Performed By: #### 2  
543200, 8135610, 2504670, 2681796267,   
2724150250, 38008668, 1677343 ####[facility] (DEFAULT)[address] [place] [address]   
   
                                                    ALT [Catalytic   
activity/Vol]   7.0 U/L         Low             17.0-63.0       [facility]  
   
                                        Comment on above:   Performed By: #### 2  
534622, 9636685, 2152124, 2156586610,   
0477483184, 24258076, 8511728 ####[facility] (DEFAULT)[address] [place] [address]   
   
                                                    AST [Catalytic   
activity/Vol]   82 U/L          High            15-41           [facility]  
   
                                        Comment on above:   Performed By: #### 2  
246469, 7241466, 9383613, 2234916272,   
3315246277, 36135787, 3554422 ####[facility] (DEFAULT)[address] [place] [address]   
   
                      Bili Total 0.9 mg/dL  Normal     0.3-1.2    [facility]  
   
                                        Comment on above:   Performed By: #### 2  
007198, 3268582, 2311328, 4217415266,   
2871120248, 59956360, 9569717 ####[facility] (DEFAULT)[address] [place] [address]   
   
                                                    Globulin (S)   
[Mass/Vol]      2.6 g/dL        Normal          1.5-4.3         [facility]  
   
                                        Comment on above:   Performed By: #### 2  
691888, 8046481, 5105272, 3932395054,   
0771963375, 50429180, 5539687 ####[facility] (DEFAULT)[address] [place] [address]   
   
                      Protein [Mass/Vol] 5.5 g/dL   Low        6.5-8.1    [facility]  
   
                                        Comment on above:   Performed By: #### 2  
886895, 0530554, 8423823, 2994271640,   
9185480684, 99106604, 6972828 ####[facility] (DEFAULT)[address] [place] [address]   
   
                                                    CRPon 03-   
   
                      CRP        6.2 mg/dL  High       <=0.5      [facility]  
   
                                        Comment on above:   Performed By: #### 2  
567638, 1998222, 9674605, 6814203346,   
1941309914, 97477062, 9559818 ####[facility] (DEFAULT)[address] [place] [address]   
   
                                                    Myoglobinon 03-   
   
                      Myoglobin [Mass/Vol] 259.9 ng/mL High       17.4-105.7 [facility]  
   
                                        Comment on above:   Performed By: #### 2  
101362, 4484514, 8505604, 9971392488,   
8322615748, 94616469, 3057649 ####[facility] (DEFAULT)[address] [place] [address]   
   
                                                    UA Ejkgy2nw 03-   
   
                      UA Amorph. Rare       Normal                [facility]  
   
                                        Comment on above:   Order Comment: Urina  
lysis Microscopic order added on by   
Discern   
Expert Rules system.   
   
                                                            Performed By: #### 6  
697171, 19972179, [id number] ####[facility] (DEFAULT)[address] [place] [address]   
   
                      UA Bacteria Trace      [facility]  
   
                                        Comment on above:   Order Comment: Urina  
lysis Microscopic order added on by   
Discern   
Expert Rules system.   
   
                                                            Performed By: #### 6  
670907, 23828318, 0541932955 ####[facility] (DEFAULT)[address] [place] [address]   
   
                      UA Gran Cast 0-5        [facility]  
   
                                        Comment on above:   Order Comment: Urina  
lysis Microscopic order added on by   
Discern   
Expert Rules system.   
   
                                                            Performed By: #### 6  
832879, 29115764, [id number] ####[facility] (DEFAULT)[address] [place] [address]   
   
                      UA Hyal Cast 0-5        [facility]  
   
                                        Comment on above:   Order Comment: Urina  
lysis Microscopic order added on by   
Discern   
Expert Rules system.   
   
                                                            Performed By: #### 6  
722120, 13905619, [id number] ####[facility] (DEFAULT)[address] [place] [address]   
   
                      UA RBC     20-30      [facility]  
   
                                        Comment on above:   Order Comment: Urina  
lysis Microscopic order added on by   
Discern   
Expert Rules system.   
   
                                                            Performed By: #### 6  
357575, 86977834, [id number] ####[facility] (DEFAULT)[address] [place] [address]   
   
                      UA Squam Epi Rare       [facility]  
   
                                        Comment on above:   Order Comment: Urina  
lysis Microscopic order added on by   
Discern   
Expert Rules system.   
   
                                                            Performed By: #### 6  
677332, 80753693, [id number] ####[facility] (DEFAULT)[address] [place] [address]   
   
                      UA WBC     3-5        [facility]  
   
                                        Comment on above:   Order Comment: Urina  
lysis Microscopic order added on by   
Discern   
Expert Rules system.   
   
                                                            Performed By: #### 6  
377171, 84042683, [id number] ####[facility] (DEFAULT)[address] [place] [address]   
   
                                                    UA w Culture if Ind Standard  
on 03-   
   
                      Breakpoint UA ********   [facility]  
   
                                        Comment on above:   Performed By: #### 6  
042167, 04081919, 5606127064 ####[facility] (DEFAULT)[address] [place] [address]   
   
                      Color (U)  Yellow     Normal                [facility]  
   
                                        Comment on above:   Performed By: #### 6  
818723, 35429968, [id number] ####[facility] (DEFAULT)[address] [place] [address]   
   
                                Culture?        Indicated       Invalid   
Interpretation   
Code                                                [facility]  
   
                                        Comment on above:   Result Comment: Resu  
lt created by rule GL_MAGR_ADD_UA_CULT   
Result   
created by rule GL_MAGR_ADD_UA_CULT Result created by rule   
GL_MAGR_ADD_UA_CULT1 Result created by rule GL_MAGR_ADD_UA_CULT   
   
                                                            Performed By: #### 6  
093087, 70710422, [id number] ####[facility] (DEFAULT)[address] [place] [address]   
   
                                                    Glucose (U)   
[Mass/Vol]      Negative        Normal                          [facility]  
   
                                        Comment on above:   Performed By: #### 6  
373036, 35669978, 2919029240 ####[facility] (DEFAULT)[address] [place] [address]   
   
                      Ketones Ql (U) Negative   Normal                [facility]  
   
                                        Comment on above:   Performed By: #### 6  
212682, 48479035, 1322546276 ####[facility] (DEFAULT)[address] [place] [address]   
   
                                Micro?          Indicated       Invalid   
Interpretation   
Code                                                [facility]  
   
                                        Comment on above:   Result Comment: Resu  
lt created by rule GL_MAGR_ADD_UA_MICRO   
   
                                                            Performed By: #### 6  
678079, 70026718, [id number] ####[facility] (DEFAULT)[address] [place] [address]   
   
                      UA Bilirubin Negative   Normal                [facility]  
   
                                        Comment on above:   Performed By: #### 6  
291686, 95834181, [id number] ####[facility] (DEFAULT)[address] [place] [address]   
   
                      UA Blood   MODERATE   Abnormal   NEGATIVE   [facility]  
   
                                        Comment on above:   Performed By: #### 6  
410708, 65612713, [id number] ####[facility] (DEFAULT)[address] [place] [address]   
   
                      UA Clarity CLEAR      Normal     CLEAR      [facility]  
   
                                        Comment on above:   Performed By: #### 6  
850790, 53687633, [id number] ####[facility] (DEFAULT)[address] [place] [address]   
   
                      UA Leuk Est Negative   Normal     NEGATIVE   [facility]  
   
                                        Comment on above:   Performed By: #### 6  
433901, 33169864, [id number] ####[facility] (DEFAULT)[address] [place] [address]   
   
                      UA Nitrite Negative   Normal     NEGATIVE   [facility]  
   
                                        Comment on above:   Performed By: #### 6  
389834, 29554932, [id number] ####[facility] (DEFAULT)[address] [place] [address]   
   
                      UA pH      7.5        Normal     5-8        [facility]  
   
                                        Comment on above:   Performed By: #### 6  
112297, 76100207, [id number] ####[facility] (DEFAULT)[address] [place] [address]   
   
                      UA Protein Negative   Normal     NEGATIVE   [facility]  
   
                                        Comment on above:   Performed By: #### 6  
740526, 37283253, [id number] ####[facility] (DEFAULT)[address] [place] [address]   
   
                      UA Spec Grav 1.015      Normal     1.001-1.035 [facility]  
   
                                        Comment on above:   Performed By: #### 6  
649644, 35195742, [id number] ####[facility] (DEFAULT)[address] [place] [address]   
   
                      UA Urobilinogen 0.2 mg/dL  Normal     0.2-1.0    [facility]  
   
                                        Comment on above:   Performed By: #### 6  
281090, 98515292, [id number] ####[facility] (DEFAULT)[address] [place] [address]   
   
                      Urine Source Clean Catch Normal                [facility]  
   
                                        Comment on above:   Performed By: #### 6  
464374, 47700304, [id number] ####[facility] (DEFAULT)[address] [place] [address]   
   
                                                    XR Humerus Lefton 03-  
   
   
                      XR Humerus Left            Normal                [facility]  
   
                                                    .Auto Diff 1on 2024   
   
                      Auto Mono % 12 %       Normal     -12       [facility]  
   
                                        Comment on above:   Performed By: #### 1  
[id number], 9615694, 8372511, 57757378,   
9413230, 5819909 ####[facility] (DEFAULT)[address] [place] [address]   
   
                      Baso Abs#  0.0 x10    Normal     0.0-0.2    [facility]  
   
                                        Comment on above:   Performed By: #### 1  
[id number], 1256712, 2875107, 02443375,   
0638212, 3866853 ####[facility] (DEFAULT)[address] [place] [address]   
   
                                                    Basophils/100 WBC   
(Bld)           0.2 %           Normal          0.2-2.0         [facility]  
   
                                        Comment on above:   Performed By: #### 1  
[id number], 8845971, 1380187, 89259237,   
9093974, 1866688 ####[facility] (DEFAULT)[address] [place] [address]   
   
                      Eos Abs#   0.0 x10    Normal     0.0-0.4    [facility]  
   
                                        Comment on above:   Performed By: #### 1  
[id number], 5459803, 2765242, 11338557,   
5437939, 5750893 ####[facility] (DEFAULT)[address] [place] [address]   
   
                                                    Eosinophils/100 WBC   
(Bld)           1.0 %           Normal          0.9-4.0         [facility]  
   
                                        Comment on above:   Performed By: #### 1  
[id number], 8732525, 2456271, 28182942,   
0344354, 1919403 ####[facility] (DEFAULT)[address] [place] [address]   
   
                      Lymph Abs# 0.5 x10    Low        1.3-2.9    [facility]  
   
                                        Comment on above:   Performed By: #### 1  
[id number], 1438448, 4104418, 38687606,   
9956820, 4593613 ####[facility] (DEFAULT)[address] [place] [address]   
   
                                                    Lymphocytes/100 WBC   
(Bld)           16 %            Normal          14-48           [facility]  
   
                                        Comment on above:   Performed By: #### 1  
[id number], 1392622, 2694089, 90056531,   
1662561, 8310340 ####[facility] (DEFAULT)[address] [place] [address]   
   
                      Mono Abs#  0.4 x10    Normal     0.0-0.8    [facility]  
   
                                        Comment on above:   Performed By: #### 1  
[id number], 6672004, 7845008, 73853025,   
1863550, 6948835 ####[facility] (DEFAULT)[address] [place] [address]   
   
                      Neut Abs#  2.3 x10    Normal     1.5-9.2    [facility]  
   
                                        Comment on above:   Performed By: #### 1  
[id number], 4973479, 6806043, 91231206,   
0870524, 1449555 ####[facility] (DEFAULT)[address] [place] [address]   
   
                                                    Neutrophils/100 WBC   
(Bld)           71 %            Normal          44-88           [facility]  
   
                                        Comment on above:   Performed By: #### 1  
[id number], 3623788, 4867175, 44928959,   
7687782, 4561070 ####[facility] (DEFAULT)[address] [place] [address]   
   
                                                    CBC w/ Auto Diffon   
4   
   
                                                    Erythrocyte   
distribution width   
(RBC) [Ratio]   15.2 %          High            11.5-15.0       [facility]  
   
                                        Comment on above:   Performed By: #### 1  
[id number], 9121427, 6093052, 82690470,   
3895413, 9608353 ####[facility] (DEFAULT)[address] [place] [address]   
   
                                                    Hematocrit (Bld)   
[Volume fraction] 33.9 %          Low             34.8-51.9       [facility]  
   
                                        Comment on above:   Performed By: #### 1  
[id number], 3926182, 7501157, 93378090,   
5578560, 4726348 ####[facility] (DEFAULT)[address] [place] [address]   
   
                                                    Hemoglobin (Bld)   
[Mass/Vol]      11.5 g/dL       Low             11.8-17.7       [facility]  
   
                                        Comment on above:   Performed By: #### 1  
[id number], 8149675, 3878676, 49236244,   
8991801, 6382375 ####[facility] (DEFAULT)[address] [place] [address]   
   
                                Man Diff?       Auto            Invalid   
Interpretation   
Code                                                [facility]  
   
                                        Comment on above:   Performed By: #### 1  
[id number], 0262185, 6253744, 59724202,   
3699275, 7190653 ####[facility] (DEFAULT)[address] [place] [address]   
   
                                                    MCH (RBC) [Entitic   
mass]           30 pg           Normal          24-34           [facility]  
   
                                        Comment on above:   Performed By: #### 1  
[id number], 3349680, 6032353, 32850927,   
3024728, 2488231 ####[facility] (DEFAULT)[address] [place] [address]   
   
                                                    MCHC (RBC)   
[Mass/Vol]      34 g/dL         Normal          26-37           [facility]  
   
                                        Comment on above:   Performed By: #### 1  
[id number], 8261136, 9498359, 18372296,   
8501010, 7145599 ####[facility] (DEFAULT)[address] [place] [address]   
   
                                                    MCV (RBC) [Entitic   
vol]            88 fL           Normal                    [facility]  
   
                                        Comment on above:   Performed By: #### 1  
[id number], 3306678, 2135178, 29257397,   
5839929, 8868558 ####[facility] (DEFAULT)[address] [place] [address]   
   
                      Platelet   125 x10    Low        138-427    [facility]  
   
                                        Comment on above:   Performed By: #### 1  
407205947, 9211809, 9802205, 68287367,   
7416746, 3423137 ####[facility] (DEFAULT)[address] [place] [address]   
   
                                                    Platelet mean volume   
(Bld) [Entitic vol] 8.6 fL          Normal          6.3-10.2        [facility]  
   
                                        Comment on above:   Performed By: #### 1  
[id number], 2065345, 0067009, 77568814,   
6022631, 6070147 ####[facility] (DEFAULT)[address] [place] [address]   
   
                      RBC        3.85 x10   Normal     3.70-5.30  [facility]  
   
                                        Comment on above:   Performed By: #### 1  
417838060, 3496868, 7434056, 54561382,   
1840386, 6825849 ####[facility] (DEFAULT)[address] [place] [address]   
   
                      WBC        3.2 x10    Low        3.5-10.5   [facility]  
   
                                        Comment on above:   Performed By: #### 1  
780196673, 3224987, 7086232, 17485289,   
1628544, 7279793 ####[facility] (DEFAULT)[address] [place] [address]   
   
                                                    CKon 2024   
   
                                                    CK [Catalytic   
activity/Vol]   675 U/L         High                      [facility]  
   
                                        Comment on above:   Performed By: #### 1  
[id number], 1158027, 2127869, 45218027,   
6035470, 1583516 ####[facility] (DEFAULT)[address] [place] [address]   
   
                                                    CMP Standardon 2024   
   
                                eGFR Non AA     >60             Invalid   
Interpretation   
Code                                                [facility]  
   
                                        Comment on above:   Performed By: #### 1  
[id number], 7626562, 8511811, 92616860,   
3361850, 3249488 ####[facility] (DEFAULT)[address] [place] [address]   
   
                                eGFR AA         >60             Invalid   
Interpretation   
Code                                                [facility]  
   
                                        Comment on above:   Performed By: #### 1  
[id number], 6949564, 9633213, 32977042,   
9314823, 0233631 ####[facility] (DEFAULT)[address] [place] [address]   
   
                      Albumin [Mass/Vol] 2.7 g/dL   Low        3.5-5.0    [facility]  
   
                                        Comment on above:   Performed By: #### 1  
925597967, 5605370, 8206873, 66339008,   
4717250, 7668167 ####[facility] (DEFAULT)[address] [place] [address]   
   
                                                    Albumin/Globulin   
[Mass ratio]    1.1 {ratio}     Low             1.4-2.6         [facility]  
   
                                        Comment on above:   Performed By: #### 1  
050223087, 0085007, 8271225, 08811356,   
1743122, 2217706 ####[facility] (DEFAULT)[address] [place] [address]   
   
                      Alk Phos   47 IU/L    Normal     32-91      [facility]  
   
                                        Comment on above:   Performed By: #### 1  
[id number], 0755014, 0861741, 65989152,   
7761686, 7179683 ####[facility] (DEFAULT)[address] [place] [address]   
   
                                                    ALT [Catalytic   
activity/Vol]   8.0 U/L         Low             17.0-63.0       [facility]  
   
                                        Comment on above:   Performed By: #### 1  
[id number], 3648984, 5886421, 40342804,   
0691443, 2465293 ####[facility] (DEFAULT)[address] [place] [address]   
   
                                                    Anion gap   
[Moles/Vol]     7.8 mmol/L      Normal          5.0-19.0        [facility]  
   
                                        Comment on above:   Performed By: #### 1  
[id number], 6390736, 2560093, 27503052,   
6604619, 4630318 ####[facility] (DEFAULT)[address] [place] [address]   
   
                                                    AST [Catalytic   
activity/Vol]   90 U/L          High            15-41           [facility]  
   
                                        Comment on above:   Performed By: #### 1  
746548573, 7201089, 4595102, 12640872,   
8858127, 6255914 ####[facility] (DEFAULT)[address] [place] [address]   
   
                      Bili Total 0.7 mg/dL  Normal     0.3-1.2    [facility]  
   
                                        Comment on above:   Performed By: #### 1  
154365392, 7520312, 0267596, 13866234,   
2416739, 7676187 ####[facility] (DEFAULT)[address] [place] [address]   
   
                      Calcium [Mass/Vol] 7.9 mg/dL  Low        8.9-10.3   [facility]  
   
                                        Comment on above:   Performed By: #### 1  
458075248, 0704908, 1261857, 94996345,   
3430081, 1010635 ####[facility] (DEFAULT)[address] [place] [address]   
   
                      Chloride [Moles/Vol] 110 mmol/L Normal     101-111    [facility]  
   
                                        Comment on above:   Performed By: #### 1  
291790103, 2812263, 8637092, 30391159,   
9944347, 4440557 ####[facility] (DEFAULT)[address] [place] [address]   
   
                      CO2 [Moles/Vol] 27 mmol/L  Normal     21-32      [facility]  
   
                                        Comment on above:   Performed By: #### 1  
[id number], 8648337, 3198086, 02865401,   
2074055, 5214092 ####[facility] (DEFAULT)[address] [place] [address]   
   
                                                    Creatinine   
[Mass/Vol]      1.13 mg/dL      Normal          0.90-1.30       [facility]  
   
                                        Comment on above:   Performed By: #### 1  
661541417, 9118566, 8285509, 02975195,   
3590186, 4243085 ####[facility] (DEFAULT)[address] [place] [address]   
   
                                                    Globulin (S)   
[Mass/Vol]      2.4 g/dL        Normal          1.5-4.3         [facility]  
   
                                        Comment on above:   Performed By: #### 1  
412556484, 9375321, 3653340, 43857485,   
5908027, 7127854 ####[facility] (DEFAULT)[address] [place], [place] [address]   
   
                      Glucose [Mass/Vol] 101.0 mg/dL Normal     74.0-118.0 [facility]  
   
                                        Comment on above:   Performed By: #### 1  
074692388, 3228719, 5272620, 04801774,   
7166224, 4678413 ####[facility] (DEFAULT)[address] [place] [address]   
   
                                Osmolality      284 mOsm/L      Invalid   
Interpretation   
Code                                                [facility]  
   
                                        Comment on above:   Performed By: #### 1  
[id number], 2278835, 2745420, 34281691,   
3750853, 9907756 ####[facility] (DEFAULT)[address] [place] [address]   
   
                                                    Potassium   
[Moles/Vol]     3.8 mmol/L      Normal          3.6-5.1         [facility]  
   
                                        Comment on above:   Performed By: #### 1  
[id number], 9840774, 2276015, 39031426,   
5946052, 5800798 ####[facility] (DEFAULT)[address] [place] [address]   
   
                      Protein [Mass/Vol] 5.1 g/dL   Low        6.5-8.1    [facility]  
   
                                        Comment on above:   Performed By: #### 1  
[id number], 9712940, 4265497, 88334988,   
2665449, 8571609 ####[facility] (DEFAULT)[address] [place] [address]   
   
                      Sodium [Moles/Vol] 141.0 mmol/L Normal     136.0-144.0 [facility]  
   
                                        Comment on above:   Performed By: #### 1  
628469177, 9522815, 3002225, 51499520,   
5132214, 1418992 ####[facility] (DEFAULT)[address] [place] [address]   
   
                                                    Urea nitrogen   
[Mass/Vol]      19 mg/dL        Normal          8-26            [facility]  
   
                                        Comment on above:   Performed By: #### 1  
[id number], 7431657, 5442591, 76606914,   
9649581, 9958908 ####[facility] (DEFAULT)[address] [place] [address]   
   
                                                    Urea   
nitrogen/Creatinine   
[Mass ratio]    16.8 mg/mg      High            4.6-16.2        [facility]  
   
                                        Comment on above:   Performed By: #### 1  
[id number], 7351582, 9150640, 89391934,   
3744874, 8782598 ####[facility] (DEFAULT)[address] [place] [address]   
   
                                                    CRPon 2024   
   
                      CRP        5.5 mg/dL  High       <=0.5      [facility]  
   
                                        Comment on above:   Performed By: #### 1  
[id number], 5009281, 8024234, 07853466,   
8191608, 3324497 ####[facility] (DEFAULT)[address] [place] [address]   
   
                                                    Myoglobinon 2024   
   
                      Myoglobin [Mass/Vol] 335.9 ng/mL High       17.4-105.7 [facility]  
   
                                        Comment on above:   Performed By: #### 1  
[id number], 2730084, 1381048, 07976462,   
7186135, 8167854 ####[facility] (DEFAULT)[address] [place] [address]   
   
                                                    .Auto Diff 1on 2024   
   
                      Auto Mono % 13 %       High       1-12       [facility]  
   
                                        Comment on above:   Performed By: #### 7  
570967, 4061557, 3952176, 6589837477,   
7930968, 55034405 ####[facility] (DEFAULT)[address] [place] [address]   
   
                      Baso Abs#  0.0 x10    Normal     0.0-0.2    [facility]  
   
                                        Comment on above:   Performed By: #### 7  
257272, 2699871, 6642819, 3096612932,   
3074723, 35799775 ####[facility] (DEFAULT)[address] [place] [address]   
   
                                                    Basophils/100 WBC   
(Bld)           0.3 %           Normal          0.2-2.0         [facility]  
   
                                        Comment on above:   Performed By: #### 7  
806847, 5636916, 2085059, 6237447145,   
5872186, 12026604 ####[facility] (DEFAULT)[address] [place] [address]   
   
                      Eos Abs#   0.0 x10    Normal     0.0-0.4    [facility]  
   
                                        Comment on above:   Performed By: #### 7  
946196, 7858563, 2802768, 5958877206,   
9762972, 71315405 ####[facility] (DEFAULT)[address] [place] [address]   
   
                                                    Eosinophils/100 WBC   
(Bld)           0.3 %           Low             0.9-4.0         [facility]  
   
                                        Comment on above:   Performed By: #### 7  
376890, 5212482, 6308040, 1180189799,   
4180391, 86089668 ####[facility] (DEFAULT)[address] [place] [address]   
   
                      Lymph Abs# 0.5 x10    Low        1.3-2.9    [facility]  
   
                                        Comment on above:   Performed By: #### 7  
407927, 0002295, 6193077, 3799027073,   
9395254, 09188672 ####[facility] (DEFAULT)[address] [place] [address]   
   
                                                    Lymphocytes/100 WBC   
(Bld)           15 %            Normal          14-48           [facility]  
   
                                        Comment on above:   Performed By: #### 7  
515774, 5744001, 5419387, 0166359000,   
7395694, 25638121 ####[facility] (DEFAULT)[address] [place] [address]   
   
                      Mono Abs#  0.5 x10    Normal     0.0-0.8    [facility]  
   
                                        Comment on above:   Performed By: #### 7  
804073, 0036948, 4613636, 5050928460,   
8044033, 78213778 ####[facility] (DEFAULT)[address] [place] [address]   
   
                      Neut Abs#  2.4 x10    Normal     1.5-9.2    [facility]  
   
                                        Comment on above:   Performed By: #### 7  
110899, 7278083, 1501118, 8373267952,   
8371029, 24242484 ####[facility] (DEFAULT)[address] [place] [address]   
   
                                                    Neutrophils/100 WBC   
(Bld)           71 %            Normal          44-88           [facility]  
   
                                        Comment on above:   Performed By: #### 7  
691500, 7313233, 4385379, 5736767071,   
1517332, 22287047 ####[facility] (DEFAULT)[address] [place] [address]   
   
                                                    CBC w/ Auto Diffon   
4   
   
                                                    Erythrocyte   
distribution width   
(RBC) [Ratio]   15.6 %          High            11.5-15.0       [facility]  
   
                                        Comment on above:   Performed By: #### 7  
075624, 3842789, 8242177, 0538025933,   
9196803, 36478381 ####[facility] (DEFAULT)[address] [place] [address]   
   
                                                    Hematocrit (Bld)   
[Volume fraction] 32.7 %          Low             34.8-51.9       [facility]  
   
                                        Comment on above:   Performed By: #### 7  
654338, 1462111, 5998763, 5153956307,   
9149530, 09161170 ####[facility] (DEFAULT)[address] [place] [address]   
   
                                                    Hemoglobin (Bld)   
[Mass/Vol]      11.0 g/dL       Low             11.8-17.7       [facility]  
   
                                        Comment on above:   Performed By: #### 7  
807499, 9935821, 8496038, 7539858364,   
2255874, 44141055 ####[facility] (DEFAULT)[address] [place] [address]   
   
                                Man Diff?       Auto            Invalid   
Interpretation   
Code                                                [facility]  
   
                                        Comment on above:   Performed By: #### 7  
582436, 4171512, 7297450, [id number],   
8082428, 65034776 ####[facility] (DEFAULT)[address] [place] [address]   
   
                                                    MCH (RBC) [Entitic   
mass]           30 pg           Normal          24-34           [facility]  
   
                                        Comment on above:   Performed By: #### 7  
314677, 3320488, 4747488, 2178106601,   
6212918, 55019501 ####[facility] (DEFAULT)[address] [place] [address]   
   
                                                    MCHC (RBC)   
[Mass/Vol]      34 g/dL         Normal          26-37           [facility]  
   
                                        Comment on above:   Performed By: #### 7  
876561, 5355872, 1824193, [id number],   
3080168, 29801127 ####[facility] (DEFAULT)[address] [place] [address]   
   
                                                    MCV (RBC) [Entitic   
vol]            88 fL           Normal                    [facility]  
   
                                        Comment on above:   Performed By: #### 7  
360517, 8618860, 5100636, [id number],   
6161113, 53423456 ####[facility] (DEFAULT)[address] [place] [address]   
   
                      Platelet   112 x10    Low        138-427    [facility]  
   
                                        Comment on above:   Performed By: #### 7  
032667, 4894253, 1056223, 3207099973,   
8508097, 74687532 ####[facility] (DEFAULT)[address] [place] [address]   
   
                                                    Platelet mean volume   
(Bld) [Entitic vol] 8.8 fL          Normal          6.3-10.2        [facility]  
   
                                        Comment on above:   Performed By: #### 7  
013235, 8138833, 4077882, [id number],   
7326455, 02381957 ####[facility] (DEFAULT)[address] [place] [address]   
   
                      RBC        3.71 x10   Normal     3.70-5.30  [facility]  
   
                                        Comment on above:   Performed By: #### 7  
123175, 6967737, 2845613, 9605083530,   
8466918, 03048082 ####[facility] (DEFAULT)[address] [place] [address]   
   
                      WBC        3.4 x10    Low        3.5-10.5   [facility]  
   
                                        Comment on above:   Performed By: #### 7  
689122, 7643241, 2378050, 8432425541,   
4831534, 31266644 ####[facility] (DEFAULT)[address] [place] [address]   
   
                                                    CKon 2024   
   
                                                    CK [Catalytic   
activity/Vol]   1412 U/L        High                      [facility]  
   
                                        Comment on above:   Performed By: #### 7  
221828, 1887547, 7522654, 6410052003,   
6123952, 90205137 ####[facility] (DEFAULT)[address] [place] [address]   
   
                                                    CMP Standardon 2024   
   
                                eGFR Non AA     55 mL/min/1.73m2 Invalid   
Interpretation   
Code                                                [facility]  
   
                                        Comment on above:   Performed By: #### 7  
934333, 4040956, 5682930, [id number],   
3845218, 32373049 ####[facility] (DEFAULT)[address] [place] [address]   
   
                                eGFR AA         >60             Invalid   
Interpretation   
Code                                                [facility]  
   
                                        Comment on above:   Performed By: #### 7  
387604, 3597165, 2646811, 5028899127,   
5069431, 06929909 ####[facility] (DEFAULT)[address] [place] [address]   
   
                      Albumin [Mass/Vol] 2.6 g/dL   Low        3.5-5.0    [facility]  
   
                                        Comment on above:   Performed By: #### 7  
708954, 6585830, 4356502, 5150499063,   
1924225, 81557374 ####[facility] (DEFAULT)[address] [place] [address]   
   
                      Alk Phos   46 IU/L    Normal     32-91      [facility]  
   
                                        Comment on above:   Performed By: #### 7  
848201, 0706598, 0592613, 2555373800,   
7453377, 37068214 ####[facility] (DEFAULT)[address] [place] [address]   
   
                                                    ALT [Catalytic   
activity/Vol]   7.0 U/L         Low             17.0-63.0       [facility]  
   
                                        Comment on above:   Performed By: #### 7  
154956, 9686377, 4921183, 8386454362,   
3055190, 53343016 ####[facility] (DEFAULT)[address] [place] [address]   
   
                                                    AST [Catalytic   
activity/Vol]   109 U/L         High            15-41           [facility]  
   
                                        Comment on above:   Performed By: #### 7  
054959, 8889579, 2220404, 3442769970,   
5259452, 46264236 ####[facility] (DEFAULT)[address] [place] [address]   
   
                      Bili Total 0.6 mg/dL  Normal     0.3-1.2    [facility]  
   
                                        Comment on above:   Performed By: #### 7  
392112, 6542584, 9895533, 7942119378,   
7666527, 93988581 ####[facility] (DEFAULT)[address] [place] [address] [place] [address]   
   
                      Calcium [Mass/Vol] 7.7 mg/dL  Low        8.9-10.3   [facility]  
   
                                        Comment on above:   Performed By: #### 7  
441115, 2805712, 6523434, 2081880933,   
2777704, 21869412 ####[facility] (DEFAULT)[address] [place] [address]   
   
                      Chloride [Moles/Vol] 111 mmol/L Normal     101-111    [facility]  
   
                                        Comment on above:   Performed By: #### 7  
740233, 3366335, 4686260, 2477183444,   
9441899, 35888996 ####[facility] (DEFAULT)[address] [place] [address]   
   
                      CO2 [Moles/Vol] 24 mmol/L  Normal     21-32      [facility]  
   
                                        Comment on above:   Performed By: #### 7  
961414, 5977245, 6905345, 5752166216,   
6619606, 35087283 ####[facility] (DEFAULT)[address] [place] [address]   
   
                                                    Creatinine   
[Mass/Vol]      1.24 mg/dL      Normal          0.90-1.30       [facility]  
   
                                        Comment on above:   Performed By: #### 7  
708463, 2172195, 8303324, 7067155604,   
7999574, 30981219 ####[facility] (DEFAULT)[address] [place] [address]   
   
                      Glucose [Mass/Vol] 103.0 mg/dL Normal     74.0-118.0 [facility]  
   
                                        Comment on above:   Performed By: #### 7  
697215, 0740595, 8894876, 5804573586,   
8809092, 34164339 ####[facility] (DEFAULT)[address] [place] [address]   
   
                                                    Potassium   
[Moles/Vol]     3.7 mmol/L      Normal          3.6-5.1         [facility]  
   
                                        Comment on above:   Performed By: #### 7  
483136, 4750905, 6745865, 3548748878,   
5702020, 61678543 ####[facility] (DEFAULT)[address] [place] [address]   
   
                      Protein [Mass/Vol] 5.0 g/dL   Low        6.5-8.1    [facility]  
   
                                        Comment on above:   Performed By: #### 7  
917260, 9077426, 3692371, 8792881592,   
3572968, 26069362 ####[facility] (DEFAULT)[address] [place] [address]   
   
                      Sodium [Moles/Vol] 140.0 mmol/L Normal     136.0-144.0 [facility]  
   
                                        Comment on above:   Performed By: #### 7  
581432, 2903658, 0937562, 1887732547,   
0623433, 72268456 ####[facility] (DEFAULT)[address] [place] [address]   
   
                                                    Urea nitrogen   
[Mass/Vol]      28 mg/dL        High            8-            [facility]  
   
                                        Comment on above:   Performed By: #### 7  
932412, 6743993, 2828173, 8503947880,   
7101429, 51144641 ####[facility] (DEFAULT)[address] [place] [address]   
   
                                                    Albumin/Globulin   
[Mass ratio]    1.0 {ratio}     Low             1.4-2.6         [facility]  
   
                                        Comment on above:   Performed By: #### 7  
020646, 0439519, 2107841, 8975342705,   
6456377, 80437099 ####[facility] (DEFAULT)[address] [place] [address]   
   
                                                    Anion gap   
[Moles/Vol]     8.7 mmol/L      Normal          5.0-19.0        [facility]  
   
                                        Comment on above:   Performed By: #### 7  
614124, 4007924, 2800289, 7648267815,   
2476137, 51665271 ####[facility] (DEFAULT)[address] [place] [address]   
   
                                                    Globulin (S)   
[Mass/Vol]      2.4 g/dL        Normal          1.5-4.3         [facility]  
   
                                        Comment on above:   Performed By: #### 7  
809202, 5864561, 0431702, 7649132391,   
2326757, 22031333 ####[facility] (DEFAULT)[address] [place] [address]   
   
                                Osmolality      285 mOsm/L      Invalid   
Interpretation   
Code                                                [facility]  
   
                                        Comment on above:   Performed By: #### 7  
232590, 7552514, 5823234, 7273227417,   
2312323, 74925912 ####[facility] (DEFAULT)[address] [place] [address]   
   
                                                    Urea   
nitrogen/Creatinine   
[Mass ratio]    22.5 mg/mg      High            4.6-16.2        [facility]  
   
                                        Comment on above:   Performed By: #### 7  
940616, 8650806, 4083327, 1650061929,   
2731855, 37064534 ####[facility] (DEFAULT)[address] [place] [address]   
   
                                                    CRPon 2024   
   
                      CRP        6.7 mg/dL  High       <=0.5      [facility]  
   
                                        Comment on above:   Performed By: #### 7  
507169, 4047095, 2874984, 3266518987,   
0991977, 13315305 ####[facility] (DEFAULT)[address] [place] [address]   
   
                                                    Myoglobinon 2024   
   
                      Myoglobin [Mass/Vol] 632.8 ng/mL High       17.4-105.7 [facility]  
   
                                        Comment on above:   Performed By: #### 7  
417081, 3212186, 3836494, 2291795131,   
0247532, 97509909 ####[facility] (DEFAULT)[address] [place] [address]   
   
                                                    Nutrition Noteon 2024   
   
                      Nutrition Note            Normal                [facility]  
   
                                                    POCT Glucose Levelon   
024   
   
                      Glucose [Mass/Vol] 97 mg/dL   Normal          [facility]  
   
                                        Comment on above:   Performed By: #### 4  
[id number] ####[facility]   
(DEFAULT)[address] [place] [address]   
   
                                                    Progress Note - Nurseon    
   
                                                    Progress Note -   
Nurse                           [facility]  
   
                                                    Telemetry Stripson    
   
                                        Telemetry Strips    [id number]  
60  
[id number]  
TGTIFF              Normal                                  [facility]  
   
                                                    .Auto Diff 1on 2024   
   
                      Auto Mono % 13 %       High       1-12       [facility]  
   
                                        Comment on above:   Performed By: #### 2  
072825, 7480646, 625755048, 46522008,   
5099268, 9777192, 6142436963 ####[facility] (DEFAULT)[address] [place] [address]   
   
                      Baso Abs#  0.0 x10    Normal     0.0-0.2    [facility]  
   
                                        Comment on above:   Performed By: #### 2  
296049, 8295362, 569511901, 18121138,   
7142654, 6365863, 9976665247 ####[facility] (DEFAULT)[address] [place] [address]   
   
                                                    Basophils/100 WBC   
(Bld)           0.2 %           Normal          0.2-2.0         [facility]  
   
                                        Comment on above:   Performed By: #### 2  
142677, 4727032, 713685926, 42880293,   
4260041, 9307108, 3016453985 ####[facility] (DEFAULT)[address] [place] [address]   
   
                      Eos Abs#   0.0 x10    Normal     0.0-0.4    [facility]  
   
                                        Comment on above:   Performed By: #### 2  
102837, 4274287, 074539792, 40126810,   
8327832, 3509746, 8515998512 ####[facility] (DEFAULT)[address] [place] [address]   
   
                                                    Eosinophils/100 WBC   
(Bld)           0.0 %           Low             0.9-4.0         [facility]  
   
                                        Comment on above:   Performed By: #### 2  
212475, 3598781, 542632521, 43528432,   
5581971, 2364479, 7822514129 ####[facility] (DEFAULT)[address] [place] [address]   
   
                      Lymph Abs# 0.5 x10    Low        1.3-2.9    [facility]  
   
                                        Comment on above:   Performed By: #### 2  
985179, 4135279, 117601401, 63674781,   
6842796, 4358042, 0959363770 ####[facility] (DEFAULT)[address] [place] [address]   
   
                                                    Lymphocytes/100 WBC   
(Bld)           10 %            Low             14-48           [facility]  
   
                                        Comment on above:   Performed By: #### 2  
288036, 6796188, 770029907, 77994240,   
4394835, 9003565, 8536964587 ####[facility] (DEFAULT)[address] [place] [address]   
   
                      Mono Abs#  0.6 x10    Normal     0.0-0.8    [facility]  
   
                                        Comment on above:   Performed By: #### 2  
634780, 2615764, 269668730, 21820741,   
7579777, 8297012, 9441297913 ####[facility] (DEFAULT)[address] [place] [address]   
   
                      Neut Abs#  3.6 x10    Normal     1.5-9.2    [facility]  
   
                                        Comment on above:   Performed By: #### 2  
833312, 1764251, 674576544, 60880262,   
4386808, 1668426, 7787983533 ####[facility] (DEFAULT)[address] [place] [address]   
   
                                                    Neutrophils/100 WBC   
(Bld)           77 %            Normal          44-88           [facility]  
   
                                        Comment on above:   Performed By: #### 2  
658450, 7624965, 639616213, 50200511,   
7538262, 2819895, 1468695685 ####[facility] (DEFAULT)[address] [place] [address]   
   
                                                    C Urineon 2024   
   
                      C Urine               Normal                [facility]  
   
                                        Comment on above:   Performed By: #### 1  
[id number], 1065931, 34135448 ####[facility] (DEFAULT)[address] [place] [address]   
   
                                                    CBC w/ Auto Diffon    
   
                                                    Erythrocyte   
distribution width   
(RBC) [Ratio]   15.4 %          High            11.5-15.0       [facility]  
   
                                        Comment on above:   Performed By: #### 2  
314501, 7461360, 997964753, 03535502,   
3791576, 4905956, 8212575055 ####[facility] (DEFAULT)[address] [place] [address]   
   
                                                    Hematocrit (Bld)   
[Volume fraction] 33.2 %          Low             34.8-51.9       [facility]  
   
                                        Comment on above:   Performed By: #### 2  
790189, 0928831, 238567513, 72290153,   
1946873, 1758411, 1637929914 ####[facility] (DEFAULT)[address] [place] [address]   
   
                                                    Hemoglobin (Bld)   
[Mass/Vol]      11.3 g/dL       Low             11.8-17.7       [facility]  
   
                                        Comment on above:   Performed By: #### 2  
207329, 3802569, 931905187, 98860275,   
8027470, 9374119, 5618947105 ####[facility] (DEFAULT)[address] [place] [address]   
   
                                Man Diff?       Auto            Invalid   
Interpretation   
Code                                                [facility]  
   
                                        Comment on above:   Performed By: #### 2  
626324, 3085489, 728418724, 08259177,   
6765370, 3536425, 6911619616 ####[facility] (DEFAULT)[address] [place] [address]   
   
                                                    MCH (RBC) [Entitic   
mass]           30 pg           Normal          24-34           [facility]  
   
                                        Comment on above:   Performed By: #### 2  
521598, 2057167, 109479184, 14350439,   
0448435, 3648330, 3267684737 ####[facility] (DEFAULT)[address] [place] [address]   
   
                                                    MCHC (RBC)   
[Mass/Vol]      34 g/dL         Normal          26-37           [facility]  
   
                                        Comment on above:   Performed By: #### 2  
429308, 4953745, 900334205, 81951659,   
8957293, 4434207, 6682988477 ####[facility] (DEFAULT)[address] [place] [address]   
   
                                                    MCV (RBC) [Entitic   
vol]            88 fL           Normal                    [facility]  
   
                                        Comment on above:   Performed By: #### 2  
512367, 9368261, 387139031, 45218654,   
9526916, 5355377, 4619414723 ####[facility] (DEFAULT)[address] [place] [address]   
   
                      Platelet   112 x10    Low        138-427    [facility]  
   
                                        Comment on above:   Performed By: #### 2  
765316, 5038626, 198393174, 56613547,   
0471276, 4142009, 2613668370 ####[facility] (DEFAULT)[address] [place] [address]   
   
                                                    Platelet mean volume   
(Bld) [Entitic vol] 8.6 fL          Normal          6.3-10.2        [facility]  
   
                                        Comment on above:   Performed By: #### 2  
065643, 2953066, 006878974, 25847061,   
6040704, 6234395, 1330804748 ####[facility] (DEFAULT)[address] [place] [address]   
   
                      RBC        3.77 x10   Normal     3.70-5.30  [facility]  
   
                                        Comment on above:   Performed By: #### 2  
922617, 4190140, 580969563, 43837216,   
1907068, 3284870, 6033594783 ####[facility] (DEFAULT)[address] [place] [address]   
   
                      WBC        4.7 x10    Normal     3.5-10.5   [facility]  
   
                                        Comment on above:   Performed By: #### 2  
019072, 4491451, 723027225, 48001060,   
2309213, 0777743, 5182246802 ####[facility] (DEFAULT)[address] [place] [address]   
   
                                                    CKon 2024   
   
                                                    CK [Catalytic   
activity/Vol]   2005 U/L        High                      [facility]  
   
                                        Comment on above:   Performed By: #### 2  
590506, 3687884, 706532834, 08828584,   
0019095, 9504018, [id number] ####[facility] (DEFAULT)[address] [place] [address]   
   
                                                    CMP Standardon 2024   
   
                                                    Anion gap   
[Moles/Vol]     8.6 mmol/L      Normal          5.0-19.0        [facility]  
   
                                        Comment on above:   Performed By: #### 2  
795826, 0691877, 282866601, 92362918,   
4974088, 5213591, [id number] ####[facility] (DEFAULT)[address] [place] [address]   
   
                      Calcium [Mass/Vol] 7.6 mg/dL  Low        8.9-10.3   [facility]  
   
                                        Comment on above:   Performed By: #### 2  
497393, 8292729, 528365264, 75756305,   
3813818, 3733675, 4946350063 ####[facility] (DEFAULT)[address] [place] [address]   
   
                      Chloride [Moles/Vol] 111 mmol/L Normal     101-111    [facility]  
   
                                        Comment on above:   Performed By: #### 2  
610966, 3751262, 295907231, 39180644,   
7676978, 2734362, [id number] ####[facility] (DEFAULT)[address] [place] [address]   
   
                      CO2 [Moles/Vol] 23 mmol/L  Normal     21-32      [facility]  
   
                                        Comment on above:   Performed By: #### 2  
043645, 3860874, 069280404, 17927972,   
8928413, 2973809, [id number] ####[facility] (DEFAULT)[address] [place] [address]   
   
                      Glucose [Mass/Vol] 106.0 mg/dL Normal     74.0-118.0 [facility]  
   
                                        Comment on above:   Performed By: #### 2  
639673, 4603577, 025531809, 19677023,   
9704860, 5935551, 1447501597 ####[facility] (DEFAULT)[address] [place] [address]   
   
                                Osmolality      284 mOsm/L      Invalid   
Interpretation   
Code                                                [facility]  
   
                                        Comment on above:   Performed By: #### 2  
017466, 8773966, 233063589, 87990282,   
5692655, 1697794, [id number] ####[facility] (DEFAULT)[address] [place] [address]   
   
                                                    Potassium   
[Moles/Vol]     3.6 mmol/L      Normal          3.6-5.1         [facility]  
   
                                        Comment on above:   Performed By: #### 2  
560959, 0308917, 447518200, 41748951,   
6621809, 8942473, [id number] ####[facility] (DEFAULT)[address] [place] [address]   
   
                      Sodium [Moles/Vol] 139.0 mmol/L Normal     136.0-144.0 [facility]  
   
                                        Comment on above:   Performed By: #### 2  
496041, 5085544, 711339529, 53121740,   
1376366, 3187111, [id number] ####[facility] (DEFAULT)[address] [place] [address]   
   
                                eGFR Non AA     44 mL/min/1.73m2 Invalid   
Interpretation   
Code                                                [facility]  
   
                                        Comment on above:   Performed By: #### 2  
005095, 5394365, 421887078, 60037991,   
9079038, 7776333, [id number] ####[facility] (DEFAULT)[address] [place] [address]   
   
                                eGFR AA         54 mL/min/1.73m2 Invalid   
Interpretation   
Code                                                [facility]  
   
                                        Comment on above:   Performed By: #### 2  
841821, 6121234, 000830328, 77440928,   
8078535, 7041359, [id number] ####[facility] (DEFAULT)[address] [place] [address]   
   
                      Albumin [Mass/Vol] 2.7 g/dL   Low        3.5-5.0    [facility]  
   
                                        Comment on above:   Performed By: #### 2  
679419, 9982002, 911075057, 56292573,   
2326891, 6978597, [id number] ####[facility] (DEFAULT)[address] [place] [address]   
   
                                                    Albumin/Globulin   
[Mass ratio]    1.2 {ratio}     Low             1.4-2.6         [facility]  
   
                                        Comment on above:   Performed By: #### 2  
722247, 9398343, 079450743, 60746199,   
8901195, 0780131, 3826861707 ####[facility] (DEFAULT)[address] [place] [address]   
   
                      Alk Phos   48 IU/L    Normal     32-91      [facility]  
   
                                        Comment on above:   Performed By: #### 2  
039963, 7165071, 451201144, 39397344,   
8830780, 3325011, 7702001040 ####[facility] (DEFAULT)[address] [place] [address]   
   
                                                    ALT [Catalytic   
activity/Vol]   7.0 U/L         Low             17.0-63.0       [facility]  
   
                                        Comment on above:   Performed By: #### 2  
630909, 8504685, 630171333, 96912094,   
5909130, 0964668, 6357853828 ####[facility] (DEFAULT)[address] [place] [address]   
   
                                                    AST [Catalytic   
activity/Vol]   128 U/L         High            15-41           [facility]  
   
                                        Comment on above:   Performed By: #### 2  
938049, 1465427, 623615555, 41126152,   
9098938, 6387496, 7555770591 ####[facility] (DEFAULT)[address] [place] [address]   
   
                      Bili Total 0.6 mg/dL  Normal     0.3-1.2    [facility]  
   
                                        Comment on above:   Performed By: #### 2  
840923, 0601373, 390191176, 82922719,   
3015683, 3103464, 2195150917 ####[facility] (DEFAULT)[address] [place] [address]   
   
                                                    Creatinine   
[Mass/Vol]      1.50 mg/dL      High            0.90-1.30       [facility]  
   
                                        Comment on above:   Performed By: #### 2  
705421, 1161302, 690791856, 34383852,   
1036949, 2746763, 0575998578 ####[facility] (DEFAULT)[address] [place] [address]   
   
                                                    Globulin (S)   
[Mass/Vol]      2.2 g/dL        Normal          1.5-4.3         [facility]  
   
                                        Comment on above:   Performed By: #### 2  
557252, 4579847, 540478866, 91396172,   
6040792, 8858015, [id number] ####[facility] (DEFAULT)[address] [place] [address]   
   
                      Protein [Mass/Vol] 4.9 g/dL   Low        6.5-8.1    [facility]  
   
                                        Comment on above:   Performed By: #### 2  
279418, 0302440, 369288454, 20599500,   
6605646, 8771559, 8433228669 ####[facility] (DEFAULT)[address] [place] [address]   
   
                                                    Urea nitrogen   
[Mass/Vol]      30 mg/dL        High            8-26            [facility]  
   
                                        Comment on above:   Performed By: #### 2  
026460, 5961753, 143965539, 29516902,   
7201288, 9358048, 3958410315 ####[facility] (DEFAULT)[address] [place] [address]   
   
                                                    Urea   
nitrogen/Creatinine   
[Mass ratio]    20.0 mg/mg      High            4.6-16.2        [facility]  
   
                                        Comment on above:   Performed By: #### 2  
907944, 1442995, 890492656, 25409127,   
9213651, 1040670, 6238579010 ####[facility] (DEFAULT)[address] [place] [address]   
   
                                                    CRPon 2024   
   
                      CRP        8.4 mg/dL  High       <=0.5      [facility]  
   
                                        Comment on above:   Performed By: #### 2  
209807, 2961400, 089537477, 64924702,   
9746917, 6636676, [id number] ####[facility] (DEFAULT)[address] [place] [address]   
   
                                                    Myoglobinon 2024   
   
                      Myoglobin [Mass/Vol] 1491.6 ng/mL High       17.4-105.7 [facility]  
   
                                        Comment on above:   Performed By: #### 2  
112742, 4133240, 005370805, 07966202,   
6386830, 7589232, [id number] ####[facility] (DEFAULT)[address] [place] [address]   
   
                                                    Procalcitoninon 2024   
   
                      Procalcitonin 1.226 ng/mL High       0.500-1.000 [facility]  
   
                                        Comment on above:   Performed By: #### 2  
735514, 9358201, 755260054, 11182165,   
1817349, 7354904, 4804705482 ####[facility] (DEFAULT)[address] [place] [address]   
   
                                                    Telemetry Stripson    
   
                                        Telemetry Strips    [id number]  
50  
[id number]  
TGTIFF              Normal                                  [facility]  
   
                                                    .Auto Diff 1on 2024   
   
                      Auto Mono % 10 %       Normal     12       [facility]  
   
                                        Comment on above:   Performed By: #### 5  
[id number], 7228303, 6117714, 8672060,   
0644758159, 2237674, 67705459 ####[facility] (DEFAULT)[address] [place] [address]   
   
                      Baso Abs#  0.0 x10    Normal     0.0-0.2    [facility]  
   
                                        Comment on above:   Performed By: #### 5  
596224451, 6665778, 9401901, 5243494,   
2677381555, 0410290, 83022927 ####[facility] (DEFAULT)[address] [place] [address]   
   
                                                    Basophils/100 WBC   
(Bld)           0.5 %           Normal          0.2-2.0         [facility]  
   
                                        Comment on above:   Performed By: #### 5  
475485401, 6922854, 1799647, 8673231,   
0805870799, 8920468, 21669443 ####[facility] (DEFAULT)[address] [place] [address]   
   
                      Eos Abs#   0.0 x10    Normal     0.0-0.4    [facility]  
   
                                        Comment on above:   Performed By: #### 5  
652406374, 4448559, 3202774, 6938213,   
5991000002, 5472692, 89096416 ####[facility] (DEFAULT)[address] [place] [address]   
   
                                                    Eosinophils/100 WBC   
(Bld)           0.0 %           Low             0.9-4.0         [facility]  
   
                                        Comment on above:   Performed By: #### 5  
530041129, 2890937, 6939780, 5836312,   
3455405541, 9361987, 98068483 ####[facility] (DEFAULT)[address] [place] [address]   
   
                      Lymph Abs# 0.5 x10    Low        1.3-2.9    [facility]  
   
                                        Comment on above:   Performed By: #### 5  
121623460, 0393731, 6105026, 2113848,   
9922693333, 4239147, 46111853 ####[facility] (DEFAULT)[address] [place] [address]   
   
                                                    Lymphocytes/100 WBC   
(Bld)           7 %             Low             14-48           [facility]  
   
                                        Comment on above:   Performed By: #### 5  
745724578, 8427190, 7412485, 4818426,   
7214150520, 3013150, 98295575 ####[facility] (DEFAULT)[address] [place] [address]   
   
                      Mono Abs#  0.8 x10    Normal     0.0-0.8    [facility]  
   
                                        Comment on above:   Performed By: #### 5  
785860645, 5088395, 0794948, 8777505,   
1363362077, 8849849, 16328108 ####[facility] (DEFAULT)[address] [place] [address]   
   
                      Neut Abs#  6.8 x10    Normal     1.5-9.2    [facility]  
   
                                        Comment on above:   Performed By: #### 5  
023554823, 5974592, 8292900, 4159560,   
0914299631, 5081436, 78516525 ####[facility] (DEFAULT)[address] [place] [address]   
   
                                                    Neutrophils/100 WBC   
(Bld)           83 %            Normal          44-88           [facility]  
   
                                        Comment on above:   Performed By: #### 5  
826451542, 4362349, 7793616, 4979968,   
5409364249, 5298717, 78066637 ####[facility] (DEFAULT)[address] [place] [address]   
   
                                                    CBC w/ Auto Diffon   
4   
   
                                                    Erythrocyte   
distribution width   
(RBC) [Ratio]   15.6 %          High            11.5-15.0       [facility]  
   
                                        Comment on above:   Performed By: #### 5  
708368973, 5346407, 6651805, 0309604,   
911935, 9611048, 44193315 ####[facility] (DEFAULT)[address] [place] [address]   
   
                                                    Hematocrit (Bld)   
[Volume fraction] 38.2 %          Normal          34.8-51.9       [facility]  
   
                                        Comment on above:   Performed By: #### 5  
060852694, 9774755, 4782369, 5458610,   
0561496139, 5283312, 33199102 ####[facility] (DEFAULT)[address] [place] [address]   
   
                                                    Hemoglobin (Bld)   
[Mass/Vol]      12.8 g/dL       Normal          11.8-17.7       [facility]  
   
                                        Comment on above:   Performed By: #### 5  
240125164, 6029979, 1467830, 8836675,   
2858897942, 7973921, 30827410 ####[facility] (DEFAULT)[address] [place] [address]   
   
                                Man Diff?       Auto            Invalid   
Interpretation   
Code                                                [facility]  
   
                                        Comment on above:   Performed By: #### 5  
003636260, 1659587, 3346785, 1857192,   
7735681401, 5822842, 01082291 ####[facility] (DEFAULT)[address] [place] [address]   
   
                                                    MCH (RBC) [Entitic   
mass]           30 pg           Normal          24-34           [facility]  
   
                                        Comment on above:   Performed By: #### 5  
378687128, 7144945, 4066356, 5823422,   
4021019078, 3030076, 61512392 ####[facility] (DEFAULT)[address] [place] [address]   
   
                                                    MCHC (RBC)   
[Mass/Vol]      34 g/dL         Normal          26-37           [facility]  
   
                                        Comment on above:   Performed By: #### 5  
907726594, 3255050, 5301774, 0240106,   
9485842654, 1395761, 23323399 ####[facility] (DEFAULT)[address] [place] [address]   
   
                                                    MCV (RBC) [Entitic   
vol]            88 fL           Normal                    [facility]  
   
                                        Comment on above:   Performed By: #### 5  
339792974, 0313407, 3743077, 3780890,   
6665061645, 9670855, 57624779 ####[facility] (DEFAULT)[address] [place] [address]   
   
                      Platelet   132 x10    Low        138-427    [facility]  
   
                                        Comment on above:   Performed By: #### 5  
418241548, 4832780, 1954503, 1056383,   
9869167594, 2635190, 55089710 ####[facility] (DEFAULT)[address] [place] [address]   
   
                                                    Platelet mean volume   
(Bld) [Entitic vol] 8.7 fL          Normal          6.3-10.2        [facility]  
   
                                        Comment on above:   Performed By: #### 5  
127976162, 3934691, 2304381, 9586291,   
3606498250, 3834130, 89312984 ####[facility] (DEFAULT)[address] [place] [address]   
   
                      RBC        4.32 x10   Normal     3.70-5.30  [facility]  
   
                                        Comment on above:   Performed By: #### 5  
617252368, 1216505, 7566000, 0595437,   
7222075797, 3295598, 70833651 ####[facility] (DEFAULT)[address] [place] [address]   
   
                      WBC        8.2 x10    Normal     3.5-10.5   [facility]  
   
                                        Comment on above:   Performed By: #### 5  
757810981, 5963883, 5347850, 7589227,   
8246088132, 8509511, 51393820 ####[facility] (DEFAULT)[address] [place] [address]   
   
                                                    CKon 2024   
   
                                                    CK [Catalytic   
activity/Vol]   2963 U/L        High                      [facility]  
   
                                        Comment on above:   Performed By: #### 5  
650916853, 5359778, 2516056, 0730079,   
3828513524, 5625772, 19276401 ####[facility] (DEFAULT)[address] [place] [address]   
   
                                                    CMP Standardon 2024   
   
                                eGFR Non AA     44 mL/min/1.73m2 Invalid   
Interpretation   
Code                                                [facility]  
   
                                        Comment on above:   Performed By: #### 5  
266227643, 0330293, 1688203, 3325492,   
2174542068, 4172802, 41417747 ####[facility] (DEFAULT)[address] [place] [address]   
   
                                eGFR AA         54 mL/min/1.73m2 Invalid   
Interpretation   
Code                                                [facility]  
   
                                        Comment on above:   Performed By: #### 5  
664739067, 7279471, 2280368, 0525690,   
0458372989, 6657915, 52602219 ####[facility] (DEFAULT)[address] [place] [address]   
   
                      Albumin [Mass/Vol] 3.0 g/dL   Low        3.5-5.0    [facility]  
   
                                        Comment on above:   Performed By: #### 5  
099872259, 7125349, 2412812, 7903432,   
4980850437, 9400005, 48383026 ####[facility] (DEFAULT)[address] [place] [address]   
   
                                                    Albumin/Globulin   
[Mass ratio]    1.1 {ratio}     Low             1.4-2.6         [facility]  
   
                                        Comment on above:   Performed By: #### 5  
970888922, 4741325, 2563876, 3215886,   
6742328378, 0450265, 53245228 ####[facility] (DEFAULT)[address] [place] [address]   
   
                      Alk Phos   60 IU/L    Normal     32-91      [facility]  
   
                                        Comment on above:   Performed By: #### 5  
532823319, 7264539, 3348034, 7095760,   
6989356980, 7088548, 81474484 ####[facility] (DEFAULT)[address] [place] [address]   
   
                                                    ALT [Catalytic   
activity/Vol]   50.0 U/L        Normal          17.0-63.0       [facility]  
   
                                        Comment on above:   Performed By: #### 5  
950260371, 6220836, 5022794, 7195373,   
5151425374, 7660087, 72205067 ####[facility] (DEFAULT)[address] [place] [address]   
   
                                                    Anion gap   
[Moles/Vol]     7.6 mmol/L      Normal          5.0-19.0        [facility]  
   
                                        Comment on above:   Performed By: #### 5  
137971242, 1218135, 9304099, 5453289,   
2271698520, 1036297, 28941473 ####[facility] (DEFAULT)[address] [place] [address]   
   
                                                    AST [Catalytic   
activity/Vol]   158 U/L         High            15-41           [facility]  
   
                                        Comment on above:   Performed By: #### 5  
819346901, 7626504, 4944411, 1921284,   
8549463367, 8993990, 01334156 ####[facility] (DEFAULT)[address] [place] [address]   
   
                      Bili Total 0.9 mg/dL  Normal     0.3-1.2    [facility]  
   
                                        Comment on above:   Performed By: #### 5  
464137802, 7508959, 7256740, 7016407,   
5803787242, 8979944, 26773958 ####[facility] (DEFAULT)[address] [place] [address]   
   
                      Calcium [Mass/Vol] 7.8 mg/dL  Low        8.9-10.3   [facility]  
   
                                        Comment on above:   Performed By: #### 5  
548798894, 3119658, 5521720, 4963031,   
5584606291, 8591125, 22524615 ####[facility] (DEFAULT)[address] [place] [address]   
   
                      Chloride [Moles/Vol] 111 mmol/L Normal     101-111    [facility]  
   
                                        Comment on above:   Performed By: #### 5  
122973447, 1293234, 0858988, 2138061,   
1286691285, 2618830, 92767395 ####[facility] (DEFAULT)[address] [place] [address]   
   
                      CO2 [Moles/Vol] 22 mmol/L  Normal     21-32      [facility]  
   
                                        Comment on above:   Performed By: #### 5  
062749908, 6105681, 9425037, 1462745,   
6279434531, 8727855, 22124432 ####[facility] (DEFAULT)[address] [place] [address]   
   
                                                    Creatinine   
[Mass/Vol]      1.50 mg/dL      High            0.90-1.30       [facility]  
   
                                        Comment on above:   Performed By: #### 5  
526994009, 3403112, 1341408, 9227441,   
6062074542, 5876227, 67821121 ####[facility] (DEFAULT)[address] [place] [address]   
   
                                                    Globulin (S)   
[Mass/Vol]      2.6 g/dL        Normal          1.5-4.3         [facility]  
   
                                        Comment on above:   Performed By: #### 5  
633055940, 9695317, 7279420, 6679273,   
3493797162, 4629035, 02015128 ####[facility] (DEFAULT)[address] [place] [address]   
   
                      Glucose [Mass/Vol] 128.0 mg/dL High       74.0-118.0 [facility]  
   
                                        Comment on above:   Performed By: #### 5  
945945566, 2828139, 1548161, 7798972,   
4088533684, 6353715, 30783335 ####[facility] (DEFAULT)[address] [place] [address]   
   
                                Osmolality      283 mOsm/L      Invalid   
Interpretation   
Code                                                [facility]  
   
                                        Comment on above:   Performed By: #### 5  
054768074, 2136701, 3215327, 7881319,   
5208206474, 3457082, 07715500 ####[facility] (DEFAULT)[address] [place] [address]   
   
                                                    Potassium   
[Moles/Vol]     3.6 mmol/L      Normal          3.6-5.1         [facility]  
   
                                        Comment on above:   Performed By: #### 5  
465897666, 9846379, 3018714, 2201093,   
6736444851, 0712246, 36599518 ####[facility] (DEFAULT)[address] [place] [address]   
   
                      Protein [Mass/Vol] 5.6 g/dL   Low        6.5-8.1    [facility] [facility]  
   
                                        Comment on above:   Performed By: #### 5  
[id number], 1839844, 8849835, 1601092,   
4780029852, 3411684, 04115022 ####[facility] (DEFAULT)[address] [place] [address]   
   
                      Sodium [Moles/Vol] 137.0 mmol/L Normal     136.0-144.0 [facility]  
   
                                        Comment on above:   Performed By: #### 5  
[id number], 6715263, 8793193, 2011512,   
9275653936, 8479726, 16300380 ####[facility] (DEFAULT)[address] [place] [address]   
   
                                                    Urea nitrogen   
[Mass/Vol]      33 mg/dL        High            8-26            [facility]  
   
                                        Comment on above:   Performed By: #### 5  
[id number], 0667901, 3285424, 6809908,   
6428410408, 2931828, 71638647 ####[facility] (DEFAULT)[address] [place] [address]   
   
                                                    Urea   
nitrogen/Creatinine   
[Mass ratio]    22.0 mg/mg      High            4.6-16.2        [facility]  
   
                                        Comment on above:   Performed By: #### 5  
[id number], 9507970, 6540291, 0159606,   
9430390949, 8993774, 82036648 ####[facility] (DEFAULT)[address] [place] [address] 227[address]   
   
                                                    Magnesiumon 2024   
   
                      Magnesium [Mass/Vol] 1.80 mg/dL Normal     1.80-2.50  [facility]  
   
                                        Comment on above:   Performed By: #### 5  
[id number], 3165172, 3726569, 8636170,   
1662094023, 1281784, 70454281 ####[facility] (DEFAULT)[address] [place] [address] 108[address]   
   
                                                    Myoglobinon 2024   
   
                      Myoglobin [Mass/Vol] 3674.6 ng/mL High       17.4-105.7 [facility]  
   
                                        Comment on above:   Performed By: #### 5  
[id number], 3591609, 6784589, 9455765,   
7173765919, 9841536, 72438452 ####[facility] (DEFAULT)[address]   
[place] [address]   
   
                                                    Nutrition Noteon 2024   
   
                      Nutrition Note            Normal                [facility]  
   
                                                    Pharmacy Noteon 2024   
   
                      Pharmacy Note            Normal                [facility]  
   
                                                    Telemetry Stripson    
   
                                        Telemetry Strips    [id number]  
40  
[id number]#1.00O  
TGTIFF              Normal                                  [facility]  
   
                                                    TnI HSon 2024   
   
                                                    Troponin I High   
Sensitivity               466.0 pg/mL               Critically   
abnormal                  <=20.0                    [facility]  
   
                                        Comment on above:   Result Comment: Crit  
ical result TNIHS 466.0 pg/mL called to   
and   
read back by caprice colmenares at 06-Mar-2024 05:14 by roman.   
   
                                                            Performed By: #### 5  
[id number], 3142060, 1909294, 6267883,   
6330057999, 8994129, 57631934 ####[facility] (DEFAULT)[address] [place] [address]   
   
                                                    .Auto Diff 12024   
   
                      Auto Mono % 9 %        Normal     -12       [facility]  
   
                                        Comment on above:   Performed By: #### 2  
633180, 01837120, 7845387245, 1295286,   
7017713, 7490976, 126729590, 3360328579, 4719015, 7613333656,   
[id number] ####[facility] (DEFAULT)[address] [place] [address]   
   
                      Baso Abs#  0.0 x10    Normal     0.0-0.2    [facility]  
   
                                        Comment on above:   Performed By: #### 2  
633876, 66566697, 8834713785, 6955568,   
4435862, 9202794, 371983317, 5035707261, 4151083, 5747227992,   
[id number] ####[facility] (DEFAULT)[address] [place] [address]   
   
                                                    Basophils/100 WBC   
(Bld)           0.1 %           Low             0.2-2.0         [facility]  
   
                                        Comment on above:   Performed By: #### 2  
696715, 10657264, 6118916931, 8257746,   
8325881, 7058749, 098720375, 7069732931, 9324048, 4447969387,   
[id number] ####[facility] (DEFAULT)[address] [place] [address]   
   
                      Eos Abs#   0.0 x10    Normal     0.0-0.4    [facility]  
   
                                        Comment on above:   Performed By: #### 2  
629031, 51654833, 2800407793, 0104553,   
2474344, 4206911, 279946634, 1140966100, 0215775, 0399541583,   
[id number] ####[facility] (DEFAULT)[address] [place] [address]   
   
                                                    Eosinophils/100 WBC   
(Bld)           0.0 %           Low             0.9-4.0         [facility]  
   
                                        Comment on above:   Performed By: #### 2  
113515, 32184238, 1238834511, 2486269,   
5751493, 7670992, 736292565, 8313107241, 7602454, 7101730343,   
[id number] ####[facility] (DEFAULT)[address] [place] [address]   
   
                      Lymph Abs# 0.3 x10    Low        1.3-2.9    [facility]  
   
                                        Comment on above:   Performed By: #### 2  
195295, 27156000, 0492981344, 2617512,   
2736089, 6961119, 272626677, 5335197254, 7418357, 5380370394,   
[id number] ####[facility] (DEFAULT)[address] [place] [address]   
   
                                                    Lymphocytes/100 WBC   
(Bld)           3 %             Low             14-48           [facility]  
   
                                        Comment on above:   Performed By: #### 2  
688499, 11163004, 0861610923, 4956278,   
4709439, 4649718, 116134104, 6850784135, 8441324, 1796012492,   
[id number] ####[facility] (DEFAULT)[address] [place] [address]   
   
                      Mono Abs#  1.0 x10    High       0.0-0.8    [facility]  
   
                                        Comment on above:   Performed By: #### 2  
283623, 65671517, 8207399301, 6062184,   
1727628, 6162981, 268983113, 7845857906, 9778251, 3264471836,   
[id number] ####[facility] (DEFAULT)[address] [place] [address]   
   
                      Neut Abs#  9.5 x10    High       1.5-9.2    [facility]  
   
                                        Comment on above:   Performed By: #### 2  
593038, 12780063, 3745021715, 3201645,   
5038346, 9430789, 707455514, 9928338207, 5846319, 5167548189,   
[id number] ####[facility] (DEFAULT)[address] [place] [address]   
   
                                                    Neutrophils/100 WBC   
(Bld)           88 %            Normal          44-88           [facility]  
   
                                        Comment on above:   Performed By: #### 2  
315551, 05627629, 5482599216, 7734173,   
1453853, 1946895, 627430646, 5478139492, 0461548, 8920225566,   
[id number] ####[facility] (DEFAULT)[address] [place] [address]   
   
                                                    .QC SARS-CoV-2 (COVID-19)/Fl  
u/RSV ([facility])on 2024   
   
                      Internal Control Pass       Normal                [facility]  
   
                                        Comment on above:   Order Comment: Order  
ed by Discern.[[email or web address]]   
   
                                                            Performed By: #### 7  
[id number], [id number] ####[facility]   
(DEFAULT)[address] [place] [address]   
   
                                                    CBC w/ Auto Diffon    
   
                                                    Erythrocyte   
distribution width   
(RBC) [Ratio]   15.2 %          High            11.5-15.0       [facility]  
   
                                        Comment on above:   Performed By: #### 2  
678850, 65847394, 4775013274, 7970978,   
6698925, 1764671, 442500794, 9932622685, 2787996, 3215683432,   
[id number] ####[facility] (DEFAULT)[address] [place] [address]   
   
                                                    Hematocrit (Bld)   
[Volume fraction] 44.0 %          Normal          34.8-51.9       [facility]  
   
                                        Comment on above:   Performed By: #### 2  
864675, 39363925, 6138401212, 6501017,   
3163353, 6091853, 213539844, 3280087487, 6928907, 8027134324,   
[id number] ####[facility] (DEFAULT)[address] [place] [address]   
   
                                                    Hemoglobin (Bld)   
[Mass/Vol]      14.7 g/dL       Normal          11.8-17.7       [facility]  
   
                                        Comment on above:   Performed By: #### 2  
083696, 42932788, 8179721180, 9707560,   
9913882, 6775893, 401739128, 2031100579, 0615848, 3131442003,   
[id number] ####[facility] (DEFAULT)[address] [place] [address]   
   
                                Man Diff?       Auto            Invalid   
Interpretation   
Code                                                [facility]  
   
                                        Comment on above:   Performed By: #### 2  
534601, 24228613, 0648347248, 0375392,   
2723921, 6246828, 922988364, 4632692519, 9323001, 9062850559,   
[id number] ####[facility] (DEFAULT)[address] [place] [address]   
   
                                                    MCH (RBC) [Entitic   
mass]           30 pg           Normal          24-34           [facility]  
   
                                        Comment on above:   Performed By: #### 2  
710171, 61535492, 1107068003, 3267721,   
2808190, 3697016, 556427999, 4704730025, 7586797, 4409361450,   
[id number] ####[facility] (DEFAULT)[address] [place], [place] [address]   
   
                                                    MCHC (RBC)   
[Mass/Vol]      33 g/dL         Normal          26-37           [facility]  
   
                                        Comment on above:   Performed By: #### 2  
140919, 54402181, 2430759570, 5534372,   
1977581, 7213826, 918616833, 0485765487, 0090364, 3826407820,   
[id number] ####[facility] (DEFAULT)[address] [place] [address]   
   
                                                    MCV (RBC) [Entitic   
vol]            89 fL           Normal                    [facility]  
   
                                        Comment on above:   Performed By: #### 2  
760324, 13540422, 7029037056, 8207636,   
0250546, 1896691, 090778538, 2998813562, 8904492, 6178801247,   
[id number] ####[facility] (DEFAULT)[address] [place] [address]   
   
                      Platelet   173 x10    Normal     138-427    [facility]  
   
                                        Comment on above:   Performed By: #### 2  
770123, 24269835, 9740065957, 5454041,   
0197336, 1340301, 970964791, 3003942605, 4316601, 0540065107,   
[id number] ####[facility] (DEFAULT)[address] [place] [address]   
   
                                                    Platelet mean volume   
(Bld) [Entitic vol] 7.9 fL          Normal          6.3-10.2        [facility]  
   
                                        Comment on above:   Performed By: #### 2  
906913, 11917117, 6720996175, 3379347,   
2161502, 3628229, 623130884, 3380117144, 0794873, 4703069879,   
[id number] ####[facility] (DEFAULT)[address] [place] [address]   
   
                      RBC        4.95 x10   Normal     3.70-5.30  [facility]  
   
                                        Comment on above:   Performed By: #### 2  
887327, 09110121, 1282949111, 6127650,   
9582947, 4472206, 643869487, 3206304238, 9572440, 8359032520,   
[id number] ####[facility] (DEFAULT)[address] [place] [address]   
   
                      WBC        10.7 x10   High       3.5-10.5   [facility]  
   
                                        Comment on above:   Performed By: #### 2  
414167, 32836807, 8143895155, 2310254,   
5347257, 0676931, 193980665, 0475969010, 3623659, 6745778003,   
[id number] ####[facility] (DEFAULT)[address] [place], [place] [address]n 2024   
   
                      Total CK   >4100      High            [facility]  
   
                                        Comment on above:   Result Comment: Veri  
fied by dilution   
   
                                                            Performed By: #### 2  
626895, 02761703, 2682677170, 1880160,   
4430677, 5714145, 968381058, 3483863730, 6102928, 0488520907,   
[id number] ####[facility] (DEFAULT)[address] [place], [place] [address]   
   
                                                    [facility] Standardon 2024   
   
                                eGFR Non AA     49 mL/min/1.73m2 Invalid   
Interpretation   
Code                                                [facility]  
   
                                        Comment on above:   Performed By: #### 2  
212556, 98398241, 1783291859, 5458367,   
7100659, 3155369, 566909308, 2445515501, 2203581, 5854378323,   
[id number] ####[facility] (DEFAULT)[address] [place] [address]   
   
                                eGFR AA         59 mL/min/1.73m2 Invalid   
Interpretation   
Code                                                [facility]  
   
                                        Comment on above:   Performed By: #### 2  
633385, 13773114, 8221276767, 1111837,   
7223710, 3016172, 311548723, 7513568547, 7537825, 8281866146,   
[id number] ####[facility] (DEFAULT)[address] [place] [address]   
   
                      Albumin [Mass/Vol] 4.2 g/dL   Normal     3.5-5.0    [facility]  
   
                                        Comment on above:   Performed By: #### 2  
549129, 17374510, 9975555091, 8735030,   
7997156, 8886346, 957961576, 9061820514, 9702155, 9057017937,   
[id number] ####[facility] (DEFAULT)[address] [place] [address]   
   
                      Alk Phos   81 IU/L    Normal     32-91      [facility]  
   
                                        Comment on above:   Performed By: #### 2  
807645, 25587463, 6930325910, 8292375,   
7509192, 7657875, 731093750, 8945527317, 3198797, 5407937376,   
[id number] ####[facility] (DEFAULT)[address] [place] [address]   
   
                                                    ALT [Catalytic   
activity/Vol]   61.0 U/L        Normal          17.0-63.0       [facility]  
   
                                        Comment on above:   Performed By: #### 2  
231268, 06878079, 9381650862, 3799962,   
0798203, 2031924, 059646400, 4192192124, 1435965, 1216804530,   
[id number] ####[facility] (DEFAULT)[address] [place] [address] 160[address]   
   
                                                    AST [Catalytic   
activity/Vol]   150 U/L         High            15-41           [facility]  
   
                                        Comment on above:   Performed By: #### 2  
651028, 76792846, 3129882836, 9872303,   
0484366, 9757916, 694585320, 7243486796, 1555048, 0052455509,   
[id number] ####[facility] (DEFAULT)[address] [place] [address]   
   
                      Bili Total 1.1 mg/dL  Normal     0.3-1.2    [facility]  
   
                                        Comment on above:   Performed By: #### 2  
233061, 80745771, 4108655474, 6090172,   
5239978, 8080763, 896396611, 8237869062, 7733991, 1155644850,   
[id number] ####[facility] (DEFAULT)[address] [place] [address] 078[address]   
   
                      Calcium [Mass/Vol] 8.8 mg/dL  Low        8.9-10.3   [facility]  
   
                                        Comment on above:   Performed By: #### 2  
361250, 08346495, 4491821418, 6684213,   
1306787, 4013301, 968124180, 3397137761, 3491148, 6635656406,   
[id number] ####[facility] (DEFAULT)[address] [place] [address] 10238   
   
                      Chloride [Moles/Vol] 105 mmol/L Normal     101-111    [facility]  
   
                                        Comment on above:   Performed By: #### 2  
633080, 49690740, 7784850007, 6189222,   
6244885, 1358934, 097907428, 5283945542, 9564088, 3915423317,   
[id number] ####[facility] (DEFAULT)[address] [place] [address]   
   
                      CO2 [Moles/Vol] 26 mmol/L  Normal     21-32      [facility]  
   
                                        Comment on above:   Performed By: #### 2  
935429, 05262619, 7564869309, 2110458,   
3753738, 0164513, 486115981, 8865875399, 2598226, 1126204501,   
[id number] ####[facility] (DEFAULT)[address] [place] [address]   
   
                                                    Creatinine   
[Mass/Vol]      1.37 mg/dL      High            0.90-1.30       [facility]  
   
                                        Comment on above:   Performed By: #### 2  
543771, 87338969, 9521039902, 8619360,   
8828158, 0805070, 290477138, 4583420513, 3864556, 0893004704,   
[id number] ####[facility] (DEFAULT)[address] [place] [address]   
   
                      Glucose [Mass/Vol] 124.0 mg/dL High       74.0-118.0 [facility]  
   
                                        Comment on above:   Performed By: #### 2  
989400, 70236468, 8621983505, 1370778,   
3685791, 6743939, 378535546, 8289287260, 8864014, 8842434348,   
[id number] ####[facility] (DEFAULT)[address] [place] [address]   
   
                                                    Potassium   
[Moles/Vol]     3.8 mmol/L      Normal          3.6-5.1         [facility]  
   
                                        Comment on above:   Performed By: #### 2  
476871, 69479003, 2697112209, 4588078,   
7418546, 6222161, 347638146, 9157970563, 5876369, 8929252042,   
[id number] ####[facility] (DEFAULT)[address] [place] [address] 11847   
   
                      Protein [Mass/Vol] 7.4 g/dL   Normal     6.5-8.1    [facility]  
   
                                        Comment on above:   Performed By: #### 2  
349985, 93065240, 7423169168, 2228443,   
7185099, 1449711, 205526387, 5420939553, 3631085, 5965853900,   
[id number] ####[facility] (DEFAULT)[address] [place] [address]   
   
                      Sodium [Moles/Vol] 140.0 mmol/L Normal     136.0-144.0 [facility]  
   
                                        Comment on above:   Performed By: #### 2  
194292, 82195029, 6827755949, 2822664,   
9608727, 7223647, 905235329, 2346231540, 6452644, 6081510735,   
[id number] ####[facility] (DEFAULT)[address] [place] [address]   
   
                                                    Urea nitrogen   
[Mass/Vol]      31 mg/dL        High            8-26            [facility]  
   
                                        Comment on above:   Performed By: #### 2  
748994, 54984980, 6804754063, 3526861,   
1512917, 4275091, 380959119, 0545791775, 6031824, 0048110362,   
[id number] ####[facility] (DEFAULT)[address] [place] [address]16[address]   
   
                                                    Albumin/Globulin   
[Mass ratio]    1.3 {ratio}     Low             1.4-2.6         [facility]  
   
                                        Comment on above:   Performed By: #### 2  
438293, 76984201, 3646795396, 5024839,   
9683002, 6945318, 977376702, 0852778483, 0608767, 0122926743,   
[id number] ####[facility] (DEFAULT)[address] [place] [address]   
   
                                                    Anion gap   
[Moles/Vol]     12.8 mmol/L     Normal          5.0-19.0        [facility]  
   
                                        Comment on above:   Performed By: #### 2  
530661, 11956491, 5504892287, 8570034,   
2331726, 5096088, 226661448, 5467538627, 8933738, 9490385394,   
[id number] ####[facility] (DEFAULT)[address] [place] [address]   
   
                                                    Globulin (S)   
[Mass/Vol]      3.2 g/dL        Normal          1.5-4.3         [facility]  
   
                                        Comment on above:   Performed By: #### 2  
134980, 53673009, 2003558301, 0701163,   
6948013, 5216356, 696998781, 8585767374, 2573992, 1120616359,   
[id number] ####[facility] (DEFAULT)[address] [place] [address]   
   
                                Osmolality      287 mOsm/L      Invalid   
Interpretation   
Code                                                [facility]  
   
                                        Comment on above:   Performed By: #### 2  
044358, 50235480, 3258596644, 6558508,   
1597541, 4070181, 178513622, 4616057155, 6165565, 4203156689,   
[id number] ####[facility] (DEFAULT)[address] [place] [address]   
   
                                                    Urea   
nitrogen/Creatinine   
[Mass ratio]    22.6 mg/mg      High            4.6-16.2        [facility]  
   
                                        Comment on above:   Performed By: #### 2  
176115, 84253292, 3462825252, 9762059,   
6089585, 6010424, 349998892, 6164325285, 1053370, 8326051428,   
[id number] ####[facility] (DEFAULT)[address] [place] [address]   
   
                                                    COVID/Flu/RSV ([facility])on   
2024   
   
                                                    Flu A ([facility]   
COVFLURSV)      Positive        Normal          Negative        [facility]  
   
                                        Comment on above:   Performed By: #### 7  
[id number], [id number] ####[facility]   
(DEFAULT)[address] [place] [address]   
   
                                                    Flu B (GXpert   
COVFLURSV)      Negative        Normal          Negative        [facility]  
   
                                        Comment on above:   Performed By: #### 7  
[id number], [id number] ####[facility]   
(DEFAULT)[address] [place] [address]   
   
                                                    RSV (GXpert   
COVFLURSV)      Negative        Normal          Negative        [facility]  
   
                                        Comment on above:   Performed By: #### 7  
[id number], [id number] ####[facility]   
(DEFAULT)[address] [place] [address]   
   
                                                    SARS-CoV-2   
(COVID-19) RNA   
MIRIAM+probe Ql (Unsp   
spec)           Negative        Normal          Negative        [facility]  
   
                                        Comment on above:   Result Comment: Perf  
ormed by PCR methodology.   
   
                                                            Performed By: #### 7  
[id number], [id number] ####[facility]   
(DEFAULT)[address] [place] [address]   
   
                                                    CRPon 2024   
   
                      CRP        8.3 mg/dL  High       <=0.5      [facility]  
   
                                        Comment on above:   Performed By: #### 2  
[id number], [id number], [id number], 5292772,   
7566241, 6520188, [id number], [id number], 7893876, [id number],   
[id number] ####[facility] (DEFAULT)[address] [place] [address]   
   
                                                    CT Head or Brain w/o Contras  
ton 2024   
   
                                                    CT Head or Brain w/o   
Contrast                        Normal                          [facility]  
   
                                                    CT Maxillofacial w/o Contras  
ton 2024   
   
                                                    CT Maxillofacial w/o   
Contrast                        Normal                          [facility]  
   
                                                    CT Spine Cervical w/o Contra  
ston 2024   
   
                                                    CT Spine Cervical   
w/o Contrast                    Normal                          [facility]  
   
                                                    ED Clinical Summaryon 2024   
   
                      ED Clinical Summary            [facility]  
   
                                                    ED Note - Physicianon 2024   
   
                      ED Note - Physician            [facility]  
   
                                                    ED Note-Nursingon 2024  
   
   
                                        ED Note-Nursing     Patient admitted to   
the floor, hospitalist   
to review cultures.  
Electronically Signed   
by: Lindsey Montgomery on   
2024 16:06 EST [facility]  
   
                                        ED Note-Nursing     16fr alvarado catheter   
inserted without   
difficulty. 10ml of   
cloudy, thick,   
sediment urine came   
through the catheter,   
no further urine   
output at this time  
Electronically Signed   
by: Tamika Handy   
on 2024 11:48   
EST                 [facility]  
   
                                        ED Note-Nursing     Dr. ANGELIC mckeon.  
Electronically Signed   
by: Ya Parson   
on 2024 10:58   
EST                 [facility]  
   
                                        ED Note-Nursing     Dr. Rand notifi  
ed   
that repeat CK was   
needed per lab. Lab   
states that CK at this   
time was reading >   
4100. REsults pending   
30 minutes.  
Electronically Signed   
by: Ya Parson   
on 2024 10:16   
EST                 [facility]  
   
                      ED Note-Nursing            [facility]  
   
                                                    ED Patient Education Noteon   
2024   
   
                                                    ED Patient Education   
Note            Education Materials [facility]  
   
                                                    ED Patient Summaryon   
024   
   
                      ED Patient Summary            [facility]  
   
                                                    Extra Redon 2024   
   
                                Tube Collected  Yes             Invalid   
Interpretation   
[facility]  
   
                                        Comment on above:   Performed By: #### 2  
140006, 78094552, [id number], 1738813,   
6257689, 5585371, [id number], [id number], 0494331, [id number],   
[id number] ####[facility] (DEFAULT)[address] [place] [address]   
   
                                                    Myoglobinon 2024   
   
                      Myoglobin [Mass/Vol] ng/mL      High       17.4-105.7 [facility]  
   
                                        Comment on above:   Performed By: #### 2  
982779, 39815178, [id number], 5782140,   
5507002, 7702596, 288832428, 7094183833, 9067703, [id number],   
[id number] ####[facility] (DEFAULT)[address] [place] [address]   
   
                                                    Procalcitoninon 2024   
   
                      Procalcitonin 1.776 ng/mL High       0.500-1.000 [facility]  
   
                                        Comment on above:   Performed By: #### 7  
[id number] ####[facility]   
(DEFAULT)[address] [place] [address]   
   
                                                    TSH w/ Reflex to FT4on    
   
                      TSH Qn     1.83 m[IU]/L Normal     0.45-5.33  [facility]  
   
                                        Comment on above:   Performed By: #### 2  
127224, 50376141, [id number], 0669416,   
0022891, 9494764, 362300841, 1233445207, 3618486, [id number],   
[id number] ####[facility] (DEFAULT)[address] [place] [address]   
   
                                                    TnI HSon 2024   
   
                                                    Troponin I High   
Sensitivity               594.1 pg/mL               Critically   
abnormal                  <=20.0                    [facility]  
   
                                        Comment on above:   Result Comment: Crit  
ical result TNIHS 594.1 pg/mL called to   
and   
read back by Nabila Terrell at 05-Mar-2024 23:08 by Betsy.   
   
                                                            Performed By: #### 5  
[id number] ####[facility] (DEFAULT)[address] [place] [address]   
   
                                                    Troponin I High   
Sensitivity               610.4 pg/mL               Critically   
abnormal                  <=20.0                    [facility]  
   
                                        Comment on above:   Result Comment: Crit  
ical result TNIHS 610.4 pg/mL called to   
and   
read back by Adalid Wheeler at 05-Mar-2024 15:44 by Betsy.   
   
                                                            Performed By: #### 5  
[id number] ####[facility] (DEFAULT)[address] [place] [address]   
   
                                                    Troponin I High   
Sensitivity               485.2 pg/mL               Critically   
abnormal                  <=20.0                    [facility]  
   
                                        Comment on above:   Result Comment: Crit  
ical result TNIHS 485.2 pg/mL called to   
and   
read back by Migdalia Parson RN ER at 05-Mar-2024 10:03 by   
[email or web address].   
   
                                                            Performed By: #### 2  
676519, 44758367, [id number], 8364813,   
4278843, 5779239, 933875518, 5847984777, 3348838, [id number],   
[id number] ####[facility] (DEFAULT)[address] [place] [address]   
   
                                                    UA Hinlk0ju 2024   
   
                      UA Amorph. Few        [facility]  
   
                                        Comment on above:   Order Comment: Urina  
lysis Microscopic order added on by   
OOTU   
Expert Rules system.   
   
                                                            Performed By: #### 1  
[id number], 2179913, [id number] ####[facility] (DEFAULT)[address] [place] [address]   
   
                      UA Bacteria 3+         [facility]  
   
                                        Comment on above:   Order Comment: Urina  
lysis Microscopic order added on by   
OOTU   
Expert Rules system.   
   
                                                            Performed By: #### 1  
[id number], 7314749, [id number] ####[facility] (DEFAULT)[address] [place] [address]   
   
                      UA RBC     5-10       [facility]  
   
                                        Comment on above:   Order Comment: Urina  
lysis Microscopic order added on by   
OOTU   
Expert Rules system.   
   
                                                            Performed By: #### 1  
[id number], 1822257, [id number] ####[facility] (DEFAULT)[address] [place] [address]   
   
                      UA Squam Epi Rare       [facility]  
   
                                        Comment on above:   Order Comment: Urina  
lysis Microscopic order added on by   
OOTU   
Expert Rules system.   
   
                                                            Performed By: #### 1  
[id number], 7358152, [id number] ####[facility] (DEFAULT)[address] [place] [address]   
   
                      UA WBC     0-2        [facility]  
   
                                        Comment on above:   Order Comment: Urina  
lysis Microscopic order added on by   
OOTU   
Expert Rules system.   
   
                                                            Performed By: #### 1  
[id number], 0015470, [id number] ####[facility] (DEFAULT)[address] [place] [address]   
   
                                                    UA w Culture if Ind Standard  
on 2024   
   
                      Breakpoint UA ********   [facility]  
   
                                        Comment on above:   Performed By: #### 1  
[id number], 4752409, 24401795 ####[facility] (DEFAULT)[address] [place] [address]   
   
                      Color (U)  Yellow     Normal                [facility]  
   
                                        Comment on above:   Performed By: #### 1  
[id number], 9208278, 85505083 ####[facility] (DEFAULT)[address] [place] [address]   
   
                      Culture?   Yes        Normal                [facility]  
   
                                        Comment on above:   Result Comment: Resu  
lt created by rule GL_MAGR_ADD_UA_CULT1   
Result created by rule GL_MAGR_ADD_UA_CULT1   
   
                                                            Performed By: #### 1  
[id number], 5127249, [id number] ####[facility] (DEFAULT)[address] [place] [address]   
   
                                                    Glucose (U)   
[Mass/Vol]      100 mg/dL       Normal                          [facility]  
   
                                        Comment on above:   Performed By: #### 1  
[id number], 5073292, 95845520 ####[facility] (DEFAULT)[address] [place] [address]   
   
                      Ketones Ql (U) TRACE      Normal                [facility]  
   
                                        Comment on above:   Performed By: #### 1  
[id number], 7185597, 12666814 ####[facility] (DEFAULT)[address] [place] [address]   
   
                                Micro?          Indicated       Invalid   
Interpretation   
Code                                                [facility]  
   
                                        Comment on above:   Result Comment: Resu  
lt created by rule GL_MAGR_ADD_UA_MICRO   
   
                                                            Performed By: #### 1  
[id number], 9931375, 89145142 ####[facility] (DEFAULT)[address] [place] [address]   
   
                      UA Bilirubin Negative   Normal                [facility]  
   
                                        Comment on above:   Performed By: #### 1  
[id number], 3360130, 96340115 ####[facility] (DEFAULT)[address] [place] [address]   
   
                      UA Blood   LARGE      Abnormal   NEGATIVE   [facility]  
   
                                        Comment on above:   Performed By: #### 1  
[id number], 0496576, 46271562 ####[facility] (DEFAULT)[address] [place] [address]   
   
                      UA Clarity SL CLOUDY  Abnormal   CLEAR      [facility]  
   
                                        Comment on above:   Performed By: #### 1  
[id number], 6971082, 45529936 ####[facility] (DEFAULT)[address] [place] [address]   
   
                      UA Leuk Est TRACE      Abnormal   NEGATIVE   [facility]  
   
                                        Comment on above:   Performed By: #### 1  
[id number], 3762789, 64174000 ####[facility] (DEFAULT)[address] [place] [address]   
   
                      UA Nitrite Negative   Normal     NEGATIVE   [facility]  
   
                                        Comment on above:   Performed By: #### 1  
[id number], 0873086, 34708058 ####[facility] (DEFAULT)[address] [place] [address]   
   
                      UA pH      5.5        Normal     5-8        [facility]  
   
                                        Comment on above:   Performed By: #### 1  
[id number], 6616207, 41165326 ####[facility] (DEFAULT)[address] [place] [address]   
   
                      UA Protein 100        Abnormal   NEGATIVE   [facility]  
   
                                        Comment on above:   Performed By: #### 1  
[id number], 1053073, 76548799 ####[facility] (DEFAULT)[address] [place] [address]   
   
                      UA Spec Grav >=1.030    Normal     1.001-1.035 [facility]  
   
                                        Comment on above:   Performed By: #### 1  
[id number], 4075903, 55016629 ####[facility] (DEFAULT)[address] [place] [address]   
   
                      UA Urobilinogen 0.2 mg/dL  Normal     0.2-1.0    [facility]  
   
                                        Comment on above:   Performed By: #### 1  
[id number], 6652780, 82065518 ####[facility] (DEFAULT)[address] [place] [address]   
   
                      Urine Source Clean Catch Normal                [facility]  
   
                                        Comment on above:   Performed By: #### 1  
[id number], 1594800, 85165152 ####[facility] (DEFAULT)[address] [place] [address]   
   
                                                    US Echocardiogram Completeon  
 2024   
   
                                                    US Echocardiogram   
Complete                        Normal                          [facility]  
   
                                                    Vit B12 Lvlon 2024   
   
                      Vit B12    321        Normal     180-914    [facility]  
   
                                        Comment on above:   Performed By: #### 2  
678114, 01383049, [id number], 5887468,   
5804652, 9295722, 350684240, 5305522397, 8966542, 9004907365,   
[id number] ####[facility] (DEFAULT)[address] [place] [address]   
   
                                                    XR Chest 1 View Frontalon    
   
                                                    XR Chest 1 View   
Frontal                         [facility]  
   
                                                    XR Pelvis 1 or 2 Viewson    
   
                                                    XR Pelvis 1 or 2   
Views                           [facility]  
   
                                                    Outside Recordson 2023  
   
   
                                        Outside Records     [id number]  
20  
[id number]  
.00OTGTIFF          [facility]  
   
                                                    Coding Summaryon 2023   
   
                      Coding Summary            [facility]  
   
                                                    Office/Clinic Noteon   
023   
   
                      Office/Clinic Note            [facility]  
   
                                                    ED Clinical Summaryon 2023   
   
                      ED Clinical Summary            [facility]  
   
                                                    ED Note - Physicianon 2023   
   
                      ED Note - Physician            [facility]  
   
                                                    ED Patient Education Noteon   
2023   
   
                                                    ED Patient Education   
Note                            [facility]  
   
                                                    ED Patient Summaryon   
023   
   
                      ED Patient Summary            [facility]  
   
                                                    Outside Recordson 10-  
   
   
                                        Outside Records     [id number]  
31  
[id number]#1.0  
0OTGTIFF            [facility]  
   
                                                    Outside Recordson 10-  
   
   
                                        Outside Records     [id number]  
40  
[id number]#[id number]  
OTGTIFF             [facility]  
   
                                                    Outside Recordson 2023  
   
   
                                        Outside Records     [id number]  
51  
[id number]#1.0  
0OTGTIFF            [facility]  
   
                                                    XR ANKLE RIGHT STANDARDon    
   
                                                    XR ANKLE RIGHT   
STANDARD                                Radiology exam is   
complete. No   
Radiologist dictation.   
Please follow up with   
ordering provider.  
  
  
  
  
Final result        Normal                                  [facility]  
  
  
  
Vital Signs  
  
  
                      Date Time  Vital Sign Value      Performing Clinician Marta alfaro  
   
                                                    10-   
14:                              Blood Pressure   
Location                                            Reny Galea  
Work Phone:   
[phone]                           [facility]  
   
                                                    10-   
14:          Body temperature    98.6 [degF]         Reny Galea  
Work Phone:   
[phone]                           [facility]  
   
                                                    10-   
14:                              Diastolic blood   
pressure                  65 mm[Hg]                 [facility]ale[facility]  
Work Phone:   
[phone]                           [facility]  
   
                                                    10-   
14:          Heart rate          85 /min             Reny Galea  
Work Phone:   
[phone]                           [facility]  
   
                                                    10-   
14:          Respiratory rate    16 /min             Reny Galea  
Work Phone:   
[phone]                           [facility]  
   
                                                    10-   
14:                              Systolic blood   
pressure                  131 mm[Hg]                [facility]ale[facility]  
Work Phone:   
[phone]                           [facility]  
  
  
  
Encounters  
  
  
                          Encounter Date Encounter Type Care Provider Facility  
   
                          Start: 10- ambulatory   [facility]  
ty:[id number]  
   
                                                    Start: 10-  
End: 10-     ambulatory          Reny J Galea      Facility:[facility]  
   
                                                    Start: 10-  
End: 10-                         Patient encounter   
procedure                               Reny J Galea  
Work Phone:   
[phone]                           [facility]  
Work Phone:   
[phone]  
   
                                                    Start: 2024  
End: 2024     ambulatory          Reny J Galea      Facility:[facility]  
   
                                                    Start: 2024  
End: 2024                         Patient encounter   
procedure                               Reny J Galea  
Work Phone:   
[phone]                           [facility]   
[facility]  
Work Phone:   
[phone]  
   
                                                    Start: 2024  
End: 2024     ambulatory          BHAVIN EARL           Not Available  
   
                                                    Start: 2024  
End: 2024                         Evaluation and   
management of inpatient   Bryon Arzola             Facility:[facility]  
   
                                                    Start: 2024  
End: 2024     ambulatory          Bryon Arzola       Facility:[facility]  
   
                                                    Start: 2024  
End: 2024                         Emergency department   
patient visit             Bryon Arzola             Facility:[facility]  
   
                                                    Start: 2024  
End: 2024     ambulatory          Max HU Faizan      Facility:[facility]  
   
                                                    Start: 2024  
End: 2024     ambulatory          Bryon Arzola       Facility[facility]  
IC  
   
                                                    Start: 2024  
End: 2024     ambulatory          [facility]   
[facility]  
   
                                                    Start: 2024  
End: 2024     ambulatory          Bryon Arzola       Facility: [facility]  
IC  
   
                          Start: 2024 ambulatory   [facility]   
[facility]  
   
                          Start: 2024 Telephone encounter [facility]   
Cardiology  
   
                                        Comment on above:   Hospital Follow-up   
   
                                                    Start: 2024  
End: 2024                         Evaluation and   
management of inpatient   Bryon Arzola             Facility:[facility]  
   
                                                    Start: 2024  
End: 2024                         Emergency department   
patient visit             Bryon Arzola             Facility:[facility]  
   
                                                    Start: 2023  
End: 2023     ambulatory          Bryno Arzola       Facility[facility]  
IC  
   
                                                    Start: 2023  
End: 2023                         Emergency department   
patient visit             Bryon Arzola             Facility:[facility]  
  
  
  
Procedures  
  
  
                          Date         Procedure    Procedure Detail Performing   
Clinician  
   
                          Start: 2024 Follow-up visit Follow-up    WILLIAM   
[facility]  
   
                                        Start: 2020   Cystourethroscopy wi  
th   
dilation of urethral   
stricture                                           Ernylinda Blanton  
Work Phone:   
[phone]  
   
                                        Start: 2019   Cystourethroscopy wi  
th   
dilation of urethral   
stricture                                           Reny Galea  
Work Phone:   
[phone]  
   
                                        Comment on above:   2009, 3/1/2011,  
 2013, 10/17/2013, 10/17/2013,   
2014,   
2015, 10/13/2015, 2016, 10/17/2017, 2018, 2019   
   
                                        Start: 2011   Cystourethroscopy wi  
th   
internal male urethrotomy                           Reny Galea  
Work Phone:   
[phone]  
   
                                        Comment on above:   2009, 20  
11   
   
                                       Cholecystectomy              Reny Galea  
Work Phone:   
[phone]  
   
                                       Partial resection of colon              A  
lysha Galea  
Work Phone:   
[phone]  
   
                                       Teleradiotherapy procedure              A  
lysha Galea  
Work Phone:   
[phone]  
   
                                        Comment on above:      
   
                                       Transurethral prostatectomy                
Reny Galea  
Work Phone:   
[phone]  
   
                                        Comment on above:   Dr. Combs   
  
  
  
Plan of Treatment  
  
  
                          Date         Care Activity Detail       Author  
   
                                                    Start:   
2023          Influenza vaccination Influenza Vaccine   [facility]   
[facility]  
   
                                                    Start:   
10-          Fall Risk Screening Fall Risk Screening [facility]   
[facility]  
   
                                                    Start:   
10-                              Administration of   
varicella zoster vaccine                Zoster (Shingles)   
Vaccine (1 of 2)                        [facility]   
[facility]  
   
                                                    Start:   
10-                              DTaP,Tdap and Td Vaccines   
(1 - Tdap)                              DTaP,Tdap and Td   
Vaccines (1 - Tdap)                     [facility]   
[facility]  
   
                                                    Start:   
10-          Adult BMI Screening Adult BMI Screening [facility]   
System  
   
                                                    Start:   
10-          Depression Screening Depression Screening [facility]   
System  
   
                                                    Start:   
10-          Tobacco Screening   Tobacco Screening   [facility]   
[facility]  
   
                                                    Start:   
1935                              Medicare Annual Wellness   
Visit                                   Medicare Annual   
Wellness Visit                          [facility]   
System  
  
  
  
Payers  
  
  
                          Date         Payer Category Payer        Policy ID  
   
                          2019   Medicare                  [id number]  
   
                                2016      Private Health Insurance [employer]   
COMMERCIAL HU[employer]NA   
COMMERCIAL [id number]   
2016-Present po [address]   
[address] [place] [address]               [id number]  
[id number]  
   
                          2016   Private Health Insurance              [id number]  
[id number]  
   
                                10-      Medicare        MEDICARE MEDICAR  
[address]   
[id number]   
10/1/2000-Present   
[phone] [address] [address]   
[place] [address]                  [id number]  
[id number]  
   
                          10-   Medicare                  [id number]  
   
                          1935   Unknown                   [id number]   
[id number]  
[id number]  
   
                          1935   Unknown                   [id number]   
[id number]  
[id number]  
   
                          1935   Unknown                   [id number]   
[id number]  
[id number]  
   
                          1935   Unknown                   [id number]   
[id number]  
[id number]  
   
                          1935   Unknown                   [id number]   
[id number]  
[id number]  
   
                          1935   Unknown                   [id number]   
[id number]  
[id number]  
   
                          1935   Unknown                   [id number]   
[id number]  
[id number]  
   
                          1935   Unknown                   [id number]   
[id number]  
[id number]  
   
                          1935   Unknown                   [id number]   
[id number]  
[id number]  
   
                          1935   Unknown                   [id number]   
[id number]  
[id number]  
   
                          1935   Unknown                   [id number]   
[id number]  
[id number]  
   
                          1935   Unknown                   [id number]   
[id number]  
[id number]  
   
                          1935   Unknown                   [id number]   
[id number]  
[id number]  
   
                          1935   Unknown                   [id number]   
[id number]  
[id number]  
   
                          1935   Unknown                   [id number]   
[id number]  
[id number]  
  
  
  
Social History  
  
  
                          Date         Type         Detail       Facility  
   
                                                            [facility]  
[facility]                                    Tob[facility] smoking   
consumption unknown                     [facility]  
   
                                        Start: 03-   History of Social   
function                                            [facility]  
   
                          Start: 03- Childcare                 [facility]  
   
                                       Childcare    Unknown      [facility]   
System  
   
                          Start: 1935 Sex Assigned At Birth Not on file  P  
[facility] [employer]   
[facility]  
   
                                                    Start: 2020  
End: 10-           Tobacco smoking status    Never smoked tobacco   
(finding)                               [facility]  
  
  
  
Functional Status  
  
  
                          Date         Assessment   Result       Facility  
   
                          10-   Functional Status N/A          [facility]  
  
  
  
Clinical Notes 2024 to 10-  
Telephone Encounter - Rosa Hernandez - 2024 8:47 AM EDTTelephone Encounter  
 - Kalpana Gu MA - 2024 8:47 AM EDTTelephone Encounter - Kalpana Gu MA - 2024 8:47 AM EDT  
  
                                Note Date & Type Note            Facility  
   
                                                    10- Hospital Discharg  
e   
instructions                              
  
  
Patient Education  
  
  
10/03/2024 15:28:44  
  
  
Acute Urinary Retention, Male  
  
  
Acute Urinary Retention, Male  
Acute urinary retention is a   
condition in which a person is   
unable to pass urine or can   
only pass a little urine. This   
condition can happen suddenly   
and last for a short time. If   
left untreated, it can become   
long-term (chronic) and result   
in kidney damage or other   
serious complications.  
What are the causes?  
This condition may be caused   
by:  
Obstruction or narrowing of   
the tube that drains the   
bladder (urethra). This may be   
caused by surgery, problems   
with nearby organs, or injury   
to the bladder or urethra.  
Problems with the nerves in   
the bladder.  
Tumors in the area of the   
pelvis, bladder, or urethra.  
Certain medicines.  
Bladder or urinary tract   
infection.  
Constipation.  
What increases the risk?  
This condition is more likely   
to develop in older men. As   
men age, their prostate may   
become larger and may start to   
press or squeeze on the   
bladder or the urethra. Other   
chronic health conditions can   
increase the risk of acute   
urinary retention. These   
include:  
Diseases such as multiple   
sclerosis.  
Spinal cord injuries.  
Diabetes.  
Degenerative cognitive   
conditions, such as delirium   
or dementia.  
Psychological conditions. A   
man may hold his urine due to   
trauma or because he does not   
want to use the bathroom.  
What are the signs or   
symptoms?  
Symptoms of this condition   
include:  
Trouble urinating.  
Pain in the lower abdomen.  
How is this diagnosed?  
This condition is diagnosed   
based on a physical exam and   
your medical history. You may   
also have other tests,   
including:  
An ultrasound of the bladder   
or kidneys or both.  
Blood tests.  
A urine analysis.  
Additional tests may be   
needed, such as a CT scan,   
MRI, and kidney or bladder   
function tests.  
How is this treated?  
Treatment for this condition   
may include:  
Medicines.  
Placing a thin, sterile tube   
(catheter) into the bladder to   
drain urine out of the body.   
This is called an indwelling   
urinary catheter. After it is   
inserted, the catheter is held   
in place with a small balloon   
that is filled with sterile   
water. Urine drains from the   
catheter into a collection bag   
outside of the body.  
Behavioral therapy.  
Treatment for other   
conditions.  
If needed, you may be treated   
in the hospital for kidney   
function problems or to manage   
other complications.  
Follow these instructions at   
home:  
Medicines  
Take over-the-counter and   
prescription medicines only as   
told by your health care   
provider. Avoid certain   
medicines, such as   
decongestants, antihistamines,   
and some prescription   
medicines. Do not take any   
medicine unless your health   
care provider approves.  
If you were prescribed an   
antibiotic medicine, take it   
as told by your health care   
provider. Do not stop using   
the antibiotic even if you   
start to feel better.  
General instructions  
Do not use any products that   
contain nicotine or tobacco.   
These products include   
cigarettes, chewing tobacco,   
and vaping devices, such as   
e-cigarettes. If you need help   
quitting, ask your health care   
provider.  
Drink enough fluid to keep   
your urine pale yellow.  
If you have an indwelling   
urinary catheter, follow the   
instructions from your health   
care provider.   
Monitor any changes in your   
symptoms. Tell your health   
care provider about any   
changes.  
If instructed, monitor your   
blood pressure at home. Report   
changes as told by your health   
care provider.  
Keep all follow-up visits.   
This is important.  
Contact a health care provider   
if:  
You have uncomfortable bladder   
contractions that you cannot   
control (spasms).  
You leak urine with the   
spasms.  
Get help right away if:  
You have chills or a fever.  
You have blood in your urine.  
You have a catheter and the   
following happens:  
?Your catheter stops draining   
urine.  
?Your catheter falls out.  
Summary  
Acute urinary retention is a   
condition in which a person is   
unable to pass urine or can   
only pass a little urine. If   
left untreated, this condition   
can result in kidney damage or   
other serious complications.  
An enlarged prostate may cause   
this condition. As men age,   
their prostate gland may   
become larger and may press or   
squeeze on the bladder or the   
urethra.  
Treatment for this condition   
may include medicines and   
placement of an indwelling   
urinary catheter.  
Monitor any changes in your   
symptoms. Tell your health   
care provider about any   
changes.  
This information is not   
intended to replace advice   
given to you by your health   
care provider. Make sure you   
discuss any questions you have   
with your health care   
provider.  
Document Revised: 2021   
Document Reviewed: 2021  
[employer] Patient Education   
 [employer]  
  
  
  
Follow Up Care  
  
  
2024 10:58:26  
  
  
With:Franny MEDINA, Reny HU,   
URL  
Address:  
  
When:  
Unknown  
Comments:after cysto/UD                 [facility]  
Work Phone: [phone]  
   
                                        10- Note     Patient Education  
Urology  
Acute Urinary Retention, Male  
(Inserted Image. Unable to   
display)  
Acute urinary retention is a   
condition in which a person is   
unable to pass urine or can   
only pass a little urine. This   
condition can happen suddenly   
and last for a short time. If   
left untreated, it can become   
long-term (chronic) and result   
in kidney damage or other   
serious complications.  
What are the causes?  
This condition may be caused   
by:  
? Obstruction or narrowing of   
the tube that drains the   
bladder (urethra). This may be   
caused by surgery, problems   
with nearby organs, or injury   
to the bladder or urethra.  
? Problems with the nerves in   
the bladder.  
? Tumors in the area of the   
pelvis, bladder, or urethra.  
? Certain medicines.  
? Bladder or urinary tract   
infection.  
? Constipation.  
What increases the risk?  
This condition is more likely   
to develop in older men. As   
men age, their prostate may   
become larger and may start to   
press or squeeze on the   
bladder or the urethra. Other   
chronic health conditions can   
increase the risk of acute   
urinary retention. These   
include:  
? Diseases such as multiple   
sclerosis.  
? Spinal cord injuries.  
? Diabetes.  
? Degenerative cognitive   
conditions, such as delirium   
or dementia.  
? Psychological conditions. A   
man may hold his urine due to   
trauma or because he does not   
want to use the bathroom.  
What are the signs or   
symptoms?  
Symptoms of this condition   
include:  
? Trouble urinating.  
? Pain in the lower abdomen.  
How is this diagnosed?  
This condition is diagnosed   
based on a physical exam and   
your medical history. You may   
also have other tests,   
including:  
? An ultrasound of the bladder   
or kidneys or both.  
? Blood tests.  
? A urine analysis.  
? Additional tests may be   
needed, such as a CT scan,   
MRI, and kidney or bladder   
function tests.  
How is this treated?  
Treatment for this condition   
may include:  
? Medicines.  
? Placing a thin, sterile tube   
(catheter) into the bladder to   
drain urine out of the body.   
This is called an indwelling   
urinary catheter. After it is   
inserted, the catheter is held   
in place with a small balloon   
that is filled with sterile   
water. Urine drains from the   
catheter into a collection bag   
outside of the body.  
? Behavioral therapy.  
? Treatment for other   
conditions.  
If needed, you may be treated   
in the hospital for kidney   
function problems or to manage   
other complications.  
Follow these instructions at   
home:  
Medicines  
? Take over-the-counter and   
prescription medicines only as   
told by your health care   
provider. Avoid certain   
medicines, such as   
decongestants, antihistamines,   
and some prescription   
medicines. Do not take any   
medicine unless your health   
care provider approves.  
? If you were prescribed an   
antibiotic medicine, take it   
as told by your health care   
provider. Do not stop using   
the antibiotic even if you   
start to feel better.  
General instructions  
? Do not use any products that   
contain nicotine or tobacco.   
These products include   
cigarettes, chewing tobacco,   
and vaping devices, such as   
e-cigarettes. If you need help   
quitting, ask your health care   
provider.  
? Drink enough fluid to keep   
your urine pale yellow.  
? If you have an indwelling   
urinary catheter, follow the   
instructions from your health   
care provider.  
? Monitor any changes in your   
symptoms. Tell your health   
care provider about any   
changes.  
? If instructed, monitor your   
blood pressure at home. Report   
changes as told by your health   
care provider.  
? Keep all follow-up visits.   
This is important.  
Contact a health care provider   
if:  
? You have uncomfortable   
bladder contractions that you   
cannot control (spasms).  
? You leak urine with the   
spasms.  
Get help right away if:  
? You have chills or a fever.  
? You have blood in your   
urine.  
? You have a catheter and the   
following happens:  
? Your catheter stops draining   
urine.  
? Your catheter falls out.  
Summary  
? Acute urinary retention is a   
condition in which a person is   
unable to pass urine or can   
only pass a little urine. If   
left untreated, this condition   
can result in kidney damage or   
other serious complications.  
? An enlarged prostate may   
cause this condition. As men   
age, their prostate gland may   
become larger and may press or   
squeeze on the bladder or the   
urethra.  
? Treatment for this condition   
may include medicines and   
placement of an indwelling   
urinary catheter.  
? Monitor any changes in your   
symptoms. Tell your health   
care provider about any   
changes.  
This information is not   
intended to replace advice   
given to you by your health   
care provider. Make sure you   
discuss any questions you have   
with your health care   
provider.  
Document Revised: 2021   
Document Reviewed: 2021  
[employer] Patient Education ?   
 [employer]vier [employer]                      [facility]  
   
                                        2024 Miscellaneous Notes Formattin  
g of this note might   
be different from the   
original.  
Message from the 3/8/24   
discharge list per [facility].  
He signed off patient care   
from .  
Dx Elevated Troponin  
Patient to f/u in 1 to 2 weeks   
post d/c  
bvb  
Electronically signed by Rosa Hernandez at 2024 8:48   
AM EDT  
Formatting of this note might   
be different from the   
original.  
Called pt to schedule f/u appt   
from recent IP stay. No   
Answer. No Machine. UCHE  
Electronically signed by   
Kalpana Gu MA at   
2024 10:18 AM EDT  
Formatting of this note might   
be different from the   
original.  
Pt currently at [facility], [phone], Called to   
scheduled f/u appt, No Answer.   
LMOM. UCHE  
Electronically signed by   
Kalpana Gu MA at   
2024 1:10 PM EDT  
documented in this encounter            [facility]  
   
                                                    2024 Telephone encount  
er   
Note                                    Formatting of this note might   
be different from the   
original.  
Message from the 3/8/24   
discharge list per [facility].  
He signed off patient care   
from .  
Dx Elevated Troponin  
Patient to f/u in 1 to 2 weeks   
post d/c  
bvb  
Electronically signed by Rosa Hernandez at 2024 8:48   
AM EDT  
                                        [facility]  
   
                                                    2024 Telephone encount  
er   
Note                                    Formatting of this note might   
be different from the   
original.  
Called pt to schedule f/u appt   
from recent IP stay. No   
Answer. No Machine. EDWINW  
Electronically signed by   
Kalpana Gu MA at   
2024 10:18 AM EDT  
                                        [facility]  
   
                                                    2024 Telephone encount  
er   
Note                                    Formatting of this note might   
be different from the   
original.  
Pt currently at [facility], [phone], Called to   
scheduled f/u appt, No Answer.   
LMOM. [facility]  
Electronically signed by   
Kalpana Gu MA at   
2024 1:10 PM EDT  
                                        [facility]  
   
                                        2024 Note     Dr. ANGELIC Mendez calls a  
nd speaks   
with Dr. Rand regarding   
plan of care and possible   
admission.  
Electronically Signed by:   
Ya Parson on 2024   
11:29 [facility]  
   
                                        2024 Note     Dr. Rand speaks  
 with Dr. Brower, cardiologist   
regarding plan of care.  
Electronically Signed by:   
Ya Parson on 2024   
10:55 [facility]  
   
                                        Evaluation + Plan note   
  
  
Future Appointments  
  
  
Appointment Date:10/03/2024   
02:30:00 PM  
Scheduled Provider:Reny Lo  
Location:[facility]  
Appointment Type:URO Office   
Visit  
                                        [facility]  
Work Phone: [phone]  
   
                                        Hospital course Narrative   
  
  
No data available for this   
section                                 [facility]  
Work Phone: [phone]  
   
                                                    Hospital Discharge   
instructions                              
  
  
No data available for this   
section                                 [facility]  
Work Phone: [phone]  
   
                                        Instructions        Not on filedocumente  
d in this   
encounter                               [facility]  
   
                                        Progress note         
  
  
No data available for this   
section                                 [facility]   
[facility]  
Work Phone: [phone]  
  
  
  
Summary Purpose  
  
  
                                                      
  
  
  
Family History  
No Family History Records FoundNo Family History Records FoundNo Family History 
Records FoundNo Family History Records Found  
  
No data available for this section  
  
No Family History Records Found  
  
No data available for this section  
  
  
  
Advance Directives  
No Advanced Directives Records FoundNo Advanced Directives Records FoundNo 
Advanced Directives Records FoundNo Advanced Directives Records FoundNo Advanced
 Directives Records Found  
  
Additional Source Comments  
  
  
  
                                                    PREGNANCY (unrecognized sect  
ion and content)  
   
                                                    No Pregnancy Status Records   
FoundNo Pregnancy Status Records FoundNo Pregnancy   
Status   
Records FoundNo Pregnancy Status Records FoundNo Pregnancy Status Records Found  
  
  
  
  
                                                    INFORMATION SOURCE (unrecogn  
ized section and content)  
   
                                          
  
  
  
                                        DATE CREATED        AUTHOR  
   
                                2018                      [facility]  
  
  
  
                                DATE CREATED    AUTHOR          AUTHOR'S ORGANIZ  
ATION  
   
                                2024                      [facility]  
[facility]  
  
  
  
                                DATE CREATED    AUTHOR          AUTHOR'S ORGANIZ  
ATION  
   
                                2024                      [facility]  
l  
  
  
  
                                DATE CREATED    AUTHOR          AUTHOR'S ORGANIZ  
ATION  
   
                                2024                      [facility]  
[facility]  
  
  
  
                                DATE CREATED    AUTHOR          AUTHOR'S ORGANIZ  
ATION  
   
                                10/04/2024                      [facility]  
  
  
  
  
  
                                                    Reason for Visit (unrecogniz  
ed section and content)  
   
                                          
  
  
  
                                Reason          Onset Date      Comments  
   
                                Hospital Follow-up 2024        
  
  
  
  
  
                                                    Patient Care team informatio  
n (unrecognized section and content)  
   
                                                      
  
  
Personnel  
  
  
Name: BRYON ARZOLA MD  
Address:  
Address: [address] [place] [address]  
  
  
  
Personnel  
  
  
Name: BRYON ARZOLA MD  
Address:  
Address: [address] [place] [address]  
  
  
FOR RECORDS PERTAINING TO PATIENTS WHO ARE OR HAVE BEEN ENROLLED IN A CHEMICAL 
DEPENDENCY/SUBSTANCEABUSE PROGRAM, SOME INFORMATION MAY BE OMITTED. This 
clinical summary was aggregated from multiple sources. Caution should be 
exercised in using it in the provision of clinical care. This summary normalizes
 information from multiple sources, and as a consequence, information in this 
document may materially change the coding, format and clinical context of 
patient data. In addition, data may be omitted in some cases. CLINICAL DECISIONS
 SHOULD BE BASED ON THE PRIMARY CLINICAL RECORDS. [facility]. provides
 no warranty or guarantee of the accuracy or completeness of information in this
 document.

## 2024-10-25 NOTE — PM.HP
HPI
H&P: HPI
History of Present Illness
Chief complaint: OTHER/KIDNEY STONES/ HYDRONEPHROSIS
Narrative: 
89-year-old male with history of Parkinson, hypertension presented to ER with 1 day history of nausea, left-sided flank pain.  He denies fever, vomiting, dysuria, hematuria or diarrhea.  Workup in ER revealed nonobstructing 5 mm stone within the 
distal right ureter and mild left hydronephrosis secondary to a partially obstructing 5 mm stone within the mid left ureter.  ER discussed patient's CT scan finding and clinical presentation with on-call urology who recommended admission for 
observation and possible intervention if his symptoms/obstruction are persistent and do not improve with conservative management.  At the time of my evaluation, patient was fairly comfortable with no active symptoms to offer except for mild nausea.  
He was quite tremulous because he did not take his Parkinson medications in the morning because of nausea.  Otherwise he is at his baseline functional status.

Opioid HPI
Opioid Management
Most Recent Pain and Opioid Data: 
      Last Pain Scale 5 10/25/24 13:06 10/25/24
      Last Pain Assessment 10/25/24 13:41  
      Last MAR Pain Assessment 10/25/24 13:06  
      Last ORT Total Score 0 10/25/24 09:46 10/25/24
      Last ORT Risk Category Low Risk 10/25/24 09:46 10/25/24


Review of Systems
ROS  
 Status of ROS 10 or more systems reviewed and unremarkable except as noted in history and below   

[facility]
Medical History (Updated 10/25/24 @ 14:44 by Shaikh China MD)

CKD stage 3a, GFR 45-59 ml/min
 ?N18.31 - Chronic kidney disease, stage 3a (ICD-10)
Hypertension
 ?I10 - Essential (primary) hypertension (ICD-10)
Anemia
 ?D64.9 - Anemia, unspecified (ICD-10)
Hard of hearing
 ?H91.90 - Unspecified hearing loss, unspecified ear (ICD-10)
History of external beam radiation therapy
 ?Z92.3 - Personal history of irradiation (ICD-10)
Urethral stricture
 ?N35.919 - Unspecified urethral stricture, male, unspecified site (ICD-10)
Stroke
 ?I63.9 - Cerebral infarction, unspecified (ICD-10)
Parkinson disease
 ?G20.A1 - Parkinson's disease without dyskinesia, without mention of fluctuations (ICD-10)
Microscopic hematuria
 ?R31.29 - Other microscopic hematuria (ICD-10)
Hypertension
 ?I10 - Essential (primary) hypertension (ICD-10)
Hyperlipidemia
 ?E78.5 - Hyperlipidemia, unspecified (ICD-10)
Prostate cancer
 ?C61 - Malignant neoplasm of prostate (ICD-10)
Enlarged prostate
 ?N40.0 - Benign prostatic hyperplasia without lower urinary tract symptoms (ICD-10)


Surgical History (Updated 10/22/24 @ 14:34 by Stephanie Lua RN)

Hx of transurethral resection of prostate
 ?Z98.890 - Other specified postprocedural states (ICD-10)
 ?Z90.79 - Acquired absence of other genital organ(s) (ICD-10)
H/O partial resection of colon
 ?Z90.49 - Acquired absence of other specified parts of digestive tract (ICD-10)
Hx of cholecystectomy
 ?Z90.49 - Acquired absence of other specified parts of digestive tract (ICD-10)
History of appendectomy
 ?Z90.49 - Acquired absence of other specified parts of digestive tract (ICD-10)
S/P cystourethroscopy with dilation of urethral stricture
 ?Z98.890 - Other specified postprocedural states (ICD-10)


Family History (Updated 10/22/24 @ 14:34 by Stephanie Lua RN)
Other
 Family history of cancer



Social History (Updated 10/24/24 @ 13:14 by Kari Rodríguez)
Within the past year, how often did you have a drink containing alcohol:  never 
Score interpretation:  A score less than 4 is consistent with normal alcohol consumption. 
Smoking status:  Never smoker 
Non-prescribed substance use:  denies use 
Highest level of school completed/degree received:  high school graduate 
Little interest or pleasure in doing things:  not at all 
Feeling down, depressed, or hopeless:  not at all 



Meds
Home Medications and Allergies
Home Medications

?Medication ?Instructions ?Recorded ?Confirmed ?Type
amantadine HCl 100 mg capsule 100 mg PO BID 10/22/24 10/25/24 History
amlodipine 10 mg tablet 10 mg PO DAILY 10/22/24 10/25/24 History
aspirin 81 mg capsule 81 mg PO DAILY 10/22/24 10/25/24 History
cholecalciferol (vitamin D3) 10 400 unit PO DAILY 10/22/24 10/25/24 History
mcg (400 unit) capsule    
polyethylene glycol 3350 17 17 g PO DAILY 10/24/24 10/25/24 History
gram/dose oral powder (Miralax)    
tolterodine 4 mg capsule,extended 4 mg PO DAILY 10/24/24 10/25/24 History
release 24 hr    
carbidopa ER 25 mg-levodopa 100 mg tab PO 10/25/24  History
tablet,extended release    


Allergies

Allergy/AdvReac Type Severity Reaction Status Date / Time
No Known Drug Allergies Allergy   Verified 10/22/24 14:35



Exam
Constitutional
Vital Signs, click to edit/add: 
Last Vital Signs

Temp  97.6 F   10/25/24 13:15
Pulse  97 H  10/25/24 13:15
Resp  20   10/25/24 13:15
BP  151/69 H  10/25/24 14:29
Pulse Ox  94 L  10/25/24 13:15
O2 Del Method  Room Air  10/25/24 13:15


Documenting provider has reviewed patient's vital signs: yes
Common normals: oriented x3
General appearance: cooperative and frail appearing
HENMT
Common normals: normocephalic and head/scalp atraumatic
Head and scalp: normocephalic and atraumatic
Eye
Common normals: conjunctivae normal and no scleral icterus
Conjunctiva: conjunctiva(e) normal
Respiratory
Common normals: normal respiratory effort and clear to auscultation bilaterally
Effort & inspection: able to speak in complete sentences
Auscultation: clear to auscultation bilaterally
Cardio
Common normals: regular rate, S1 normal heart sound and S2 normal heart sound
Rate: regular rate
Heart sounds: S1 normal and S2 normal
GI
Common normals: Normal to inspection, nondistended, normoactive bowel sounds present, soft to palpation, non-tender and no hepatosplenomegaly
Palpation: soft and no hepatosplenomegaly
Extremity
Common normals: no clubbing, cyanosis or edema
Neuro
Common normals: oriented x3, moves all extremities and no focal motor deficits
Motor exam: tremor
Psych
Common normals: mental status grossly normal, denies hallucinations, denies homicidal ideation and denies suicidal ideation

Results
Labs
Labs: 
Short CBC

  10/25/24 Range/Units
  05:25 
WBC  11.9 H  (4.0-11.0)  10^3/uL
Hgb  14.1  (14.0-18.0)  g/dL
Hct  42.5  (42.0-54.0)  %
Plt Count  251  (150-450)  10^3/uL

BMP

  10/25/24
  05:25
Sodium  141
Potassium  4.5
Chloride  106
Carbon Dioxide  23.5
BUN  28.0 H
Creatinine  1.66 H
Glucose  143 H
Calcium  9.3

Urine

  10/25/24 Range/Units
  05:20 
Urine Color  Lt. yellow  (YELLOW)  
Urine Clarity  Clear  (CLEAR)  
Urine pH  5.5  (5.0-9.0)  
Ur Specific Gravity  >=1.030 A  (1.005-1.025)  
Urine Protein  Trace  (NEG/TRACE)  mg/dL
Urine Glucose (UA)  Negative  (NEGATIVE)  mg/dL



Assessment and Plan
Assessment and Plan
(1) Ureteral stone with hydronephrosis: 
      Assessment and Plan: 
Multiple b/l renal and ureteral calculi, with partially obstructing left ureteral stone. 
NPO for Urology evaluation. 
C/w conservative measures - on IVF, Anti emetics, flomax and pain control (oral and IV narcotics as needed)
(2) Multiple renal calculi: 
      Assessment and Plan: 
Multiple b/l renal and ureteral calculi, with partially obstructing left ureteral stone. 
NPO for Urology evaluation for left hydronephrosis. 
C/w conservative measures until seen by Urology- on IVF, Anti emetics, flomax and pain control (oral and IV narcotics as needed)

Scheduled for OR in the evening
(3) UTI (urinary tract infection): 
      Assessment and Plan: 
associated with and sec to nephrolithiasis. On IV rocephin. F/u urine cx. 
      Qualifiers:
        Urinary tract infection type: acute cystitis  Hematuria presence: without hematuria  Qualified Code(s): N30.00 - Acute cystitis without hematuria
(4) Hypertension: 
      Assessment and Plan: 
Poorly controlled likely due to pain and inability to take his PO medications. 
IV hydralazine as needed. Resume oral medications. 
      Qualifiers:
        Hypertension type: primary hypertension  Qualified Code(s): I10 - Essential (primary) hypertension
(5) Parkinson disease: 
      Assessment and Plan: 
Resume oral medications. Patient was considerably tremulous likely because he could not take his medications. 
      Qualifiers:
        Dyskinesia presence: with dyskinesia  Fluctuating  manifestations: with fluctuating manifestations  Qualified Code(s): G20.B2 - Parkinson's disease with dyskinesia, with fluctuations
(6) CKD stage 3a, GFR 45-59 ml/min: 
      Assessment and Plan: 
Mild elevation in serum Cr. On IVF. 

Plan
c/w IVF, pain control. NPO for OR - scheduled to go this evening. 
Considering his advanced age, Parkinson, it is not unreasonable to monitor him overnight post operatively. Plan for discharge tomorrow provided no intra/perioperative complications and patient's symptoms well controlled post operatively

## 2024-10-25 NOTE — PM.URSON
Urology Surgery Operative Note
Operative Note
Procedure Date: 10/25/24
Pre-op Diagnosis: bilateral ureter stones/bulbar urethral stricture
Post-op Diagnosis: same as pre-op
Procedures performed: bilateral urs/holmium laser stones and basket extraction stones and jj stents --all bilateral.    Also urethral dilation using wolf sounds 18-26fr 

Findings: stones in bilateral distal ureters removed; bulbar urethra approx 20fr stx.
Specimens: 
stones
Detailed description of Procedure: 


Pt prepped/draped.   Given iv abx.   21fr cystscopy done and bulbar urethra narrowed and dilated 18-26fr w/ wolf sounds.   Prostate appears fibrotic.   
ureteral catheter advanced in right ureter and 2 wires into right kidney.   rigid urs over wire and stone encountered distal ureter.    Holmium 270 fiber used 10/1 to break stone and then basket extracted.    variable length/6fr jj stent placed w/ 
good postioning noted.   Same exact procedure w/ same findings on left.    Bladder emptied and stones sent for anlaysis




dispostion---WILL NEED JJ STENTS REMOVED IN FEW WEEKS W/ EITHER DR CAMARGO OR DEBORA.   PT SON SAYS HAS SEEN BOTH SO THEY WILL NEED TO DECIDE ON FOLLOW UP.
CAN D/C TOMORROW ON ABX PRN.   NO OTHER MEDS NEEDED.

## 2024-10-25 NOTE — ED.ABDPAIN1
HPI - Abdominal Pain
General
Chief Complaint: Abdominal Pain
Stated Complaint: OTHER
Time Seen by Provider: 10/25/24 05:29
Source: patient
Mode of arrival: ambulance
Limitations: no limitations
History of Present Illness
HPI narrative: 
presents from nursing home. Reportedly has UTI but complains of nausea from medication that was ordered and he refuses to take it. No vomiting or diarrhea. Points to LLQ as site of pain
Related Data
Home Medications

?Medication ?Instructions ?Recorded ?Confirmed
amantadine HCl 100 mg capsule 100 mg PO DAILY 10/22/24 10/22/24
amlodipine 10 mg tablet 10 mg PO DAILY 10/22/24 10/22/24
aspirin 81 mg capsule 81 mg PO DAILY 10/22/24 10/22/24
carbidopa 25 mg-levodopa 250 mg 1 tab PO BID 10/22/24 10/22/24
tablet   
cholecalciferol (vitamin D3) 10 400 unit PO DAILY 10/22/24 10/22/24
mcg (400 unit) capsule   
polyethylene glycol 3350 17 17 g PO DAILY 10/24/24 10/24/24
gram/dose oral powder (Miralax)   
tolterodine 4 mg capsule,extended 4 mg PO DAILY 10/24/24 10/24/24
release 24 hr   


Allergies

Allergy/AdvReac Type Severity Reaction Status Date / Time
No Known Drug Allergies Allergy   Verified 10/22/24 14:35



Review of Systems
ROS  
 Status of ROS 10 or more systems reviewed and unremarkable except as noted in history and below   

[facility]
Medical History (Updated 10/25/24 @ 06:50 by Duane Tinsley MD)

Anemia
 ?D64.9 - Anemia, unspecified (ICD-10)
Hard of hearing
 ?H91.90 - Unspecified hearing loss, unspecified ear (ICD-10)
History of external beam radiation therapy
 ?Z92.3 - Personal history of irradiation (ICD-10)
Urethral stricture
 ?N35.919 - Unspecified urethral stricture, male, unspecified site (ICD-10)
Stroke
 ?I63.9 - Cerebral infarction, unspecified (ICD-10)
Parkinson disease
 ?G20.A1 - Parkinson's disease without dyskinesia, without mention of fluctuations (ICD-10)
Microscopic hematuria
 ?R31.29 - Other microscopic hematuria (ICD-10)
Hypertension
 ?I10 - Essential (primary) hypertension (ICD-10)
Hyperlipidemia
 ?E78.5 - Hyperlipidemia, unspecified (ICD-10)
Prostate cancer
 ?C61 - Malignant neoplasm of prostate (ICD-10)
Enlarged prostate
 ?N40.0 - Benign prostatic hyperplasia without lower urinary tract symptoms (ICD-10)


Surgical History (Updated 10/22/24 @ 14:34 by Stephanie Lua RN)

Hx of transurethral resection of prostate
 ?Z98.890 - Other specified postprocedural states (ICD-10)
 ?Z90.79 - Acquired absence of other genital organ(s) (ICD-10)
H/O partial resection of colon
 ?Z90.49 - Acquired absence of other specified parts of digestive tract (ICD-10)
Hx of cholecystectomy
 ?Z90.49 - Acquired absence of other specified parts of digestive tract (ICD-10)
History of appendectomy
 ?Z90.49 - Acquired absence of other specified parts of digestive tract (ICD-10)
S/P cystourethroscopy with dilation of urethral stricture
 ?Z98.890 - Other specified postprocedural states (ICD-10)


Family History (Updated 10/22/24 @ 14:34 by Stephanie Lua RN)
Other
 Family history of cancer



Social History (Updated 10/24/24 @ 13:14 by Kari Rodríguez)
Within the past year, how often did you have a drink containing alcohol:  never 
Score interpretation:  A score less than 4 is consistent with normal alcohol consumption. 
Smoking status:  Never smoker 
Non-prescribed substance use:  denies use 
Little interest or pleasure in doing things:  not at all 
Feeling down, depressed, or hopeless:  not at all 



Exam
Constitutional
Vital Signs, click to edit/add: 

Last Vital Signs

Temp  97.4 F L  10/25/24 05:12
Pulse  83   10/25/24 05:12
Resp  20   10/25/24 05:12
BP  170/90 H  10/25/24 05:12
Pulse Ox  95   10/25/24 05:12
O2 Del Method  Room Air  10/25/24 05:12



Common normals: no apparent distress, average body habitus, oriented x3, healthy appearing, alert and well nourished
[facility]
Common normals: normocephalic and head/scalp atraumatic
Eye
Common normals: EOMs intact bilaterally and conjunctivae normal
Respiratory
Common normals: normal respiratory effort, no retractions and no use of accessory muscles
Cardio
Common normals: regular rate, regular rhythm, S1 normal heart sound and S2 normal heart sound
GI
Common normals: Normal to inspection, nondistended, normoactive bowel sounds present and soft to palpation
Other: 
mild LLQ tenderness
Extremity
Common normals: normal to inspection and full ROM
Neuro
Common normals: oriented x3, CN's II-XII intact bilaterally and moves all extremities
Other: 
parkinson tremor right hand
Psych
Appearance: grossly normal

Course
Vital Signs
Vital signs: 

Vital Signs

Temperature  97.4 F L  10/25/24 05:12
Pulse Rate  83   10/25/24 05:12
Respiratory Rate  20   10/25/24 05:12
Blood Pressure  170/90 H  10/25/24 05:12
Pulse Oximetry  95   10/25/24 05:12
Oxygen Delivery Method  Room Air  10/25/24 05:12



Temperature  97.4 F L  10/25/24 05:12
Pulse Rate  83   10/25/24 05:12
Respiratory Rate  20   10/25/24 05:12
Blood Pressure  170/90 H  10/25/24 05:12
Pulse Oximetry  95   10/25/24 05:12
Oxygen Delivery Method  Room Air  10/25/24 05:12




MDM - Abdominal Pain
MDM Narrative
Medical decision making narrative: 
patient presents from nursing home. Reportedly refusing to take medication for UTI due to side effect of nausea. Exam neg except for parkinsons tremor and LLQ tenderness. UA nitrite and leukocyte neg. CT abdomen ordered and pending. CT results 
pending
Lab Data
Labs: 

Lab Results

  10/25/24 10/25/24 Range/Units
  05:20 05:25 
WBC   11.9 H  (4.0-11.0)  10^3/uL
RBC   4.76  (4.70-6.10)  10^6/uL
Hgb   14.1  (14.0-18.0)  g/dL
Hct   42.5  (42.0-54.0)  %
MCV   89.3  (80.0-94.0)  fL
MCH   29.6  (25.9-34.0)  pg
MCHC   33.2  (29.9-35.2)  g/dL
RDW   14.6  (11.0-15.0)  %
Plt Count   251  (150-450)  10^3/uL
MPV   9.7  (9.5-13.5)  fL
Neut % (Auto)   85.1 H  (43.0-75.0)  %
Lymph % (Auto)   8.5 L  (20.5-60.0)  %
Mono % (Auto)   5.8  (1.7-12.0)  %
Eos % (Auto)   0.1 L  (0.9-7.0)  %
Baso % (Auto)   0.1 L  (0.2-2.0)  %
Neut # (Auto)   10.1 H  (1.4-6.5)  10^3/uL
Lymph # (Auto)   1.0 L  (1.2-3.8)  10^3/uL
Mono # (Auto)   0.7  (0.3-0.8)  10^3/uL
Eos # (Auto)   0.0  (0.0-0.7)  10^3/uL
Baso # (Auto)   0.0  (0.0-0.1)  10^3/uL
Abs Immat Gran (auto)   0.05 H  (0.00-0.03)  10^3/uL
Imm/Tot Granulo (auto)   0.4  (0.0-0.5)  %
Sodium   141  (136-145)  mmol/L
Potassium   4.5  (3.5-5.1)  mmol/L
Chloride   106  ()  mmol/L
Carbon Dioxide   23.5  (21.0-32.0)  mmol/L
Anion Gap   16.0  
BUN   28.0 H  (7.0-18.0)  mg/dL
Creatinine   1.66 H  (0.70-1.30)  mg/dL
Est GFR ( Amer)   48 L  (>=60 mL/min/1.73m^2)  
Est GFR (Non-Af Amer)   39 L  (>=60 mL/min/1.73m^2)  
BUN/Creatinine Ratio   16.9  
Glucose   143 H  ()  mg/dL
Lactate   1.2  (0.4-2.0)  mmol/L
Calcium   9.3  (8.5-10.1)  mg/dL
Urine Color  Lt. yellow   (YELLOW)  
Urine Clarity  Clear   (CLEAR)  
Urine pH  5.5   (5.0-9.0)  
Ur Specific Gravity  >=1.030 A   (1.005-1.025)  
Urine Protein  Trace   (NEG/TRACE)  mg/dL
Urine Glucose (UA)  Negative   (NEGATIVE)  mg/dL
Urine Ketones  Trace A   (NEGATIVE)  mg/dL
Urine Occult Blood  Large A   (NEGATIVE)  
Urine Nitrite  Negative   (NEGATIVE)  
Urine Bilirubin  Negative   (NEGATIVE)  
Urine Urobilinogen  0.2   (0.2-1.0)  EU/dL
Ur Leukocyte Esterase  Negative   (NEGATIVE)  
Urine RBC  5-10 A   (0-2)  #/HPF
Urine WBC  2-5 A   (NONE SEEN)  #/HPF
Ur Squamous Epith Cells  Rare   (NONE/RARE)  #/LPF
Urine Crystals  Seen A   (None Seen)  #/HPF
Uric Acid Crystals  Rare   
Amorphous Sediment  Few   
Urine Bacteria  Small A   (NONE SEEN)  #/HPF
Urine Casts  None seen   (NONE SEEN)  #/LPF
Urine Mucus  None seen   (NONE SEEN)  
Ur Culture Indicated?  Yes   



Imaging Data
Chest x-ray: 
      Radiologist's impression: 

ITS Impressions

Abdomen/Pelvis CT  10/25/24 05:44
IMPRESSION: 
 
1. Bilateral ureteral stones; nonobstructing on right, obstructing versus 
partially obstructing on left.
2. Additional nonobstructing left kidney stones and benign-appearing cysts.
 
 
Electronically authenticated by: PRUDENCE MOTA   Date: 10/25/2024  06:46





Discharge Plan
Discharge
Patient Disposition: Still a Patient

## 2024-10-25 NOTE — ECG_ITS
[facility] 
                                        
                                       Test Date:    2024-10-25 
Pat Name:     TERRANCE FERRARA             Department:    
Patient ID:   [id number]               Room:         2141 
Gender:       Male                     Technician:    
:          [id number]               Requested By: 1850 
Order Number: [id number]              Reading MD:    
                                 Measurements 
Intervals                              Axis           
Rate:         84                       P:             
WY:                                    QRS:          81 
QRSD:         83                       T:            -7 
QT:           329                                     
QTc:          389                                     
                           Interpretive Statements 
ATRIAL FIBRILLATION 
LOW QRS VOLTAGE IN PRECORDIAL LEADS [QRS DEFLECTION < 1.0 mV IN CHEST LEADS] 
NONSPECIFIC T-WAVE ABNORMALITY 
No previous ECG available for comparison

## 2024-10-25 NOTE — PM.URCN
Urology - CN: HPI
Date of Consult
Requesting Physician: 
Shaikh China MD

Primary Care Provider: 
MACIEL MERA

Family Provider: 
HUDSONE

Consult Narrative
Narrative: 
here w/ bilateral ureter stones.   Has hx urethral stx requiring dilation w/ dr tracy.   No recent udil's done however.   Reviewed issues adn details w/ children at bedside.

89-year-old male with history of Parkinson, hypertension presented to ER with 1 day history of nausea, left-sided flank pain.  He denies fever, vomiting, dysuria, hematuria or diarrhea.  Workup in ER revealed nonobstructing 5 mm stone within the 
distal right ureter and mild left hydronephrosis secondary to a partially obstructing 5 mm stone within the mid left ureter.  ER discussed patient's CT scan finding and clinical presentation with on-call urology who recommended admission for 
observation and possible intervention if his symptoms/obstruction are persistent and do not improve with conservative management.  At the time of my evaluation, patient was fairly comfortable with no active symptoms to offer except for mild nausea.  
He was quite tremulous because he did not take his Parkinson medications in the morning because of nausea.  Otherwise he is at his baseline functional status.
cc:: 
CC: Shaikh China MD


[facility]
Medical History (Updated 10/25/24 @ 14:44 by Shaikh China MD)

CKD stage 3a, GFR 45-59 ml/min
 ?N18.31 - Chronic kidney disease, stage 3a (ICD-10)
Hypertension
 ?I10 - Essential (primary) hypertension (ICD-10)
Anemia
 ?D64.9 - Anemia, unspecified (ICD-10)
Hard of hearing
 ?H91.90 - Unspecified hearing loss, unspecified ear (ICD-10)
History of external beam radiation therapy
 ?Z92.3 - Personal history of irradiation (ICD-10)
Urethral stricture
 ?N35.919 - Unspecified urethral stricture, male, unspecified site (ICD-10)
Stroke
 ?I63.9 - Cerebral infarction, unspecified (ICD-10)
Parkinson disease
 ?G20.A1 - Parkinson's disease without dyskinesia, without mention of fluctuations (ICD-10)
Microscopic hematuria
 ?R31.29 - Other microscopic hematuria (ICD-10)
Hypertension
 ?I10 - Essential (primary) hypertension (ICD-10)
Hyperlipidemia
 ?E78.5 - Hyperlipidemia, unspecified (ICD-10)
Prostate cancer
 ?C61 - Malignant neoplasm of prostate (ICD-10)
Enlarged prostate
 ?N40.0 - Benign prostatic hyperplasia without lower urinary tract symptoms (ICD-10)


Surgical History (Updated 10/22/24 @ 14:34 by Stephanie Lua RN)

Hx of transurethral resection of prostate
 ?Z98.890 - Other specified postprocedural states (ICD-10)
 ?Z90.79 - Acquired absence of other genital organ(s) (ICD-10)
H/O partial resection of colon
 ?Z90.49 - Acquired absence of other specified parts of digestive tract (ICD-10)
Hx of cholecystectomy
 ?Z90.49 - Acquired absence of other specified parts of digestive tract (ICD-10)
History of appendectomy
 ?Z90.49 - Acquired absence of other specified parts of digestive tract (ICD-10)
S/P cystourethroscopy with dilation of urethral stricture
 ?Z98.890 - Other specified postprocedural states (ICD-10)


Family History (Updated 10/22/24 @ 14:34 by Stephanie Lua RN)
Other
 Family history of cancer



Social History (Updated 10/24/24 @ 13:14 by Kari Rodríguez)
Within the past year, how often did you have a drink containing alcohol:  never 
Score interpretation:  A score less than 4 is consistent with normal alcohol consumption. 
Smoking status:  Never smoker 
Non-prescribed substance use:  denies use 
Highest level of school completed/degree received:  high school graduate 
Little interest or pleasure in doing things:  not at all 
Feeling down, depressed, or hopeless:  not at all 



Meds
Home Medications and Allergies
Home Medications

?Medication ?Instructions ?Recorded ?Confirmed ?Type
amantadine HCl 100 mg capsule 100 mg PO BID 10/22/24 10/25/24 History
amlodipine 10 mg tablet 10 mg PO DAILY 10/22/24 10/25/24 History
aspirin 81 mg capsule 81 mg PO DAILY 10/22/24 10/25/24 History
cholecalciferol (vitamin D3) 10 400 unit PO DAILY 10/22/24 10/25/24 History
mcg (400 unit) capsule    
polyethylene glycol 3350 17 17 g PO DAILY 10/24/24 10/25/24 History
gram/dose oral powder (Miralax)    
tolterodine 4 mg capsule,extended 4 mg PO DAILY 10/24/24 10/25/24 History
release 24 hr    
carbidopa ER 25 mg-levodopa 100 mg tab PO 10/25/24  History
tablet,extended release    


Allergies

Allergy/AdvReac Type Severity Reaction Status Date / Time
No Known Drug Allergies Allergy   Verified 10/22/24 14:35



Exam
Constitutional
Vital Signs, click to edit/add: 
Last Vital Signs

Temp  97.6 F   10/25/24 13:15
Pulse  97 H  10/25/24 13:15
Resp  20   10/25/24 13:15
BP  151/69 H  10/25/24 14:29
Pulse Ox  94 L  10/25/24 13:15
O2 Del Method  Room Air  10/25/24 13:15



Results
Labs
Labs: 
Short CBC

  10/25/24 Range/Units
  05:25 
WBC  11.9 H  (4.0-11.0)  10^3/uL
Hgb  14.1  (14.0-18.0)  g/dL
Hct  42.5  (42.0-54.0)  %
Plt Count  251  (150-450)  10^3/uL

BMP

  10/25/24
  05:25
Sodium  141
Potassium  4.5
Chloride  106
Carbon Dioxide  23.5
BUN  28.0 H
Creatinine  1.66 H
Glucose  143 H
Calcium  9.3

Urine

  10/25/24 Range/Units
  05:20 
Urine Color  Lt. yellow  (YELLOW)  
Urine Clarity  Clear  (CLEAR)  
Urine pH  5.5  (5.0-9.0)  
Ur Specific Gravity  >=1.030 A  (1.005-1.025)  
Urine Protein  Trace  (NEG/TRACE)  mg/dL
Urine Glucose (UA)  Negative  (NEGATIVE)  mg/dL



Urology Assessment and Plan
Assessment and Plan
(1) Ureteral stone with hydronephrosis: 
      Assessment and Plan: 
Multiple b/l renal and ureteral calculi, with partially obstructing left ureteral stone. 
NPO for Urology evaluation. 
C/w conservative measures - on IVF, Anti emetics, flomax and pain control (oral and IV narcotics as needed)
(2) Multiple renal calculi: 
      Assessment and Plan: 
Multiple b/l renal and ureteral calculi, with partially obstructing left ureteral stone. 
NPO for Urology evaluation for left hydronephrosis. 
C/w conservative measures until seen by Urology- on IVF, Anti emetics, flomax and pain control (oral and IV narcotics as needed)

Scheduled for OR in the evening
(3) UTI (urinary tract infection): 
      Assessment and Plan: 
associated with and sec to nephrolithiasis. On IV rocephin. F/u urine cx. 
      Qualifiers:
        Urinary tract infection type: acute cystitis  Hematuria presence: without hematuria  Qualified Code(s): N30.00 - Acute cystitis without hematuria
(4) Hypertension: 
      Assessment and Plan: 
Poorly controlled likely due to pain and inability to take his PO medications. 
IV hydralazine as needed. Resume oral medications. 
      Qualifiers:
        Hypertension type: primary hypertension  Qualified Code(s): I10 - Essential (primary) hypertension
(5) Parkinson disease: 
      Assessment and Plan: 
Resume oral medications. Patient was considerably tremulous likely because he could not take his medications. 
      Qualifiers:
        Dyskinesia presence: with dyskinesia  Fluctuating  manifestations: with fluctuating manifestations  Qualified Code(s): G20.B2 - Parkinson's disease with dyskinesia, with fluctuations
(6) CKD stage 3a, GFR 45-59 ml/min: 
      Assessment and Plan: 
Mild elevation in serum Cr. On IVF. 

Plan
d/w pt/family bedsdie
plan o.r. now w/ b/l jj stents and possible urs/laser if urine not infected
also hx urethral stx and will need dilation and alvarado for 1-2 days.   Family states he does NOT do well w/ alvarado's but they understand may need
They also are aware may only be able to place jj stents today and then would need f/u definitive stone tx w/ dr tracy after infection cleared (if present)

## 2024-10-25 NOTE — P.URCN_ITS
Urology - CN: HPI    
Date of Consult    
Requesting Physician:     
Shaikh China MD    
    
Primary Care Provider:     
MACIEL MERA    
    
Family Provider:     
HUDSONE    
    
Consult Narrative    
Narrative:     
here w/ bilateral ureter stones.   Has hx urethral stx requiring dilation w/ dr tracy.   No recent udil's done however.   Reviewed issues adn details w/ children  
at bedside.    
    
89-year-old male with history of Parkinson, hypertension presented to ER with 1   
day history of nausea, left-sided flank pain.  He denies fever, vomiting,   
dysuria, hematuria or diarrhea.  Workup in ER revealed nonobstructing 5 mm stone  
within the distal right ureter and mild left hydronephrosis secondary to a   
partially obstructing 5 mm stone within the mid left ureter.  ER discussed   
patient's CT scan finding and clinical presentation with on-call urology who   
recommended admission for observation and possible intervention if his   
symptoms/obstruction are persistent and do not improve with conservative   
management.  At the time of my evaluation, patient was fairly comfortable with   
no active symptoms to offer except for mild nausea.  He was quite tremulous   
because he did not take his Parkinson medications in the morning because of   
nausea.  Otherwise he is at his baseline functional status.    
cc::     
CC: Shaikh China MD    
    
    
[facility]    
Medical History (Updated 10/25/24 @ 14:44 by Shaikh China MD)    
    
CKD stage 3a, GFR 45-59 ml/min    
   ?N18.31 - Chronic kidney disease, stage 3a (ICD-10)    
Hypertension    
   ?I10 - Essential (primary) hypertension (ICD-10)    
Anemia    
   ?D64.9 - Anemia, unspecified (ICD-10)    
Hard of hearing    
   ?H91.90 - Unspecified hearing loss, unspecified ear (ICD-10)    
History of external beam radiation therapy    
   ?Z92.3 - Personal history of irradiation (ICD-10)    
Urethral stricture    
   ?N35.919 - Unspecified urethral stricture, male, unspecified site (ICD-10)    
Stroke    
   ?I63.9 - Cerebral infarction, unspecified (ICD-10)    
Parkinson disease    
   ?G20.A1 - Parkinson's disease without dyskinesia, without mention of   
   fluctuations (ICD-10)    
Microscopic hematuria    
   ?R31.29 - Other microscopic hematuria (ICD-10)    
Hypertension    
   ?I10 - Essential (primary) hypertension (ICD-10)    
Hyperlipidemia    
   ?E78.5 - Hyperlipidemia, unspecified (ICD-10)    
Prostate cancer    
   ?C61 - Malignant neoplasm of prostate (ICD-10)    
Enlarged prostate    
   ?N40.0 - Benign prostatic hyperplasia without lower urinary tract symptoms   
   (ICD-10)    
    
    
Surgical History (Updated 10/22/24 @ 14:34 by Stephanie Lua RN)    
    
Hx of transurethral resection of prostate    
   ?Z98.890 - Other specified postprocedural states (ICD-10)    
   ?Z90.79 - Acquired absence of other genital organ(s) (ICD-10)    
H/O partial resection of colon    
   ?Z90.49 - Acquired absence of other specified parts of digestive tract (ICD-  
   10)    
Hx of cholecystectomy    
   ?Z90.49 - Acquired absence of other specified parts of digestive tract (ICD-  
   10)    
History of appendectomy    
   ?Z90.49 - Acquired absence of other specified parts of digestive tract (ICD-  
   10)    
S/P cystourethroscopy with dilation of urethral stricture    
   ?Z98.890 - Other specified postprocedural states (ICD-10)    
    
    
Family History (Updated 10/22/24 @ 14:34 by Stephanie Lua RN)    
Other   Family history of cancer    
    
    
    
Social History (Updated 10/24/24 @ 13:14 by Kari Rodríguez)    
Within the past year, how often did you have a drink containing alcohol:  never     
Score interpretation:  A score less than 4 is consistent with normal alcohol   
consumption.     
Smoking status:  Never smoker     
Non-prescribed substance use:  denies use     
Highest level of school completed/degree received:  high school graduate     
Little interest or pleasure in doing things:  not at all     
Feeling down, depressed, or hopeless:  not at all     
    
    
    
Meds    
Home Medications and Allergies    
                                Home Medications    
    
    
    
?Medication ?Instructions ?Recorded ?Confirmed ?Type    
     
amantadine HCl 100 mg capsule 100 mg PO BID 10/22/24 10/25/24 History    
     
amlodipine 10 mg tablet 10 mg PO DAILY 10/22/24 10/25/24 History    
     
aspirin 81 mg capsule 81 mg PO DAILY 10/22/24 10/25/24 History    
     
cholecalciferol (vitamin D3) 10 400 unit PO DAILY 10/22/24 10/25/24 History    
    
mcg (400 unit) capsule        
     
polyethylene glycol 3350 17 17 g PO DAILY 10/24/24 10/25/24 History    
    
gram/dose oral powder (Miralax)        
     
tolterodine 4 mg capsule,extended 4 mg PO DAILY 10/24/24 10/25/24 History    
    
release 24 hr        
     
carbidopa ER 25 mg-levodopa 100 mg tab PO 10/25/24  History    
    
tablet,extended release        
    
    
    
                                    Allergies    
    
    
    
Allergy/AdvReac Type Severity Reaction Status Date / Time    
     
No Known Drug Allergies Allergy   Verified 10/22/24 14:35    
    
    
    
    
Exam    
Constitutional    
Vital Signs, click to edit/add:     
                                Last Vital Signs    
    
    
    
Temp  97.6 F   10/25/24 13:15    
     
Pulse  97 H  10/25/24 13:15    
     
Resp  20   10/25/24 13:15    
     
BP  151/69 H  10/25/24 14:29    
     
Pulse Ox  94 L  10/25/24 13:15    
     
O2 Del Method  Room Air  10/25/24 13:15    
    
    
    
    
Results    
Labs    
Labs:     
                                    Short CBC    
    
    
    
  10/25/24 Range/Units    
    
  05:25     
     
WBC  11.9 H  (4.0-11.0)  10^3/uL    
     
Hgb  14.1  (14.0-18.0)  g/dL    
     
Hct  42.5  (42.0-54.0)  %    
     
Plt Count  251  (150-450)  10^3/uL    
    
    
                                       BMP    
    
    
    
  10/25/24    
    
  05:25    
     
Sodium  141    
     
Potassium  4.5    
     
Chloride  106    
     
Carbon Dioxide  23.5    
     
BUN  28.0 H    
     
Creatinine  1.66 H    
     
Glucose  143 H    
     
Calcium  9.3    
    
    
                                      Urine    
    
    
    
  10/25/24 Range/Units    
    
  05:20     
     
Urine Color  Lt. yellow  (YELLOW)      
     
Urine Clarity  Clear  (CLEAR)      
     
Urine pH  5.5  (5.0-9.0)      
     
Ur Specific Gravity  >=1.030 A  (1.005-1.025)      
     
Urine Protein  Trace  (NEG/TRACE)  mg/dL    
     
Urine Glucose (UA)  Negative  (NEGATIVE)  mg/dL    
    
    
    
    
Urology Assessment and Plan    
Assessment and Plan    
(1) Ureteral stone with hydronephrosis:     
      Assessment and Plan:     
Multiple b/l renal and ureteral calculi, with partially obstructing left   
ureteral stone.     
NPO for Urology evaluation.     
C/w conservative measures - on IVF, Anti emetics, flomax and pain control (oral   
and IV narcotics as needed)    
(2) Multiple renal calculi:     
      Assessment and Plan:     
Multiple b/l renal and ureteral calculi, with partially obstructing left   
ureteral stone.     
NPO for Urology evaluation for left hydronephrosis.     
C/w conservative measures until seen by Urology- on IVF, Anti emetics, flomax   
and pain control (oral and IV narcotics as needed)    
    
Scheduled for OR in the evening    
(3) UTI (urinary tract infection):     
      Assessment and Plan:     
associated with and sec to nephrolithiasis. On IV rocephin. F/u urine cx.     
      Qualifiers:    
        Urinary tract infection type: acute cystitis  Hematuria presence:   
without hematuria  Qualified Code(s): N30.00 - Acute cystitis without hematuria    
(4) Hypertension:     
      Assessment and Plan:     
Poorly controlled likely due to pain and inability to take his PO medications.     
IV hydralazine as needed. Resume oral medications.     
      Qualifiers:    
        Hypertension type: primary hypertension  Qualified Code(s): I10 -   
Essential (primary) hypertension    
(5) Parkinson disease:     
      Assessment and Plan:     
Resume oral medications. Patient was considerably tremulous likely because he   
could not take his medications.     
      Qualifiers:    
        Dyskinesia presence: with dyskinesia  Fluctuating  manifestations: with   
fluctuating manifestations  Qualified Code(s): G20.B2 - Parkinson's disease with  
dyskinesia, with fluctuations    
(6) CKD stage 3a, GFR 45-59 ml/min:     
      Assessment and Plan:     
Mild elevation in serum Cr. On IVF.     
    
Plan    
d/w pt/family bedsdie    
plan o.r. now w/ b/l jj stents and possible urs/laser if urine not infected    
also hx urethral stx and will need dilation and alvarado for 1-2 days.   Family   
states he does NOT do well w/ alvarado's but they understand may need    
They also are aware may only be able to place jj stents today and then would   
need f/u definitive stone tx w/ dr tracy after infection cleared (if present)

## 2024-10-25 NOTE — CT_ITS
[address] [address] 
     [phone] 
  
  
Patient Name: 
TERRANCE FERRARA 
  
MRN: [id number]    YOB: 1935    Sex: M 
Assigned Patient Location: ER 
Current Patient Location: .[facility] 
Accession/Order Number: [id number] 
Exam Date: 10/25/2024  06:00    Report Date: 10/25/2024  06:46 
  
At the request of: 
SHAWN SANTIAGO   
  
Procedure:  CT abdomen pelvis wo con 
  
EXAMINATION: CT abdomen pelvis wo con  
  
HISTORY: LLQ pain , urinary tract infection 
  
COMPARISON: CT abdomen pelvis 5/15/2018 
  
TECHNIQUE: Axial, Coronal, and Sagittal images were obtained without and/or  
with IV contrast as indicated by examination type. Dose reduction techniques  
were achieved by using automated exposure control and/or adjustment of mA  
and/or kV according to patient size and/or use of iterative reconstruction  
technique.  
  
FINDINGS: 
LUNG BASES: No visible pulmonary or pleural disease. 
LIVER: No enlargement, atrophy, suspicious density, or significant focal  
lesion. 
BILIARY: No dilatation or calcification. 
PANCREAS: No lesion, fluid collection, or abnormal duct dilatation. 
SPLEEN: No enlargement or focal lesion. 
ADRENALS: No mass or enlargement. 
KIDNEYS: Nonobstructing 5.5 x 5.0 x 5.0 mm stone within distal right ureter at  
  
the ureterovesical junction. Mild left hydronephrosis secondary to a partially  
  
obstructing 5 mm stone within the mid left ureter. Several nonobstructing  
stones within the left kidney. Bilateral parapelvic cysts and a prominent  
benign-appearing left inferior pole cyst. 
BOWEL/MESENTERY: Prior sigmoid resection and anastomosis. No visible mass,  
obstruction, or bowel wall thickening. 
AORTA/VASCULAR: Moderate atherosclerotic disease of aorta and major branches.  
No aneurysm or dissection. 
RETROPERITONEUM: No mass or adenopathy. 
LYMPH NODES: No adenopathy. 
URINARY BLADDER: No visible focal wall thickening, lesion, or calculus. 
PELVIC ORGANS: No visible mass. Pelvic organs appropriate for patient age. 
ABDOMINAL WALL: No mass or hernia. 
BONES: No bony lesion or fracture. Multilevel degenerative disc disease and  
degenerative facet arthropathy. 
OTHER: Negative. 
  
ORDER #: [id number] CT/CT abdomen pelvis wo con  
IMPRESSION:   
   
1. Bilateral ureteral stones; nonobstructing on right, obstructing versus   
partially obstructing on left.  
2. Additional nonobstructing left kidney stones and benign-appearing cysts.  
   
   
Electronically authenticated by: PRUDENCE MOTA   Date: 10/25/2024  06:46

## 2024-10-25 NOTE — SWNOTE1
TYLER received an email from Willow at [facility] and pt is from there facility. Willow was checking to see if he was admitted or still down in ED. TYLER could see that pt was going to be coming to the floor. TYLER let Willow know that he is coming to the floor and will 
update her later.

## 2024-10-25 NOTE — ECG_ITS
[facility] 
                                        
                                       Test Date:    2024-10-25 
Pat Name:     TERRANCE FERRARA             Department:    
Patient ID:   [id number]               Room:         214-1 
Gender:       Male                     Technician:    
:          [id number]               Requested By: MACIEL MERA 
Order Number: [id number]              Reading MD:   JODIE CUNHA 
                                 Measurements 
Intervals                              Axis           
Rate:         91                       P:            78 
MI:           172                      QRS:          83 
QRSD:         90                       T:            -41 
QT:           338                                     
QTc:          416                                     
                           Interpretive Statements 
SINUS RHYTHM WITH OCCASIONAL SUPRAVENTRICULAR PREMATURE COMPLEXES 
LOW QRS VOLTAGE IN PRECORDIAL LEADS [QRS DEFLECTION < 1.0 mV IN CHEST LEADS] 
MODERATE T-WAVE ABNORMALITY, CONSIDER INFERIOR ISCHEMIA [-0.1+ mV T WAVE IN 
II/aVF] 
Compared to ECG 10/25/2024 15:26:33 
Low QRS voltage now present 
Possible ischemia now present 
T-wave abnormality still present 
Electronically Signed On 10- 18:31:35 EDT by JODIE CUNHA

## 2024-10-25 NOTE — P.HP_ITS
HPI    
H&P: HPI    
History of Present Illness    
Chief complaint: OTHER/KIDNEY STONES/ HYDRONEPHROSIS    
Narrative:     
89-year-old male with history of Parkinson, hypertension presented to ER with 1   
day history of nausea, left-sided flank pain.  He denies fever, vomiting,   
dysuria, hematuria or diarrhea.  Workup in ER revealed nonobstructing 5 mm stone  
within the distal right ureter and mild left hydronephrosis secondary to a   
partially obstructing 5 mm stone within the mid left ureter.  ER discussed   
patient's CT scan finding and clinical presentation with on-call urology who   
recommended admission for observation and possible intervention if his   
symptoms/obstruction are persistent and do not improve with conservative   
management.  At the time of my evaluation, patient was fairly comfortable with   
no active symptoms to offer except for mild nausea.  He was quite tremulous   
because he did not take his Parkinson medications in the morning because of   
nausea.  Otherwise he is at his baseline functional status.    
    
Opioid HPI    
Opioid Management    
Most Recent Pain and Opioid Data:     
    
    
                Last Pain Scale 5               10/25/24 13:06  10/25/24    
     
                          Last Pain Assessment      10/25/24 13:41    
     
                          Last MAR Pain Assessment  10/25/24 13:06    
     
                Last ORT Total Score 0               10/25/24 09:46  10/25/24    
     
                Last ORT Risk Category Low Risk        10/25/24 09:46  10/25/24    
    
    
    
Review of Systems    
    
    
ROS      
    
 Status of ROS                          10 or more systems reviewed and unremark    
able except as noted in     
history and below       
    
    
[facility]    
Medical History (Updated 10/25/24 @ 14:44 by Shaikh China MD)    
    
CKD stage 3a, GFR 45-59 ml/min    
   ?N18.31 - Chronic kidney disease, stage 3a (ICD-10)    
Hypertension    
   ?I10 - Essential (primary) hypertension (ICD-10)    
Anemia    
   ?D64.9 - Anemia, unspecified (ICD-10)    
Hard of hearing    
   ?H91.90 - Unspecified hearing loss, unspecified ear (ICD-10)    
History of external beam radiation therapy    
   ?Z92.3 - Personal history of irradiation (ICD-10)    
Urethral stricture    
   ?N35.919 - Unspecified urethral stricture, male, unspecified site (ICD-10)    
Stroke    
   ?I63.9 - Cerebral infarction, unspecified (ICD-10)    
Parkinson disease    
   ?G20.A1 - Parkinson's disease without dyskinesia, without mention of   
   fluctuations (ICD-10)    
Microscopic hematuria    
   ?R31.29 - Other microscopic hematuria (ICD-10)    
Hypertension    
   ?I10 - Essential (primary) hypertension (ICD-10)    
Hyperlipidemia    
   ?E78.5 - Hyperlipidemia, unspecified (ICD-10)    
Prostate cancer    
   ?C61 - Malignant neoplasm of prostate (ICD-10)    
Enlarged prostate    
   ?N40.0 - Benign prostatic hyperplasia without lower urinary tract symptoms   
   (ICD-10)    
    
    
Surgical History (Updated 10/22/24 @ 14:34 by Stephanie Lua RN)    
    
Hx of transurethral resection of prostate    
   ?Z98.890 - Other specified postprocedural states (ICD-10)    
   ?Z90.79 - Acquired absence of other genital organ(s) (ICD-10)    
H/O partial resection of colon    
   ?Z90.49 - Acquired absence of other specified parts of digestive tract (ICD-  
   10)    
Hx of cholecystectomy    
   ?Z90.49 - Acquired absence of other specified parts of digestive tract (ICD-  
   10)    
History of appendectomy    
   ?Z90.49 - Acquired absence of other specified parts of digestive tract (ICD-  
   10)    
S/P cystourethroscopy with dilation of urethral stricture    
   ?Z98.890 - Other specified postprocedural states (ICD-10)    
    
    
Family History (Updated 10/22/24 @ 14:34 by Stephanie Lua RN)    
Other   Family history of cancer    
    
    
    
Social History (Updated 10/24/24 @ 13:14 by Kari Rodríguez)    
Within the past year, how often did you have a drink containing alcohol:  never     
Score interpretation:  A score less than 4 is consistent with normal alcohol   
consumption.     
Smoking status:  Never smoker     
Non-prescribed substance use:  denies use     
Highest level of school completed/degree received:  high school graduate     
Little interest or pleasure in doing things:  not at all     
Feeling down, depressed, or hopeless:  not at all     
    
    
    
Meds    
Home Medications and Allergies    
                                Home Medications    
    
    
    
?Medication ?Instructions ?Recorded ?Confirmed ?Type    
     
amantadine HCl 100 mg capsule 100 mg PO BID 10/22/24 10/25/24 History    
     
amlodipine 10 mg tablet 10 mg PO DAILY 10/22/24 10/25/24 History    
     
aspirin 81 mg capsule 81 mg PO DAILY 10/22/24 10/25/24 History    
     
cholecalciferol (vitamin D3) 10 400 unit PO DAILY 10/22/24 10/25/24 History    
    
mcg (400 unit) capsule        
     
polyethylene glycol 3350 17 17 g PO DAILY 10/24/24 10/25/24 History    
    
gram/dose oral powder (Miralax)        
     
tolterodine 4 mg capsule,extended 4 mg PO DAILY 10/24/24 10/25/24 History    
    
release 24 hr        
     
carbidopa ER 25 mg-levodopa 100 mg tab PO 10/25/24  History    
    
tablet,extended release        
    
    
    
                                    Allergies    
    
    
    
Allergy/AdvReac Type Severity Reaction Status Date / Time    
     
No Known Drug Allergies Allergy   Verified 10/22/24 14:35    
    
    
    
    
Exam    
Constitutional    
Vital Signs, click to edit/add:     
                                Last Vital Signs    
    
    
    
Temp  97.6 F   10/25/24 13:15    
     
Pulse  97 H  10/25/24 13:15    
     
Resp  20   10/25/24 13:15    
     
BP  151/69 H  10/25/24 14:29    
     
Pulse Ox  94 L  10/25/24 13:15    
     
O2 Del Method  Room Air  10/25/24 13:15    
    
    
    
Documenting provider has reviewed patient's vital signs: yes    
Common normals: oriented x3    
General appearance: cooperative and frail appearing    
HENMT    
Common normals: normocephalic and head/scalp atraumatic    
Head and scalp: normocephalic and atraumatic    
Eye    
Common normals: conjunctivae normal and no scleral icterus    
Conjunctiva: conjunctiva(e) normal    
Respiratory    
Common normals: normal respiratory effort and clear to auscultation bilaterally    
Effort & inspection: able to speak in complete sentences    
Auscultation: clear to auscultation bilaterally    
Cardio    
Common normals: regular rate, S1 normal heart sound and S2 normal heart sound    
Rate: regular rate    
Heart sounds: S1 normal and S2 normal    
GI    
Common normals: Normal to inspection, nondistended, normoactive bowel sounds   
present, soft to palpation, non-tender and no hepatosplenomegaly    
Palpation: soft and no hepatosplenomegaly    
Extremity    
Common normals: no clubbing, cyanosis or edema    
Neuro    
Common normals: oriented x3, moves all extremities and no focal motor deficits    
Motor exam: tremor    
Psych    
Common normals: mental status grossly normal, denies hallucinations, denies   
homicidal ideation and denies suicidal ideation    
    
Results    
Labs    
Labs:     
                                    Short CBC    
    
    
    
  10/25/24 Range/Units    
    
  05:25     
     
WBC  11.9 H  (4.0-11.0)  10^3/uL    
     
Hgb  14.1  (14.0-18.0)  g/dL    
     
Hct  42.5  (42.0-54.0)  %    
     
Plt Count  251  (150-450)  10^3/uL    
    
    
                                       BMP    
    
    
    
  10/25/24    
    
  05:25    
     
Sodium  141    
     
Potassium  4.5    
     
Chloride  106    
     
Carbon Dioxide  23.5    
     
BUN  28.0 H    
     
Creatinine  1.66 H    
     
Glucose  143 H    
     
Calcium  9.3    
    
    
                                      Urine    
    
    
    
  10/25/24 Range/Units    
    
  05:20     
     
Urine Color  Lt. yellow  (YELLOW)      
     
Urine Clarity  Clear  (CLEAR)      
     
Urine pH  5.5  (5.0-9.0)      
     
Ur Specific Gravity  >=1.030 A  (1.005-1.025)      
     
Urine Protein  Trace  (NEG/TRACE)  mg/dL    
     
Urine Glucose (UA)  Negative  (NEGATIVE)  mg/dL    
    
    
    
    
Assessment and Plan    
Assessment and Plan    
(1) Ureteral stone with hydronephrosis:     
      Assessment and Plan:     
Multiple b/l renal and ureteral calculi, with partially obstructing left   
ureteral stone.     
NPO for Urology evaluation.     
C/w conservative measures - on IVF, Anti emetics, flomax and pain control (oral   
and IV narcotics as needed)    
(2) Multiple renal calculi:     
      Assessment and Plan:     
Multiple b/l renal and ureteral calculi, with partially obstructing left   
ureteral stone.     
NPO for Urology evaluation for left hydronephrosis.     
C/w conservative measures until seen by Urology- on IVF, Anti emetics, flomax   
and pain control (oral and IV narcotics as needed)    
    
Scheduled for OR in the evening    
(3) UTI (urinary tract infection):     
      Assessment and Plan:     
associated with and sec to nephrolithiasis. On IV rocephin. F/u urine cx.     
      Qualifiers:    
        Urinary tract infection type: acute cystitis  Hematuria presence:   
without hematuria  Qualified Code(s): N30.00 - Acute cystitis without hematuria    
(4) Hypertension:     
      Assessment and Plan:     
Poorly controlled likely due to pain and inability to take his PO medications.     
IV hydralazine as needed. Resume oral medications.     
      Qualifiers:    
        Hypertension type: primary hypertension  Qualified Code(s): I10 -   
Essential (primary) hypertension    
(5) Parkinson disease:     
      Assessment and Plan:     
Resume oral medications. Patient was considerably tremulous likely because he   
could not take his medications.     
      Qualifiers:    
        Dyskinesia presence: with dyskinesia  Fluctuating  manifestations: with   
fluctuating manifestations  Qualified Code(s): G20.B2 - Parkinson's disease with  
dyskinesia, with fluctuations    
(6) CKD stage 3a, GFR 45-59 ml/min:     
      Assessment and Plan:     
Mild elevation in serum Cr. On IVF.     
    
Plan    
c/w IVF, pain control. NPO for OR - scheduled to go this evening.     
Considering his advanced age, Parkinson, it is not unreasonable to monitor him   
overnight post operatively. Plan for discharge tomorrow provided no   
intra/perioperative complications and patient's symptoms well controlled post   
operatively

## 2024-10-25 NOTE — CM.NOTE
Medicare Outpatient Observation Notice discussed with pt, pt verbalizes understanding and pt request Case Management to sign for him (shaking d/t Parkinson's).  Original given to pt and copy placed in pt's chart.

## 2024-10-25 NOTE — XMS_ITS
Comprehensive CCD (C-CDA v2.1)  
  
                          Created on: 2024  
  
  
TERRANCE FERRARA  
External Reference #: CDR,PersonID:[id number]  
: 1935  
Sex: Male  
  
Demographics  
  
  
                                        Address             [address] [place], [place]  [address]  
   
                                        Home Phone          [phone]  
   
                                        Preferred Language  en  
   
                                        Marital Status      Single  
   
                                        Rastafarian Affiliation Unknown  
   
                                        Race                White  
   
                                        Ethnic Group        Not  or Lati  
no  
  
  
Author  
  
  
                                        Organization        [facility]  
  
  
Care Team Providers  
  
  
                                Care Team Member Name Role            Phone  
   
                                Unavailable     Primary Care Provider UnavailWILLIAM Hernández Attending       Unavailable  
   
                                BRYON ARZOLA  Primary Care    Unavailable  
   
                                Bryon Arzola  Primary Care    Unavailable  
   
                                MD Dashawn Rand Admitting       Unavailab  
MD Dashawn Kee Attending       Unavailab  
Bryon Henry  Primary Care    Unavailable  
   
                                Roger Martinez     Admitting       Unavailable  
   
                                Roger Martinez     Attending       Unavailable  
   
                                Bryon Arzola  Primary Care    Unavailable  
   
                                Ari Marte Admitting       Unavailab  
Ari Spicer Attending       Unavailab  
Bryon Henry  Primary Care    Unavailable  
   
                                Stalin Fabian    Admitting       Unavailable  
   
                                Stalin Fabian    Attending       Unavailable  
   
                                Bryon Arzola  Primary Care    Unavailable  
   
                                Fernando [employer]      Unavailable  
   
                                Gonzalo Mendez    Admitting       Unavailable  
   
                                Gonzalo Mendez    Attending       Unavailable  
   
                                Bryon Arzola  Attending       Unavailable  
   
                                Bryon Arzola  Primary Care    Unavailable  
   
                                Bryon Arzola  Attending       Unavailable  
   
                                Bryon Arzola  Primary Care    Unavailable  
   
                                Bryon Arzola  Primary Care    Unavailable  
   
                                Bryon Arzola  Attending       Unavailable  
   
                                Max Gloria Admitting       Unavailable  
   
                                Max Gloria Attending       Unavailable  
   
                                Bryon Arzola  Primary Care    Unavailable  
   
                                Bryon Arzola  Primary Care    Unavailable  
   
                                Luis Carlos Fabianun    Admitting       Unavailable  
   
                                Luis Carlos Fabianun    Attending       Unavailable  
   
                                BHAVIN EARL      Attending       Unavailable  
   
                                BRYON ARZOLA  Primary Care Physician Unavailab  
Reny Navarrete Attending       Unavailable  
   
                                Reny Blanton Attending       Unavailable  
   
                                Pantera ABDALLA Attending       Unavailable  
  
  
  
Allergies  
  
  
                                                    Allergy   
Classification                          Reported   
Allergen(s)               Allergy Type              Date of   
Onset                     Reaction(s)               Facility  
   
                                                      
(1 source)                              No Known   
Medication   
Allergies;   
Translations:   
[No Known   
Medication   
Allergies]                              Propensity to   
adverse   
reactions   
(disorder)                                                  [facility]   
Repository  
  
  
  
Medications  
Current Medications  
  
  
  
                      Medication Drug Class(es) Dates      Sig (Normalized) Sig   
(Original)  
   
                                                    amantadine   
hydrochloride 100   
mg oral tablet  
(1 source)                              Influenza A M2   
Protein Inhibitor                       Start:   
10-                                          amantadine 100   
mg Tab 100 mg =   
1 tab(s),   
Refills(s) 0   
Start Date:   
10/3/24 Status:   
Ordered  
   
                                                    amLODIPine 10 mg   
oral tablet  
(2 sources)                             Dihydropyridine   
Calcium Channel   
Blocker                                 Start:   
2020                              take 10 mg by   
mouth once daily                        amlodipine 10   
mg, Oral, Daily   
Start Date:   
20 Status:   
Ordered  
   
                                                    aspirin 81 mg oral   
tablet  
(2 sources)                             Platelet Aggregation   
Inhibitor,   
Nonsteroidal   
Anti-inflammatory   
Drug                                    Start:   
2020                              take 1 tablet by   
mouth once daily                        aspirin 81 mg   
oral tablet 81   
mg = 1 tab(s),   
Oral, Daily   
Start Date:   
20 Status:   
Ordered  
   
                                                    carbidopa 25 mg /   
levodopa 250 mg   
oral tablet  
(2 sources)                             Aromatic Amino Acid   
Decarboxylation   
Inhibitor, Aromatic   
Amino Acid                              Start:   
2020                                          carbidopa-levodo  
pa 25 mg-250 mg   
Tab 1 tab(s)   
Start Date:   
20 Status:   
Ordered  
   
                                                    cefTRIAXone 1000 mg   
injection  
(1 source)                              Cephalosporin   
Antibacterial                           Start:   
10-                              take 1 g   
intravenously once   
daily                                   cefTRIAXone 50   
mg/kg/day IVPB   
Pediatric   
Buretrol 1 gm,   
Refills(s) 0   
Start Date:   
10/3/24 Status:   
Ordered  
   
                                                    clopidogrel 75 mg   
oral tablet  
(1 source)                              P2Y12 Platelet   
Inhibitor                               Start:   
2020                              take 1 tablet by   
mouth once daily                        clopidogrel 75   
mg Tab 75 mg = 1   
tab(s), Oral,   
Daily Start   
Date: 20   
Status: Ordered  
   
                                                    lidocaine 1%   
Injection 20 mL  
(1 source)                                          Start:   
10-                              inject 0.05 g   
intravenously once                      lidocaine 1%   
Injection 20 mL   
0.05 gm, 5 mL,   
IV, Once, 5 mL,   
Refill(s) 0   
Start Date:   
10/3/24 Status:   
Ordered  
   
                                                    metoprolol tartrate   
50 mg oral tablet  
(1 source)                              beta-Adrenergic   
Blocker                                 Start:   
2020                              take 1 tablet by   
mouth once daily                        Metoprolol   
succinate 50 mg   
ER Tablet 50 mg,   
Oral, Daily   
Start Date:   
20 Status:   
Ordered  
   
                                                    nystatin 100 unt/mg   
topical powder  
(1 source)                Polyene Antifungal        Start:   
10-                                          nystatin Top   
100,000 units/g   
Pwdr 1 usman,   
Refill(s) 0   
Start Date:   
10/3/24 Status:   
Ordered  
   
                                                    Vitamin D3  
(2 sources)                                         Start:   
2020                                          Vitamin D3 400   
International_Un  
it, Daily Start   
Date: 20   
Status: Ordered  
  
  
  
Completed/Discontinued Medications  
  
  
  
                      Medication Drug Class(es) Dates      Sig (Normalized) Sig   
(Original)  
   
                                                    ciprofloxacin 500 mg   
oral tablet  
(2 sources)                             Quinolone   
Antimicrobial                           Start:   
2019                              take 1 tablet by   
mouth once daily                        Cipro 500 mg Tab   
500 mg = 1   
tab(s), Oral,   
Daily, Take 1   
tablet the day   
before the   
procedure and 1   
tablet after the   
procedure, # 2   
tab(s),   
Refills(s) 0,   
[facility]   
[facility] DANIEL TRUJILLO   
 Start Date:   
20 Status:   
Ordered  
  
  
  
Problems  
  
  
                                                    Problem   
Classification  Problem         Date            Documented Date Episodic/Chronic  
   
                                                    Acute cerebrovascular   
disease  
(2 sources)                             Cerebrovascular   
accident                                2019          Chronic  
   
                                                    Cancer of prostate  
(3 sources)                             History of malignant   
neoplasm of prostate;   
Translations:   
[Personal history of   
malignant neoplasm of   
prostate]                               Onset:   
10-                12-                Episodic  
   
                                                    Disorders of lipid   
metabolism  
(2 sources)     Hyperlipidemia                  2019      Chronic  
   
                                                    Essential   
hypertension  
(2 sources)     Hypertensive disorder                 2019      Chronic  
   
                                                    Genitourinary   
symptoms and   
ill-defined   
conditions  
(3 sources)                             Microscopic   
hematuria;   
Translations:   
[Retention of urine]                    Onset:   
10-                12-                Episodic  
   
                                                    Hyperplasia of   
prostate  
(2 sources)                             Benign prostatic   
hypertrophy with   
outflow obstruction                     2019          Chronic  
   
                                                    Other diseases of   
bladder and urethra  
(2 sources)     Urethral stricture                 2019      Episodic  
   
                                                    Other diseases of   
bladder and urethra  
(1 source)                              Male urethral   
stricture;   
Translations:   
[Unspecified urethral   
stricture, male,   
unspecified site]                       Onset:   
10-                                          Episodic  
   
                                                    Parkinson`s disease  
(2 sources)     Parkinson's disease                 2020      Chronic  
   
                                                    Residual codes;   
unclassified  
(1 source)                              Pain, unspecified;   
Translations: [Pain,   
unspecified]                            Onset:   
2024                                          Episodic  
   
                                                    Residual codes;   
unclassified  
(2 sources)                             Family history of   
prostate cancer                         2019          Episodic  
   
                                                    Urinary tract   
infections  
(1 source)                              Urinary tract   
infectious disease;   
Translations:   
[Urinary tract   
infection, site not   
specified]                              Onset:   
10-                                          Episodic  
  
  
  
Results  
  
  
                          Test Name    Value        Interpretation Reference   
[facility]                                   Facility  
   
                                                    Urology Office/Clinic Noteon  
 10-   
   
                                                    Urology   
Office/Clinic Note                      Urology Office/Clinic   
Note  
Chief Complaint  
urinary leaking and   
urge incontinence  
HPI Staff  
Previous PRW last seen   
20 for   
cystoscopy with UD   
with Oneal sounds.   
Pt states that he will   
begin leaking urine as   
soon as he gets the   
urge to go and one he   
begins leaking he is   
unable to stop. He   
wears a brief and   
changes a couple times   
daily.  
Dysuria: denies  
Incomplete bladder   
emptying: pt feels he   
is emptying  
Hematuria: denies  
Frequency: pt states   
not at all  
Urgency: yes once he   
gets the urge to void   
he will begin leaking   
in his brief  
Nocturia: 2x  
Stream: states a good   
stream doesn't feel   
that he strains  
Leaking: yes  
Post void dripping:   
yes  
Wearing pads/ Depends:   
depends and has to   
change a couple of   
times daily  
Urge incontinence: yes  
Stress incontinence:   
denies  
Incontinence without   
Sensory Awareness:   
denies  
Abdominal pain: denies  
Flank pain: denies  
Sexual complaints:   
denies  
History of Present   
Illness  
Staff HPI reviewed and   
agree.  
Review of Systems  
PHQ Score  
Initial Depression   
Screen Score: 0 SCORE  
no fever, chills,   
malaise, myalgia.  
no rash/lesions.  
no chest pain,   
palpitations, or SOB.  
no abdominal pain,   
nausea, vomiting.  
no unilateral calf   
swelling, redness,   
pain  
Physical Exam  
Vitals & Measurements  
T: 37 ?C(Temporal   
Artery) HR:   
85(Peripheral) RR: 16   
BP: 131/65  
HT: 72 in HT: 183 cm   
WT: 88 kg WT: 193.6 lb   
BMI: 26.28  
General: nontoxic,   
well-nourished,   
appears stated age  
Mouth: moist mucosa  
Lungs: normal   
respiratory effort  
Cardio: regular rate,   
good distal perfusion  
Abdomen: nondistended,   
no suprapubic   
distention or   
tenderness, no CVA   
tenderness  
Neurologic: Grossly   
normal  
Skin: No rashes or   
suspicious lesions  
Assessment/Plan  
NP referral for   
urinary retention. Pt   
resides at [facility]. Presents   
today with aide. Pt is   
previous PRW pt, has   
not been seen since   
.  
24 - BUN 22, Cre   
1.26, GFR 54, Glu 220  
1. Urinary retention   
(R33.9: Retention of   
urine, unspecified)  
UA today negative for   
blood or infection  
PVR today initially   
over 200, then then   
voided 60ml and PVR   
187ml  
IPSS 14  
Pt here today with   
complaints of   
incontinence, urgency   
and weak stream. Pt   
reports that once he   
starts urinating, he   
is unable to stop. Pt   
reports that he   
urinates about 6 times   
per day. Advised pt to   
complete timed and   
double voids. Pt is   
currently taking   
Tolterodine 4mg PO   
daily. Discussed   
potential cognitive   
side effects of this   
medication in addition   
to his Parkinson's   
diagnosis. Also   
advised patient that   
this could be   
contributing to his   
urine retention. Also   
had a long discussion   
with patient regarding   
his previous history   
of needing dilated,   
which can also be the   
cause for his weak   
stream and retention.   
Pt verbalizes   
understanding. Offered   
to stop Tolterodine   
based on these reasons   
and start   
Myrbetriq/Gemtesa. Pt   
would like to stop   
Tolterodine and not   
restart an OAB med. I   
advised pt that we can   
trial this and if he   
has worsening   
incontinence and   
urgency then the   
nursing home is to   
call us and we can   
start   
Myrbetriq/Gemtesa. Pt   
agreeable. Pt also   
agreeable to repeat   
cysto/UD with Dr. Abdalla to help his   
stream, retention and   
recurrent infections.  
Will schedule Cysto   
with UD. The procedure   
risks, benefits,   
details, and treatment   
alternatives have been   
discussed with the   
patient. These include   
bleeding, infection,   
recurrent scar in over   
50%, need for repeat   
dilation or other   
procedures, no symptom   
relief with dilation,   
among others. Full   
informed consent has   
been obtained. Will   
order Local   
anesthesia.  
-Stop Tolterodine 4mg   
PO daily  
-If patient   
experiences worsening   
OAB symptoms, will   
restart   
Myrbetriq/Gemtesa  
-Schedule cysto/UD   
with Dr. Abdalla  
-Increase fluids,   
avoid bladder   
irritants  
-Timed voids, double   
voids  
-F/U after cysto/UD  
Ordered:  
E&M of New Patient   
High 60-74 Min 61046  
  
2. Urethral stricture   
in male (N35.919:   
Unspecified urethral   
stricture, male,   
unspecified site)  
3/3/11 -   
cystourethroscopy with   
internal male   
urethrotomy  
19 cysto/UD with   
sounds  
20 cysto/UD -   
tight urethra,   
prostatic urethra has   
recurrent strictures   
at penile mid and near   
bulb, short and open.   
urethra was dilated to   
30Fr with sounds.   
Bladder moderate   
trabeculations (2+),   
no tumors or stones.   
20Fr alvarado placed, to   
be removed in office   
next day  
Pt was scheduled for   
repeat cysto/UD in   
2020 but pt   
cancelled due to pain   
from the catheter   
after cysto in 2020. Pt reported he   
was going to see   
another urologist.   
From the history I was   
able to obtain from   
patient, pt then saw   
Dr. Gloria but unsure if   
any   
procedures/dilations   
were completed.  
-See #1  
Ordered:  
E&M of New Patient   
High 60-74 Min [id number]  
  
3. Recurrent UTI   
(N39.0: Urinary tract   
infection, site not   
specified)  
24 cx - 50-100k   
E. Coli, initially   
treated with Bactrim   
then switched to   
Ceftriaxone 1gm IM   
daily (will end on   
10/5)  
Pt has had multiple ER   
visits for recurrent   
infections over the   
last year.  
-See #1  
Ordered:  
E&M of New (more   
content not   
included)...        Normal                                  [facility]  
   
                                        Comment on above:   Result Comment: Elec  
tronically Signed By: Franny MEDINA, Reny HU\.br\Date and Time Signed: 10/03/24 15:30 EDT   
   
                                                    C Bloodon 2024   
   
                                        C Blood             Corynebacterium   
species (diptheroids)  
No KALEIGH performed on   
this organism  
Probable skin   
contamination  
Results called to   
Adalid Ortega on 2S  
at 730 by TB and   
results read back for   
confirmation on 24            [facility]  
   
                                        Comment on above:   Performed By: #### 6  
[id number] ####[facility] (DEFAULT)[address]   
[place] [address]   
   
                                                    Outside Recordson 2024  
   
   
                                        Outside Records     [id number]  
01  
[id number]#1.00  
OTGTIFF             [facility]  
   
                                        Outside Records     [id number]  
40  
[id number]#1.00  
OTIFF             [facility]  
   
                                                    Coding Summaryon 2024   
   
                      Coding Summary            [facility]  
   
                                                    C Bloodon 2024   
   
                      C Blood    No growth at 5 Days [facility]  
   
                                        Comment on above:   Performed By: #### 6  
[id number] ####[facility] (DEFAULT)[address]   
[place] [address]   
   
                                                    Coding Summaryon 2024   
   
                      Coding Summary            [facility]  
   
                                                    Consent Formson 2024   
   
                                        Consent Forms       [id number]  
03  
[id number]#1.00  
OTGTIFF             [facility]  
   
                                                    Outside Recordson 2024  
   
   
                                        Outside Records     [id number]  
03  
[id number]#1.00  
[facility]  
   
                                                    Provider Orderson 2024  
   
   
                                        Provider Orders     [id number]  
03  
[id number]#1.00  
[facility]  
   
                                                    .Auto Diff 1on 2024   
   
                      Auto Mono % 14 %       High       1-12       [facility]  
   
                                        Comment on above:   Performed By: #### 1  
[id number] ####[facility] (DEFAULT)[address] [place] [address]   
   
                      Baso Abs#  0.0 x10    Normal     0.0-0.2    [facility]  
   
                                        Comment on above:   Performed By: #### 1  
[id number], [id number] ####[facility] (DEFAULT)[address] [place] [address]   
   
                                                    Basophils/100 WBC   
(Bld)           0.6 %           Normal          0.2-2.0         [facility]  
   
                                        Comment on above:   Performed By: #### 1  
[id number], [id number] ####[facility] (DEFAULT)[address] [place] [address]   
   
                      Eos Abs#   0.2 x10    Normal     0.0-0.4    [facility]  
   
                                        Comment on above:   Performed By: #### 1  
[id number], [id number] ####[facility] (DEFAULT)[address] [place] [address]   
   
                                                    Eosinophils/100 WBC   
(Bld)           3.8 %           Normal          0.9-4.0         [facility]  
   
                                        Comment on above:   Performed By: #### 1  
[id number] ####[facility] (DEFAULT)[address] [place] [address]   
   
                      Lymph Abs# 0.9 x10    Low        1.3-2.9    [facility]  
   
                                        Comment on above:   Performed By: #### 1  
[id number], [id number] ####[facility] (DEFAULT)[address] [place] [address]   
   
                                                    Lymphocytes/100 WBC   
(Bld)           18 %            Normal          14-48           [facility]  
   
                                        Comment on above:   Performed By: #### 1  
[id number], [id number] ####[facility] (DEFAULT)[address] [place] [address]   
   
                      Mono Abs#  0.7 x10    Normal     0.0-0.8    [facility]  
   
                                        Comment on above:   Performed By: #### 1  
[id number], [id number] ####[facility] (DEFAULT)[address] [place] [address]   
   
                      Neut Abs#  3.3 x10    Normal     1.5-9.2    [facility]  
   
                                        Comment on above:   Performed By: #### 1  
[id number], [id number] ####[facility] (DEFAULT)[address] [place] [address]   
   
                                                    Neutrophils/100 WBC   
(Bld)           64 %            Normal          44-88           [facility]  
   
                                        Comment on above:   Performed By: #### 1  
[id number], [id number] ####[facility] (DEFAULT)[address] [place] [address]   
   
                                                    BMP Standardon 2024   
   
                                eGFR Non AA     55 mL/min/1.73m2 Invalid   
Interpretation   
Code                                                [facility]  
   
                                        Comment on above:   Performed By: #### 1  
[id number], [id number], [id number] ####[facility] (DEFAULT)[address] [place] [address]   
   
                                eGFR AA         >60             Invalid   
Interpretation   
Code                                                [facility]  
   
                                        Comment on above:   Performed By: #### 1  
[id number], [id number], [id number] ####[facility] (DEFAULT)[address] [place] [address]   
   
                                                    Anion gap   
[Moles/Vol]     11.1 mmol/L     Normal          5.0-19.0        [facility]  
   
                                        Comment on above:   Performed By: #### 1  
[id number], [id number], [id number] ####[facility] (DEFAULT)[address] [place] [address]   
   
                      Calcium [Mass/Vol] 8.7 mg/dL  Low        8.9-10.3   [facility]  
   
                                        Comment on above:   Performed By: #### 1  
[id number], 3084745, [id number] ####[facility] (DEFAULT)[address] [place] [address]   
   
                      Chloride [Moles/Vol] 106 mmol/L Normal     101-111    [facility]  
   
                                        Comment on above:   Performed By: #### 1  
[id number], [id number], [id number] ####[facility] (DEFAULT)[address] [place] [address]   
   
                      CO2 [Moles/Vol] 28 mmol/L  Normal     21-32      [facility]  
   
                                        Comment on above:   Performed By: #### 1  
[id number], 6307383, 77968565 ####[facility] (DEFAULT)[address] [place] [address]   
   
                                                    Creatinine   
[Mass/Vol]      1.24 mg/dL      Normal          0.90-1.30       [facility]  
   
                                        Comment on above:   Performed By: #### 1  
[id number], 7369234, 25561828 ####[facility] (DEFAULT)[address] [place] [address]   
   
                      Glucose [Mass/Vol] 117.0 mg/dL Normal     74.0-118.0 [facility]  
   
                                        Comment on above:   Performed By: #### 1  
[id number], 8101255, 10208143 ####[facility] (DEFAULT)[address] [place] [address]   
   
                                Osmolality      288 mOsm/L      Invalid   
Interpretation   
Code                                                [facility]  
   
                                        Comment on above:   Performed By: #### 1  
[id number], 0360287, [id number] ####[facility] (DEFAULT)[address] [place] [address]   
   
                                                    Potassium   
[Moles/Vol]     4.1 mmol/L      Normal          3.6-5.1         [facility]  
   
                                        Comment on above:   Performed By: #### 1  
[id number], 8320338, [id number] ####[facility] (DEFAULT)[address] [place] [address]   
   
                      Sodium [Moles/Vol] 141.0 mmol/L Normal     136.0-144.0 [facility]  
   
                                        Comment on above:   Performed By: #### 1  
[id number], 0426596, [id number] ####[facility] (DEFAULT)[address] [place] [address]   
   
                                                    Urea nitrogen   
[Mass/Vol]      28 mg/dL        High            8-26            [facility]  
   
                                        Comment on above:   Performed By: #### 1  
[id number], 6541681, [id number] ####[facility] (DEFAULT)[address] [place] [address]   
   
                                                    Urea   
nitrogen/Creatinine   
[Mass ratio]    22.5 mg/mg      High            4.6-16.2        [facility]  
   
                                        Comment on above:   Performed By: #### 1  
[id number], 7348674, 30034482 ####[facility] (DEFAULT)[address] [place] [address]   
   
                                                    CBC w/ Auto Diffon    
   
                                                    Erythrocyte   
distribution width   
(RBC) [Ratio]   15.5 %          High            11.5-15.0       [facility]  
   
                                        Comment on above:   Performed By: #### 1  
[id number], 2776326, 09618937 ####[facility] (DEFAULT)[address] [place] [address]   
   
                                                    Hematocrit (Bld)   
[Volume fraction] 37.2 %          Normal          34.8-51.9       [facility]  
   
                                        Comment on above:   Performed By: #### 1  
[id number], 8233638, 06169559 ####[facility] (DEFAULT)[address] [place] [address]   
   
                                                    Hemoglobin (Bld)   
[Mass/Vol]      12.5 g/dL       Normal          11.8-17.7       [facility]  
   
                                        Comment on above:   Performed By: #### 1  
[id number], 5415357, 13144597 ####[facility] (DEFAULT)[address] [place] [address]   
   
                                Man Diff?       Auto            Invalid   
Interpretation   
Code                                                [facility]  
   
                                        Comment on above:   Performed By: #### 1  
[id number], [id number], 03175225 ####[facility] (DEFAULT)[address] [place] [address]   
   
                                                    MCH (RBC) [Entitic   
mass]           30 pg           Normal          24-34           [facility]  
   
                                        Comment on above:   Performed By: #### 1  
[id number], 2413368, 46813289 ####[facility] (DEFAULT)[address] [place] [address]   
   
                                                    MCHC (RBC)   
[Mass/Vol]      34 g/dL         Normal          26-37           [facility]  
   
                                        Comment on above:   Performed By: #### 1  
[id number], 0808600, 52659883 ####[facility] (DEFAULT)[address] [place] [address]   
   
                                                    MCV (RBC) [Entitic   
vol]            89 fL           Normal                    [facility]  
   
                                        Comment on above:   Performed By: #### 1  
[id number], 4237968, 07904270 ####[facility] (DEFAULT)[address] [place] [address]   
   
                      Platelet   177 x10    Normal     138-427    [facility]  
   
                                        Comment on above:   Performed By: #### 1  
[id number], 9341134, 61394862 ####[facility] (DEFAULT)[address] [place] [address]   
   
                                                    Platelet mean volume   
(Bld) [Entitic vol] 8.2 fL          Normal          6.3-10.2        [facility]  
   
                                        Comment on above:   Performed By: #### 1  
[id number], 3567100, 61253607 ####[facility] (DEFAULT)[address] [place] [address]   
   
                      RBC        4.19 x10   Normal     3.70-5.30  [facility]  
   
                                        Comment on above:   Performed By: #### 1  
[id number], 0236745, 71582361 ####[facility] (DEFAULT)[address] [place] [address]   
   
                      WBC        5.2 x10    Normal     3.5-10.5   [facility]  
   
                                        Comment on above:   Performed By: #### 1  
[id number], 3077177, 66920261 ####[facility] (DEFAULT)[address] [place] [address]   
   
                                                    Discharge Noteon 2024   
   
                      Discharge Note            Normal                [facility]  
   
                                                    Education Noteon 2024   
   
                      Education Note            Normal                [facility]  
   
                                                    Inpatient Clinical Summaryon  
 2024   
   
                                                    Inpatient Clinical   
Summary                         Normal                          [facility]  
   
                                                    Inpatient Patient Summaryon   
2024   
   
                                                    Inpatient Patient   
Summary                         Normal                          [facility]  
   
                                                    Pharmacy Noteon 2024   
   
                      Pharmacy Note            Normal                [facility]  
   
                                                    Progress Note - Nurseon    
   
                                                    Progress Note -   
Nurse                                   report called to   
noemy pa at [place],   
[phone].  
[Electronically Signed   
on: 2024 14:08   
EDT]  
______________________  
__________________  
Vandana Quijano RN  
  
  
  
  
[Verified on:   
2024 14:08 EDT]  
______________________  
__________________  
Vandana Quijano RN       Normal                                  [facility]  
   
                                                    .Auto Diff 12024   
   
                      Auto Mono % 10 %       Normal     1-12       [facility]  
   
                                        Comment on above:   Performed By: #### 1  
[id number], [id number], 4266982 ####[facility] (DEFAULT)[address] [place] [address]   
   
                      Baso Abs#  0.0 x10    Normal     0.0-0.2    [facility]  
   
                                        Comment on above:   Performed By: #### 1  
[id number], [id number], 0057016 ####[facility] (DEFAULT)[address] [place] [address]   
   
                                                    Basophils/100 WBC   
(Bld)           0.3 %           Normal          0.2-2.0         [facility]  
   
                                        Comment on above:   Performed By: #### 1  
[id number], [id number], 7693138 ####[facility] (DEFAULT)[address] [place] [address]   
   
                      Eos Abs#   0.1 x10    Normal     0.0-0.4    [facility]  
   
                                        Comment on above:   Performed By: #### 1  
[id number], [id number], 5503186 ####[facility] (DEFAULT)[address] [place] [address]   
   
                                                    Eosinophils/100 WBC   
(Bld)           1.4 %           Normal          0.9-4.0         [facility]  
   
                                        Comment on above:   Performed By: #### 1  
[id number], [id number], 8128127 ####[facility] (DEFAULT)[address] [place] [address]   
   
                      Lymph Abs# 1.0 x10    Low        1.3-2.9    [facility]  
   
                                        Comment on above:   Performed By: #### 1  
[id number], [id number], 1714081 ####[facility] (DEFAULT)[address] [place] [address]   
   
                                                    Lymphocytes/100 WBC   
(Bld)           14 %            Normal          14-48           [facility]  
   
                                        Comment on above:   Performed By: #### 1  
[id number], [id number], 9543687 ####[facility] (DEFAULT)[address] [place] [address]   
   
                      Mono Abs#  0.7 x10    Normal     0.0-0.8    [facility]  
   
                                        Comment on above:   Performed By: #### 1  
[id number], [id number], 5887413 ####[facility] (DEFAULT)[address] [place] [address]   
   
                      Neut Abs#  5.5 x10    Normal     1.5-9.2    [facility]  
   
                                        Comment on above:   Performed By: #### 1  
[id number], [id number], 0423136 ####[facility] (DEFAULT)[address] [place] [address]   
   
                                                    Neutrophils/100 WBC   
(Bld)           75 %            Normal          44-88           [facility]  
   
                                        Comment on above:   Performed By: #### 1  
[id number], 3602659146, 8993554 ####[facility] (DEFAULT)[address] [place] [address]   
   
                                                    BMP Standardon 2024   
   
                                eGFR Non AA     56 mL/min/1.73m2 Invalid   
Interpretation   
Code                                                [facility]  
   
                                        Comment on above:   Performed By: #### 1  
[id number], [id number], 5060762 ####[facility] (DEFAULT)[address] [place] [address]   
   
                                eGFR AA         >60             Invalid   
Interpretation   
Code                                                [facility]  
   
                                        Comment on above:   Performed By: #### 1  
[id number], [id number], 3124594 ####[facility] (DEFAULT)[address] [place] [address]   
   
                                                    Anion gap   
[Moles/Vol]     10.0 mmol/L     Normal          5.0-19.0        [facility]  
   
                                        Comment on above:   Performed By: #### 1  
[id number], [id number], 3578738 ####[facility] (DEFAULT)[address] [place] [address]   
   
                      Calcium [Mass/Vol] 9.1 mg/dL  Normal     8.9-10.3   [facility]  
   
                                        Comment on above:   Performed By: #### 1  
7432449, 9162945212, 0074754 ####[facility] (DEFAULT)[address] [place] [address]   
   
                      Chloride [Moles/Vol] 107 mmol/L Normal     101-111    [facility]  
   
                                        Comment on above:   Performed By: #### 1  
1329356, 2578485591, 4081620 ####[facility] (DEFAULT)[address] [place] [address]   
   
                      CO2 [Moles/Vol] 25 mmol/L  Normal     21-32      [facility]  
   
                                        Comment on above:   Performed By: #### 1  
[id number], 8815103397, 5108956 ####[facility] (DEFAULT)[address] [place] [address]   
   
                                                    Creatinine   
[Mass/Vol]      1.23 mg/dL      Normal          0.90-1.30       [facility]  
   
                                        Comment on above:   Performed By: #### 1  
[id number], 4584692587, 5686095 ####[facility] (DEFAULT)[address] [place] [address]   
   
                      Glucose [Mass/Vol] 126.0 mg/dL High       74.0-118.0 [facility]  
   
                                        Comment on above:   Performed By: #### 1  
[id number], 7413071452, 6519238 ####[facility] (DEFAULT)[address] [place] [address]   
   
                                Osmolality      282 mOsm/L      Invalid   
Interpretation   
Code                                                [facility]  
   
                                        Comment on above:   Performed By: #### 1  
8463706, [id number], 2516487 ####[facility] (DEFAULT)[address] [place] [address]   
   
                                                    Potassium   
[Moles/Vol]     4.0 mmol/L      Normal          3.6-5.1         [facility]  
   
                                        Comment on above:   Performed By: #### 1  
[id number], [id number], 0166997 ####[facility] (DEFAULT)[address] [place] [address]   
   
                      Sodium [Moles/Vol] 138.0 mmol/L Normal     136.0-144.0 [facility]  
   
                                        Comment on above:   Performed By: #### 1  
4723122, 4881621120, 8063000 ####[facility] (DEFAULT)[address] [place] [address]   
   
                                                    Urea nitrogen   
[Mass/Vol]      26 mg/dL        Normal          8-26            [facility]  
   
                                        Comment on above:   Performed By: #### 1  
7603821, [id number], 4941271 ####[facility] (DEFAULT)[address] [place] [address]   
   
                                                    Urea   
nitrogen/Creatinine   
[Mass ratio]    21.1 mg/mg      High            4.6-16.2        [facility]  
   
                                        Comment on above:   Performed By: #### 1  
[id number], [id number], 7947042 ####[facility] (DEFAULT)[address] [place] [address]   
   
                                                    CBC w/ Auto Diffon   
4   
   
                                                    Erythrocyte   
distribution width   
(RBC) [Ratio]   15.2 %          High            11.5-15.0       [facility]  
   
                                        Comment on above:   Performed By: #### 1  
[id number], [id number], 3449998 ####[facility] (DEFAULT)[address] [place] [address]   
   
                                                    Hematocrit (Bld)   
[Volume fraction] 39.0 %          Normal          34.8-51.9       [facility]  
   
                                        Comment on above:   Performed By: #### 1  
[id number], [id number], 8525036 ####[facility] (DEFAULT)[address] [place], [place] [address]   
   
                                                    Hemoglobin (Bld)   
[Mass/Vol]      13.1 g/dL       Normal          11.8-17.7       [facility]  
   
                                        Comment on above:   Performed By: #### 1  
2858946, [id number], 9904913 ####[facility] (DEFAULT)[address] [place], [place] [address]   
   
                                Man Diff?       Auto            Invalid   
Interpretation   
Code                                                [facility]  
   
                                        Comment on above:   Performed By: #### 1  
[id number], [id number], 8614650 ####[facility] (DEFAULT)[address] [place] [address]   
   
                                                    MCH (RBC) [Entitic   
mass]           30 pg           Normal          24-34           [facility]  
   
                                        Comment on above:   Performed By: #### 1  
[id number], [id number], 4034234 ####[facility] (DEFAULT)[address] [place] [address]   
   
                                                    MCHC (RBC)   
[Mass/Vol]      34 g/dL         Normal          26-37           [facility]  
   
                                        Comment on above:   Performed By: #### 1  
7290091, 1391658800, 0960740 ####[facility] (DEFAULT)[address] [place] [address]   
   
                                                    MCV (RBC) [Entitic   
vol]            90 fL           Normal                    [facility]  
   
                                        Comment on above:   Performed By: #### 1  
[id number], [id number], 2650237 ####[facility] (DEFAULT)[address] [place] [address]   
   
                      Platelet   187 x10    Normal     138-427    [facility]  
   
                                        Comment on above:   Performed By: #### 1  
[id number], [id number], 8792223 ####[facility] (DEFAULT)[address] [place] [address]   
   
                                                    Platelet mean volume   
(Bld) [Entitic vol] 8.1 fL          Normal          6.3-10.2        [facility]  
   
                                        Comment on above:   Performed By: #### 1  
[id number], [id number], 2688495 ####[facility] (DEFAULT)[address] [place] [address]   
   
                      RBC        4.35 x10   Normal     3.70-5.30  [facility]  
   
                                        Comment on above:   Performed By: #### 1  
0524657, [id number], 8107637 ####[facility] (DEFAULT)[address] [place] [address]   
   
                      WBC        7.3 x10    Normal     3.5-10.5   [facility]  
   
                                        Comment on above:   Performed By: #### 1  
[id number], [id number], 7692825 ####[facility] (DEFAULT)[address] [place] [address]   
   
                                                    Nutrition Noteon 2024   
   
                                        Nutrition Note      note po intake has   
been good - 100% most   
meals; will continue   
to monitor po and   
assist with any   
changes prn. ts     Normal                                  [facility]  
   
                                                    RPR, Rfx Qn RPR/Confirm TP L  
Con 2024   
   
                                RPR LC          Non-Reactive    Invalid   
Interpretation   
Code                      Non Reactive              [facility]  
   
                                        Comment on above:   Result Comment: Perf  
ormed At: [facility] [id number] [place], [place] [id number] Vincent PhD Ph:[phone]   
   
                                                            Performed By: #### 1  
[id number], [id number], 2766081, 3803250,   
[id number] ####[facility] (DEFAULT)[address] [place] [address]   
   
                                                    .Auto Diff 1on 2024   
   
                      Auto Mono % 9 %        Normal     1-12       [facility]  
   
                                        Comment on above:   Performed By: #### 1  
[id number], [id number], 6472435, 0795412,   
[id number] ####[facility] (DEFAULT)[address] [place] [address]   
   
                      Baso Abs#  0.0 x10    Normal     0.0-0.2    [facility]  
   
                                        Comment on above:   Performed By: #### 1  
4565535, [id number], 9042032, 0830668,   
[id number] ####[facility] (DEFAULT)[address] [place] [address]   
   
                                                    Basophils/100 WBC   
(Bld)           0.4 %           Normal          0.2-2.0         [facility]  
   
                                        Comment on above:   Performed By: #### 1  
[id number], [id number], 4151696, 5996049,   
[id number] ####[facility] (DEFAULT)[address] [place] [address]   
   
                      Eos Abs#   0.1 x10    Normal     0.0-0.4    [facility]  
   
                                        Comment on above:   Performed By: #### 1  
[id number], [id number], 7862723, 5179448,   
[id number] ####[facility] (DEFAULT)[address] [place] [address]   
   
                                                    Eosinophils/100 WBC   
(Bld)           1.6 %           Normal          0.9-4.0         [facility]  
   
                                        Comment on above:   Performed By: #### 1  
[id number], 1152841905, 8301792, 7664445,   
[id number] ####[facility] (DEFAULT)[address] [place] [address]   
   
                      Lymph Abs# 0.7 x10    Low        1.3-2.9    [facility]  
   
                                        Comment on above:   Performed By: #### 1  
[id number], [id number], 2888004, 0932214,   
[id number] ####[facility] (DEFAULT)[address] [place] [address]   
   
                                                    Lymphocytes/100 WBC   
(Bld)           12 %            Low             14-48           [facility]  
   
                                        Comment on above:   Performed By: #### 1  
[id number], 7751593456, 3790516, 9585512,   
[id number] ####[facility] (DEFAULT)[address] [place] [address]   
   
                      Mono Abs#  0.5 x10    Normal     0.0-0.8    [facility]  
   
                                        Comment on above:   Performed By: #### 1  
[id number], [id number], 0357497, 4826958,   
[id number] ####[facility] (DEFAULT)[address] [place] [address]   
   
                      Neut Abs#  4.7 x10    Normal     1.5-9.2    [facility]  
   
                                        Comment on above:   Performed By: #### 1  
5626980, 0713250151, 0097206, 5470032,   
[id number] ####[facility] (DEFAULT)[address] [place] [address]   
   
                                                    Neutrophils/100 WBC   
(Bld)           77 %            Normal          44-88           [facility]  
   
                                        Comment on above:   Performed By: #### 1  
0598072, [id number], 7056557, 2206683,   
[id number] ####[facility] (DEFAULT)[address] [place] [address] Standardon 2024   
   
                                eGFR Non AA     >60             Invalid   
Interpretation   
Code                                                [facility]  
   
                                        Comment on above:   Performed By: #### 1  
[id number], [id number], 1232943, 4060603,   
[id number] ####[facility] (DEFAULT)[address] [place] [address]   
   
                                eGFR AA         >60             Invalid   
Interpretation   
Code                                                [facility]  
   
                                        Comment on above:   Performed By: #### 1  
2821382, [id number], 4454419, 6666104,   
[id number] ####[facility] (DEFAULT)[address] [place] [address]   
   
                                                    Anion gap   
[Moles/Vol]     10.9 mmol/L     Normal          5.0-19.0        [facility]  
   
                                        Comment on above:   Performed By: #### 1  
3756858, 8109880037, 9303068, 3883718,   
[id number] ####[facility] (DEFAULT)[address] [place] [address]   
   
                      Calcium [Mass/Vol] 8.8 mg/dL  Low        8.9-10.3   [facility]  
   
                                        Comment on above:   Performed By: #### 1  
7443839, 0206403249, 4739927, 6730882,   
[id number] ####[facility] (DEFAULT)[address] [place] [address]   
   
                      Chloride [Moles/Vol] 108 mmol/L Normal     101-111    [facility]  
   
                                        Comment on above:   Performed By: #### 1  
3505576, 6164261715, 8301931, 3443966,   
[id number] ####[facility] (DEFAULT)[address] [place] [address]   
   
                      CO2 [Moles/Vol] 25 mmol/L  Normal     21-32      [facility]  
   
                                        Comment on above:   Performed By: #### 1  
2979089, 0985946222, 7738363, 9159443,   
[id number] ####[facility] (DEFAULT)[address] [place] [address]   
   
                                                    Creatinine   
[Mass/Vol]      1.11 mg/dL      Normal          0.90-1.30       [facility]  
   
                                        Comment on above:   Performed By: #### 1  
2204400, 4355863546, 7059875, 5598916,   
[id number] ####[facility] (DEFAULT)[address] [place] [address]   
   
                      Glucose [Mass/Vol] 123.0 mg/dL High       74.0-118.0 [facility]  
   
                                        Comment on above:   Performed By: #### 1  
8010796, 1664698314, 9301141, 1756754,   
[id number] ####[facility] (DEFAULT)[address] [place] [address]   
   
                                Osmolality      286 mOsm/L      Invalid   
Interpretation   
Code                                                [facility]  
   
                                        Comment on above:   Performed By: #### 1  
3213228, 6431771509, 8319777, 9204173,   
[id number] ####[facility] (DEFAULT)[address] [place] [address]   
   
                                                    Potassium   
[Moles/Vol]     3.9 mmol/L      Normal          3.6-5.1         [facility]  
   
                                        Comment on above:   Performed By: #### 1  
6301516, 8718064005, 1980751, 6962495,   
[id number] ####[facility] (DEFAULT)[address] [place] [address]   
   
                      Sodium [Moles/Vol] 140.0 mmol/L Normal     136.0-144.0 [facility]  
   
                                        Comment on above:   Performed By: #### 1  
0210892, 6375816720, 7429565, 1754512,   
[id number] ####[facility] (DEFAULT)[address] [place] [address]   
   
                                                    Urea nitrogen   
[Mass/Vol]      27 mg/dL        High            8-26            [facility]  
   
                                        Comment on above:   Performed By: #### 1  
7727544, 2753883149, 4766633, 0191757,   
[id number] ####[facility] (DEFAULT)[address] [place] [address]   
   
                                                    Urea   
nitrogen/Creatinine   
[Mass ratio]    24.3 mg/mg      High            4.6-16.2        [facility]  
   
                                        Comment on above:   Performed By: #### 1  
7958238, 7953782230, 1965305, 2534697,   
[id number] ####[facility] (DEFAULT)[address] [place] [address]   
   
                                                    CBC w/ Auto Diffon    
   
                                                    Erythrocyte   
distribution width   
(RBC) [Ratio]   14.9 %          Normal          11.5-15.0       [facility]  
   
                                        Comment on above:   Performed By: #### 1  
8845626, 4960817598, 9807952, 4416994,   
[id number] ####[facility] (DEFAULT)[address] [place] [address]   
   
                                                    Hematocrit (Bld)   
[Volume fraction] 39.2 %          Normal          34.8-51.9       [facility]  
   
                                        Comment on above:   Performed By: #### 1  
9183395, 2739566136, 5199722, 7243381,   
[id number] ####[facility] (DEFAULT)[address] [place] [address]   
   
                                                    Hemoglobin (Bld)   
[Mass/Vol]      13.2 g/dL       Normal          11.8-17.7       [facility]  
   
                                        Comment on above:   Performed By: #### 1  
3542804, 2776198344, 1407779, 4499301,   
[id number] ####[facility] (DEFAULT)[address] [place] [address]   
   
                                Man Diff?       Auto            Invalid   
Interpretation   
Code                                                [facility]  
   
                                        Comment on above:   Performed By: #### 1  
4981320, 5602690439, 8470647, 6478393,   
[id number] ####[facility] (DEFAULT)[address] [place] [address]   
   
                                                    MCH (RBC) [Entitic   
mass]           30 pg           Normal          24-34           [facility]  
   
                                        Comment on above:   Performed By: #### 1  
6995402, 2031838536, 3282049, 2994358,   
[id number] ####[facility] (DEFAULT)[address] [place] [address]   
   
                                                    MCHC (RBC)   
[Mass/Vol]      34 g/dL         Normal          26-37           [facility]  
   
                                        Comment on above:   Performed By: #### 1  
1162875, 2062180032, 8309570, 1970918,   
[id number] ####[facility] (DEFAULT)[address] [place] [address]   
   
                                                    MCV (RBC) [Entitic   
vol]            88 fL           Normal                    [facility]  
   
                                        Comment on above:   Performed By: #### 1  
7988049, 1101306846, 9945007, 5614938,   
[id number] ####[facility] (DEFAULT)[address] [place] [address]   
   
                      Platelet   181 x10    Normal     138-427    [facility]  
   
                                        Comment on above:   Performed By: #### 1  
5985500, 7262250460, 1971142, 5673444,   
[id number] ####[facility] (DEFAULT)[address] [place] [address]   
   
                                                    Platelet mean volume   
(Bld) [Entitic vol] 8.1 fL          Normal          6.3-10.2        [facility]  
   
                                        Comment on above:   Performed By: #### 1  
4333433, 1387421925, 6204339, 0828595,   
[id number] ####[facility] (DEFAULT)[address] [place] [address]   
   
                      RBC        4.46 x10   Normal     3.70-5.30  [facility]  
   
                                        Comment on above:   Performed By: #### 1  
5336026, 1202360216, 6990076, 8631227,   
[id number] ####[facility] (DEFAULT)[address] [place] [address]   
   
                      WBC        6.0 x10    Normal     3.5-10.5   [facility]  
   
                                        Comment on above:   Performed By: #### 1  
2998237, 2456897244, 6513002, 2336051,   
[id number] ####[facility] (DEFAULT)[address] [place] [address]   
   
                                                    CRPon 2024   
   
                      CRP [Mass/Vol] mg/L       Normal     <=0.5      [facility]  
   
                                        Comment on above:   Performed By: #### 1  
6054380, 5328562181, 5984612, 6764809,   
[id number] ####[facility] (DEFAULT)[address] [place] [address]   
   
                                                    .Auto Diff 1on 2024   
   
                      Auto Mono % 10 %       Normal     1-12       [facility]  
   
                                        Comment on above:   Performed By: #### 1  
436694781, 50046933, 0464896 ####[facility] (DEFAULT)[address] [place] [address]   
   
                      Baso Abs#  0.0 x10    Normal     0.0-0.2    [facility]  
   
                                        Comment on above:   Performed By: #### 1  
020560326, 58854374, 7025452 ####[facility] (DEFAULT)[address] [place] [address]   
   
                                                    Basophils/100 WBC   
(Bld)           0.8 %           Normal          0.2-2.0         [facility]  
   
                                        Comment on above:   Performed By: #### 1  
024395033, 41921444, 2220938 ####[facility] (DEFAULT)[address] [place] [address] 7[address]   
   
                      Eos Abs#   0.1 x10    Normal     0.0-0.4    [facility]  
   
                                        Comment on above:   Performed By: #### 1  
302400399, 06952327, 9691594 ####[facility] (DEFAULT)[address] [place] [address]   
   
                                                    Eosinophils/100 WBC   
(Bld)           2.4 %           Normal          0.9-4.0         [facility]  
   
                                        Comment on above:   Performed By: #### 1  
241343898, 34865112, 6522257 ####[facility] (DEFAULT)[address] [place] [address]   
   
                      Lymph Abs# 0.9 x10    Low        1.3-2.9    [facility]  
   
                                        Comment on above:   Performed By: #### 1  
[id number], 18375052, 0804883 ####[facility] (DEFAULT)[address] [place] [address] 024[address]   
   
                                                    Lymphocytes/100 WBC   
(Bld)           17 %            Normal          14-48           [facility]  
   
                                        Comment on above:   Performed By: #### 1  
[id number], 26663541, 7031240 ####[facility] (DEFAULT)[address] [place] [address]23   
   
                      Mono Abs#  0.5 x10    Normal     0.0-0.8    [facility]  
   
                                        Comment on above:   Performed By: #### 1  
[id number], 05299257, 8825022 ####[facility] (DEFAULT)[address] [place] [address] 91034   
   
                      Neut Abs#  3.7 x10    Normal     1.5-9.2    [facility]  
   
                                        Comment on above:   Performed By: #### 1  
[id number], 59907209, 3602697 ####[facility] (DEFAULT)[address] [place] [address]   
   
                                                    Neutrophils/100 WBC   
(Bld)           70 %            Normal          44-88           [facility]  
   
                                        Comment on above:   Performed By: #### 1  
[id number], 39356313, 4867511 ####[facility] (DEFAULT)[address] [place] [address]   
   
                                                    BMP Standardon 2024   
   
                                eGFR Non AA     57 mL/min/1.73m2 Invalid   
Interpretation   
Code                                                [facility]  
   
                                        Comment on above:   Performed By: #### 1  
[id number], 97940521, 0991547 ####[facility] (DEFAULT)[address] [place] [address] 96252   
   
                                eGFR AA         >60             Invalid   
Interpretation   
Code                                                [facility]  
   
                                        Comment on above:   Performed By: #### 1  
[id number], 13109977, 8076051 ####[facility] (DEFAULT)[address] [place] [address] 12140   
   
                                                    Anion gap   
[Moles/Vol]     9.0 mmol/L      Normal          5.0-19.0        [facility]  
   
                                        Comment on above:   Performed By: #### 1  
620152296, 41774877, 1565113 ####[facility] (DEFAULT)[address] [place] [address]661   
   
                      Calcium [Mass/Vol] 8.6 mg/dL  Low        8.9-10.3   [facility]  
   
                                        Comment on above:   Performed By: #### 1  
765961447, 16580678, 8262836 ####[facility] (DEFAULT)[address] [place] [address] 20041   
   
                      Chloride [Moles/Vol] 110 mmol/L Normal     101-111    [facility]  
   
                                        Comment on above:   Performed By: #### 1  
039081013, 04565326, 1249494 ####[facility] (DEFAULT)[address] [place] [address] 71847   
   
                      CO2 [Moles/Vol] 25 mmol/L  Normal     21-32      [facility]  
   
                                        Comment on above:   Performed By: #### 1  
[id number], 06127791, 9704563 ####[facility] (DEFAULT)[address] [place] [address] 9[address]60   
   
                                                    Creatinine   
[Mass/Vol]      1.20 mg/dL      Normal          0.90-1.30       [facility]  
   
                                        Comment on above:   Performed By: #### 1  
[id number], 55220529, 6887938 ####[facility] (DEFAULT)[address] [place] [address] 79795   
   
                      Glucose [Mass/Vol] 116.0 mg/dL Normal     74.0-118.0 [facility]  
   
                                        Comment on above:   Performed By: #### 1  
574292313, 69542573, 0439244 ####[facility] (DEFAULT)[address] [place] [address]   
   
                                Osmolality      285 mOsm/L      Invalid   
Interpretation   
Code                                                [facility]  
   
                                        Comment on above:   Performed By: #### 1  
[id number], 29987957, 3999371 ####[facility] (DEFAULT)[address] [place] [address] 840[address]   
   
                                                    Potassium   
[Moles/Vol]     4.0 mmol/L      Normal          3.6-5.1         [facility]  
   
                                        Comment on above:   Performed By: #### 1  
[id number], 18804546, 6938986 ####[facility] (DEFAULT)[address] [place] [address]   
   
                      Sodium [Moles/Vol] 140.0 mmol/L Normal     136.0-144.0 [facility]  
   
                                        Comment on above:   Performed By: #### 1  
125170394, 11036061, 8308317 ####[facility] (DEFAULT)[address] [place] [address]   
   
                                                    Urea nitrogen   
[Mass/Vol]      27 mg/dL        High            8-26            [facility]  
   
                                        Comment on above:   Performed By: #### 1  
[id number], 62803332, 5179356 ####[facility] (DEFAULT)[address] [place] [address]11   
   
                                                    Urea   
nitrogen/Creatinine   
[Mass ratio]    22.5 mg/mg      High            4.6-16.2        [facility]  
   
                                        Comment on above:   Performed By: #### 1  
[id number], 55632621, 3809299 ####[facility] (DEFAULT)[address] [place] [address]   
   
                                                    CBC w/ Auto Diffon    
   
                                                    Erythrocyte   
distribution width   
(RBC) [Ratio]   14.8 %          Normal          11.5-15.0       [facility]  
   
                                        Comment on above:   Performed By: #### 1  
[id number], 69987448, 1433080 ####[facility] (DEFAULT)[address] [place] [address]   
   
                                                    Hematocrit (Bld)   
[Volume fraction] 39.1 %          Normal          34.8-51.9       [facility]  
   
                                        Comment on above:   Performed By: #### 1  
[id number], 71594466, 9351086 ####[facility] (DEFAULT)[address] [place] [address]   
   
                                                    Hemoglobin (Bld)   
[Mass/Vol]      12.9 g/dL       Normal          11.8-17.7       [facility]  
   
                                        Comment on above:   Performed By: #### 1  
[id number], 40808956, 0767953 ####[facility] (DEFAULT)[address] [place] [address]   
   
                                Man Diff?       Auto            Invalid   
Interpretation   
Code                                                [facility]  
   
                                        Comment on above:   Performed By: #### 1  
[id number], 81004921, 8467648 ####[facility] (DEFAULT)[address] [place] [address]   
   
                                                    MCH (RBC) [Entitic   
mass]           30 pg           Normal          24-34           [facility]  
   
                                        Comment on above:   Performed By: #### 1  
[id number], 59446734, 5928236 ####[facility] (DEFAULT)[address] [place] [address] 0[address]   
   
                                                    MCHC (RBC)   
[Mass/Vol]      33 g/dL         Normal          26-37           [facility]  
   
                                        Comment on above:   Performed By: #### 1  
[id number], 29070821, 5209275 ####[facility] (DEFAULT)[address] [place] [address]   
   
                                                    MCV (RBC) [Entitic   
vol]            89 fL           Normal                    [facility]  
   
                                        Comment on above:   Performed By: #### 1  
915107875, 14579587, 5507899 ####[facility] (DEFAULT)[address] [place] [address]   
   
                      Platelet   177 x10    Normal     138-427    [facility]  
   
                                        Comment on above:   Performed By: #### 1  
[id number], 67473796, 0139159 ####[facility] (DEFAULT)[address] [place] [address]   
   
                                                    Platelet mean volume   
(Bld) [Entitic vol] 8.2 fL          Normal          6.3-10.2        [facility]  
   
                                        Comment on above:   Performed By: #### 1  
[id number], 51836897, 3835276 ####[facility] (DEFAULT)[address] [place] [address]   
   
                      RBC        4.38 x10   Normal     3.70-5.30  [facility]  
   
                                        Comment on above:   Performed By: #### 1  
[id number], 06642933, 9380910 ####[facility] (DEFAULT)[address] [place] [address]   
   
                      WBC        5.4 x10    Normal     3.5-10.5   [facility]  
   
                                        Comment on above:   Performed By: #### 1  
[id number], 09618056, 8282166 ####[facility] (DEFAULT)[address] [place] [address]   
   
                                                    .Auto Diff 1on 2024   
   
                      Auto Mono % 7 %        Normal     1-12       [facility]  
   
                                        Comment on above:   Performed By: #### 5  
[id number], 47429914, [id number],   
[id number],   
[id number], 1436695, [id number], 5951103424, 9747497, 2723821   
####[facility] (DEFAULT)[address] [place] [address]   
   
                      Baso Abs#  0.0 x10    Normal     0.0-0.2    [facility]  
   
                                        Comment on above:   Performed By: #### 5  
[id number], 08937767, [id number],   
[id number],   
[id number], 2850450, [id number], 6157972675, 2221400, 9443802   
####[facility] (DEFAULT)[address] [place] [address]   
[address]   
   
                                                    Basophils/100 WBC   
(Bld)           0.6 %           Normal          0.2-2.0         [facility]  
   
                                        Comment on above:   Performed By: #### 5  
[id number], 20337009, 4579205626,   
1930416136,   
7983018829, 6395770, 5242463061, 7750424347, 5598880, 5175382   
####[facility] (DEFAULT)[address] [place] [address]   
[address]   
   
                      Eos Abs#   0.1 x10    Normal     0.0-0.4    [facility]  
   
                                        Comment on above:   Performed By: #### 5  
758228833, 17612981, 1660321119,   
0565624545,   
8941728378, 0394029, 7609156603, 9058734128, 0549661, 8937225   
####[facility] (DEFAULT)[address] [place] [address]   
[address]   
   
                                                    Eosinophils/100 WBC   
(Bld)           2.9 %           Normal          0.9-4.0         [facility]  
   
                                        Comment on above:   Performed By: #### 5  
194521275, 95078030, 8546176627,   
9647903077,   
6015240267, 2101608, 1534443111, 0073071048, 7176446, 9173330   
####[facility] (DEFAULT)[address] [place] [address]   
[address]   
   
                      Lymph Abs# 0.8 x10    Low        1.3-2.9    [facility]  
   
                                        Comment on above:   Performed By: #### 5  
704805535, 58770416, 1512895722,   
9089071836,   
4889795941, 8970182, 6144722181, 2650451637, 4197968, 0798649   
####[facility] (DEFAULT)[address] [place] [address]   
[address]   
   
                                                    Lymphocytes/100 WBC   
(Bld)           17 %            Normal          14-48           [facility]  
   
                                        Comment on above:   Performed By: #### 5  
827151606, 25817805, 4116542857,   
8090280719,   
9012358638, 0946534, 0413936061, 8101971537, 2297881, 8920319   
####[facility] (DEFAULT)[address] [place] [address]   
   
                      Mono Abs#  0.3 x10    Normal     0.0-0.8    [facility]  
   
                                        Comment on above:   Performed By: #### 5  
600282382, 00194465, 6783874706,   
1853714626,   
4593798051, 0508832, 4355817738, 4344897277, 0140221, 6405614   
####[facility] (DEFAULT)[address] [place] [address]   
   
                      Neut Abs#  3.4 x10    Normal     1.5-9.2    [facility]  
   
                                        Comment on above:   Performed By: #### 5  
569788453, 56005191, 5749629260,   
5618093742,   
5811110485, 6542932, 5531003931, 5838731915, 8303090, 9902685   
####[facility] (DEFAULT)[address] [place] [address]   
   
                                                    Neutrophils/100 WBC   
(Bld)           73 %            Normal          44-88           [facility]  
   
                                        Comment on above:   Performed By: #### 5  
966981689, 53574554, 5459048707,   
6238487674,   
6156470612, 5304779, 1425655770, 0242568579, 7130591, 9461128   
####[facility] (DEFAULT)[address] [place] [address]   
   
                                                    Ammoniaon 2024   
   
                                                    Ammonia (P)   
[Moles/Vol]     15.0 umol/L     Normal          10.0-35.0       [facility]  
   
                                        Comment on above:   Performed By: #### 6  
06587528, 9599618, 0040363, 2084632,   
3505162   
####[facility] (DEFAULT)[address] [place] [address]   
   
                                                    BNP.on 2024   
   
                                                    Natriuretic peptide   
B (Bld) [Mass/Vol] 69.0 pg/mL      Normal          0.0-100.0       [facility]  
   
                                        Comment on above:   Result Comment: BNP   
results greater than 100 pg/mL are   
considered   
abnormal and suggestive of patients with CHF. Higher BNP   
concentrations measured in the first 72 hours after an acute   
coronary syndorme are associated with an increased risk of death,   
myocardial infarction, and CHF.   
   
                                                            Performed By: #### 5  
[id number], 84790708, 3411141969, 6786231949,   
5171045756, 4843735, 8277933311, 2090731621, 8382077, 8308939   
####[facility] (DEFAULT)[address] [place] [address]   
[address]   
   
                                                    CBC w/ Auto Diffon   
4   
   
                                                    Erythrocyte   
distribution width   
(RBC) [Ratio]   15.3 %          High            11.5-15.0       [facility]  
   
                                        Comment on above:   Performed By: #### 5  
477423409, 33269534, 2912413671,   
3774051446,   
9737030886, 0492360, 2568769989, 8983433421, 9242079, 5500761   
####[facility] (DEFAULT)[address] [place] [address]   
   
                                                    Hematocrit (Bld)   
[Volume fraction] 41.7 %          Normal          34.8-51.9       [facility]  
   
                                        Comment on above:   Performed By: #### 5  
456044277, 55166143, 4980584944,   
8253306317,   
0118595404, 6886794, 7328570394, 2589311653, 3322766, 0996507   
####[facility] (DEFAULT)[address] [place] [address]   
[address]   
   
                                                    Hemoglobin (Bld)   
[Mass/Vol]      13.8 g/dL       Normal          11.8-17.7       [facility]  
   
                                        Comment on above:   Performed By: #### 5  
930106143, 40833621, 4361270588,   
4448942996,   
7402283715, 5881853, 4931881797, 0776239601, 2986026, 3343012   
####[facility] (DEFAULT)[address] [place] [address]   
[address]   
   
                                Man Diff?       Auto            Invalid   
Interpretation   
Code                                                [facility]  
   
                                        Comment on above:   Performed By: #### 5  
976712814, 14102524, 7699472138,   
0583045325,   
8026007786, 7905063, 8884783809, 8260528060, 8083572, 8386756   
####[facility] (DEFAULT)[address] [place] [address]   
   
                                                    MCH (RBC) [Entitic   
mass]           30 pg           Normal          24-34           [facility]  
   
                                        Comment on above:   Performed By: #### 5  
051178246, 27652533, 0126812584,   
2314445587,   
3378608991, 1941370, 5233926718, 3982502233, 1334097, 0379447   
####[facility] (DEFAULT)[address] [place] [address]   
   
                                                    MCHC (RBC)   
[Mass/Vol]      33 g/dL         Normal          26-37           [facility]  
   
                                        Comment on above:   Performed By: #### 5  
677046111, 45286628, 3070833753,   
7240698426,   
7933259772, 4386076, 2161603612, 4729993360, 8206531, 9260453   
####[facility] (DEFAULT)[address] [place] [address]   
   
                                                    MCV (RBC) [Entitic   
vol]            90 fL           Normal                    [facility]  
   
                                        Comment on above:   Performed By: #### 5  
825599130, 05368683, 2099095445,   
3448474553,   
1776958546, 1572191, 0260444356, 2143248351, 0866252, 3961990   
####[facility] (DEFAULT)[address] [place] [address]   
   
                      Platelet   190 x10    Normal     138-427    [facility]  
   
                                        Comment on above:   Performed By: #### 5  
131453657, 54983400, 1644958210,   
3704413136,   
2891099459, 6187083, 0871360897, 7428757452, 5338240, 4192265   
####[facility] (DEFAULT)[address] [place] [address]   
   
                                                    Platelet mean volume   
(Bld) [Entitic vol] 8.0 fL          Normal          6.3-10.2        [facility]  
   
                                        Comment on above:   Performed By: #### 5  
475041591, 15705041, 1002554347,   
2288282957,   
2632861776, 4965358, 0781330808, 2305040855, 9268326, 2226252   
####[facility] (DEFAULT)[address] [place] [address]   
[address]   
   
                      RBC        4.63 x10   Normal     3.70-5.30  [facility]  
   
                                        Comment on above:   Performed By: #### 5  
643114561, 33476716, 5489818803,   
3629437125,   
4290720363, 7873401, 7895604833, 4677508216, 1740342, 2518008   
####[facility] (DEFAULT)[address] [place] [address]   
[address]   
   
                      WBC        4.7 x10    Normal     3.5-10.5   [facility]  
   
                                        Comment on above:   Performed By: #### 5  
810550636, 96732765, 2729502464,   
8505632254,   
5170707310, 2623557, 5281683498, 3650553693, 8747559, 1105698   
####[facility] (DEFAULT)[address] [place] [address]   
[address]   
   
                                                    [facility] Standardon 2024   
   
                                eGFR Non AA     59 mL/min/1.73m2 Invalid   
Interpretation   
Code                                                [facility]  
   
                                        Comment on above:   Performed By: #### 5  
679106008, 05505338, 1668902501,   
3636700760,   
1221675025, 4017459, 6793248213, 9355497546, 2124830, 7882655   
####[facility] (DEFAULT)[address] [place] [address]   
[address]   
   
                                eGFR AA         >60             Invalid   
Interpretation   
Code                                                [facility]  
   
                                        Comment on above:   Performed By: #### 5  
967165324, 00858796, 0100093769,   
9757400835,   
1109981521, 6691617, 1844764894, 1253715551, 3327410, 5394341   
####[facility] (DEFAULT)[address] [place] [address]   
[address]   
   
                      Albumin [Mass/Vol] 3.8 g/dL   Normal     3.5-5.0    [facility]  
   
                                        Comment on above:   Performed By: #### 5  
607160705, 76993232, 6574199351,   
3728594367,   
2257647436, 9185069, 9070741953, 8145051860, 4592506, 2148712   
####[facility] (DEFAULT)[address] [place] [address]   
[address]   
   
                                                    Albumin/Globulin   
[Mass ratio]    1.1 {ratio}     Low             1.4-2.6         [facility]  
   
                                        Comment on above:   Performed By: #### 5  
738805274, 03327295, 1570355861,   
0387449470,   
0720593686, 9279953, 6443187778, 8617601312, 7207085, 2092526   
####[facility] (DEFAULT)[address] [place] [address]   
   
                      Alk Phos   67 IU/L    Normal     32-91      [facility]  
   
                                        Comment on above:   Performed By: #### 5  
144878591, 56880554, 1109701291,   
6697812314,   
5159847447, 6205251, 8539113253, 8261885779, 8530729, 3713462   
####[facility] (DEFAULT)[address] [place] [address]   
[address]   
   
                                                    ALT [Catalytic   
activity/Vol]   15.0 U/L        Low             17.0-63.0       [facility]  
   
                                        Comment on above:   Performed By: #### 5  
519960361, 25956039, 7657257609,   
7109553757,   
0602291461, 2475460, 8126020416, 1253022635, 1964742, 2042642   
####[facility] (DEFAULT)[address] [place] [address]   
[address]   
   
                                                    Anion gap   
[Moles/Vol]     9.0 mmol/L      Normal          5.0-19.0        [facility]  
   
                                        Comment on above:   Performed By: #### 5  
230036698, 45495223, 9381619165,   
9655229703,   
2584550907, 3264301, 5084304061, 7827791733, 8150758, 0365532   
####[facility] (DEFAULT)[address] [place] [address]   
[address]   
   
                                                    AST [Catalytic   
activity/Vol]   16 U/L          Normal          15-41           [facility]  
   
                                        Comment on above:   Performed By: #### 5  
128283685, 59269101, 7189514576,   
0333833263,   
0918682957, 0067580, 7550068745, 6773153280, 8061676, 2861724   
####[facility] (DEFAULT)[address] [place] [address]   
[address]   
   
                      Bili Total 0.7 mg/dL  Normal     0.3-1.2    [facility]  
   
                                        Comment on above:   Performed By: #### 5  
311013565, 91951457, 3952673936,   
5071995143,   
4900375828, 9730895, 9349752870, 7398840929, 2980377, 5310487   
####[facility] (DEFAULT)[address] [place] [address]   
[address]   
   
                      Calcium [Mass/Vol] 8.9 mg/dL  Normal     8.9-10.3   [facility]  
   
                                        Comment on above:   Performed By: #### 5  
644809639, 47214976, 7489293747,   
2340762231,   
0870994796, 2992273, 6089029755, 5943042731, 1415696, 3509885   
####[facility] (DEFAULT)[address] [place] [address]   
[address]   
   
                      Chloride [Moles/Vol] 112 mmol/L High       101-111    [facility]  
   
                                        Comment on above:   Performed By: #### 5  
097610401, 52221039, 5229582105,   
0268486928,   
6560366794, , 4023152283, 5059968715, 4805931, 6943107   
####[facility] (DEFAULT)[address] [place] [address]   
[address]   
   
                      CO2 [Moles/Vol] 24 mmol/L  Normal     21-32      [facility]  
   
                                        Comment on above:   Performed By: #### 5  
504706597, 69592608, 3099807888,   
1603892221,   
5886531866, 4096371, 4084653495, 9936720506, 4666793, 5503701   
####[facility] (DEFAULT)[address] [place] [address]   
[address]   
   
                                                    Creatinine   
[Mass/Vol]      1.16 mg/dL      Normal          0.90-1.30       [facility]  
   
                                        Comment on above:   Performed By: #### 5  
558299210, 55766887, 8959133789,   
1315515470,   
7878780226, 5465382, 7296466837, 1090149241, 8057146, 9155725   
####[facility] (DEFAULT)[address] [place]   
[address]   
   
                                                    Globulin (S)   
[Mass/Vol]      3.3 g/dL        Normal          1.5-4.3         [facility]  
   
                                        Comment on above:   Performed By: #### 5  
527538621, 82824861, 7721834164,   
9246241858,   
9389270148, 2627975, 8814792314, 2685230589, 4212168, 2754053   
####[facility] (DEFAULT)[address] [place] [address]   
[address]   
   
                      Glucose [Mass/Vol] 137.0 mg/dL High       74.0-118.0 [facility]  
   
                                        Comment on above:   Performed By: #### 5  
096660787, 89234889, 4188237909,   
7792614006,   
1425868422, 3137406, 6690946589, 0493074370, 2193649, 0501055   
####[facility] (DEFAULT)[address] [place] [address]   
[address]   
   
                                Osmolality      289 mOsm/L      Invalid   
Interpretation   
Code                                                [facility]  
   
                                        Comment on above:   Performed By: #### 5  
988544485, 04673423, 2644827590,   
7327240998,   
3546489934, 0390932, 4885364702, 0886483435, 7185217, 9428156   
####[facility] (DEFAULT)[address] [place] [address]   
[address]   
   
                                                    Potassium   
[Moles/Vol]     4.0 mmol/L      Normal          3.6-5.1         [facility]  
   
                                        Comment on above:   Performed By: #### 5  
343618433, 97355819, 0097162775,   
5650501626,   
1213141719, 7687574, 6564623915, 5027340670, 7609988, 6709394   
####[facility] (DEFAULT)[address] [place] [address]   
[address]   
   
                      Protein [Mass/Vol] 7.1 g/dL   Normal     6.5-8.1    [facility]  
   
                                        Comment on above:   Performed By: #### 5  
947931189, 05449646, 7761817707,   
9998473386,   
7013151500, 4367814, 2609550691, 6965463890, 6853010, 2238280   
####[facility] (DEFAULT)[address] [place] [address]   
[address]   
   
                      Sodium [Moles/Vol] 141.0 mmol/L Normal     136.0-144.0 [facility]  
   
                                        Comment on above:   Performed By: #### 5  
868210642, 50634723, 0697855844,   
1016469998,   
7527225416, 5700281, 1691851511, 4648849033, 2553209, 6108182   
####[facility] (DEFAULT)[address] [place] [address]   
   
                                                    Urea nitrogen   
[Mass/Vol]      28 mg/dL        High            8-26            [facility]  
   
                                        Comment on above:   Performed By: #### 5  
592215935, 32684705, 3210445921,   
0747412640,   
4004717311, 3536213, 0789795916, 3002494979, 0582022, 2662768   
####[facility] (DEFAULT)[address] [place] [address]   
[address]   
   
                                                    Urea   
nitrogen/Creatinine   
[Mass ratio]    24.1 mg/mg      High            4.6-16.2        [facility]  
   
                                        Comment on above:   Performed By: #### 5  
494010063, 28980143, 6755663016,   
6368610091,   
7971638366, 3760115, 1328382192, 1897883938, 3058164, 5024632   
####[facility] (DEFAULT)[address] [place] [address]   
[address]   
   
                      Breakpoint Chem *******    Normal                [facility]  
   
                                        Comment on above:   Performed By: #### 5  
821988502, 95298916, 9953658501,   
8790902883,   
3631749063, 5778987, 3764385618, 6429710882, 8182048, 4033899   
####[facility] (DEFAULT)[address] [place] [address]   
[address]   
   
                                                    CT Head or Brain w/o Contras  
ton 2024   
   
                                                    CT Head or Brain w/o   
Contrast                        [facility]  
   
                                                    ED Clinical Summaryon 2024   
   
                      ED Clinical Summary            [facility]  
   
                                                    ED Note-Nursingon 2024  
   
   
                                        ED Note-Nursing     Patient is currently  
   
admitted to [facility] and   
pending labs will be   
reviewed by admitting   
provider.  
Electronically Signed   
by: Delaney Frazier on   
2024 12:18 EDT [facility]  
   
                                                    ED Patient Education Noteon   
2024   
   
                                                    ED Patient Education   
Note            Education Materials [facility]  
   
                                                    ED Patient Summaryon   
024   
   
                      ED Patient Summary            [facility]  
   
                                                    Extra Redon 2024   
   
                                Tube Collected  Yes             Invalid   
Interpretation   
Code                                                [facility]  
   
                                        Comment on above:   Performed By: #### 5  
[id number], 87763133, 9854753857,   
7212496533,   
2349486133, 4381537, 7065194148, 0148755267, 2974763, 2239337   
####[facility] (DEFAULT)[address] [place] [address]   
[address]   
   
                                                    Folateon 2024   
   
                      Folic Acid Level 8.87 ng/mL Normal     5.90-24.80 [facility]  
   
                                        Comment on above:   Performed By: #### 6  
76858699, 9466211, 3895792, 9099643,   
3083947   
####[facility] (DEFAULT)[address] [place] [address]   
[address]   
   
                                                    HgbA1c Standardon 2024  
   
   
                                .Hb             14.7            Invalid   
Interpretation   
Code                                                [facility]  
   
                                        Comment on above:   Performed By: #### 1  
[id number] ####[facility]   
(DEFAULT)[address] [place] [address]   
   
                                .Hgb A1c        0.59 g/dL       Invalid   
Interpretation   
Code                                                [facility]  
   
                                        Comment on above:   Performed By: #### 1  
[id number] ####[facility]   
(DEFAULT)[address] [place] [address]   
   
                                Glucose [Mass/Vol] 120 mg/dL       Invalid   
Interpretation   
Code                                                [facility]  
   
                                        Comment on above:   Performed By: #### 1  
[id number] ####[facility]   
(DEFAULT)[address] [place] [address]   
   
                                                    HbA1c (Bld) [Mass   
fraction]       5.8 %           Normal          4.6-6.2         [facility]  
   
                                        Comment on above:   Performed By: #### 1  
[id number] ####[facility]   
(DEFAULT)[address] [place] [address]   
   
                                                    CARLISLE Formon 2024   
   
                                        CARLISLE Form           [id number]  
42  
[id number]#1.0  
[id number]            Normal                                  [facility]  
   
                                                    Magnesiumon 2024   
   
                      Magnesium [Mass/Vol] 2.08 mg/dL Normal     1.80-2.50  [facility]  
   
                                        Comment on above:   Performed By: #### 5  
[id number], 46984819, 6315593176,   
5638504022,   
7056504838, 1261279, 7459850078, 0384535165, 0654754, 2987498   
####[facility] (DEFAULT)[address] [place] [address]   
[address]   
   
                                                    Nutrition Noteon 2024   
   
                      Nutrition Note            Normal                [facility]  
   
                                                    PTon 2024   
   
                                                    INR Coag (PPP)   
[Relative time] 1.04 {INR}      Normal          0.91-1.11       [facility] [facility]  
   
                                        Comment on above:   Performed By: #### 5  
[id number], [id number], [id number],   
[id number],   
5276287211, 5560955, 4306957014, 1291949466, 2671491, 5118483   
####[facility] (DEFAULT)[address] [place] [address]   
[address]   
   
                      PT         10.8 second(s) Normal     9.7-11.8   [facility]  
   
                                        Comment on above:   Performed By: #### 5  
[id number], 60183025, [id number],   
[id number],   
3523108668, 0666659, 9410889374, 4122018683, 0759301, 0136028   
####[facility] (DEFAULT)[address] [place] [address]   
[address]   
   
                                                    Pharmacy Noteon 2024   
   
                      Pharmacy Note            Normal                [facility]  
   
                                                    Progress Note - Nurseon    
   
                                                    Progress Note -   
Nurse                           Normal                          [facility]  
   
                                                    TSH w/ Reflex to FT4on    
   
                      TSH Qn     2.00 m[IU]/L Normal     0.45-5.33  [facility]  
   
                                        Comment on above:   Performed By: #### 6  
[id number], 0461315, 7341519, 5286434,   
[id number]   
####[facility] (DEFAULT)[address] [place] [address]   
[address]   
   
                                                    TnI HSon 2024   
   
                                                    Troponin I High   
Sensitivity     15.6 pg/mL      Normal          <=20.0          [facility]  
   
                                        Comment on above:   Performed By: #### 5  
[id number], 78599036, [id number],   
6884948412,   
3817890460, 9956469, 5209658019, 2752329258, 3634565, 0484505   
####[facility] (DEFAULT)[address] [place] [address]   
   
                                                    UA w Culture if Ind Standard  
on 2024   
   
                      Breakpoint UA ********   [facility]  
   
                                        Comment on above:   Performed By: #### 1  
[id number] ####[facility]   
(DEFAULT)[address] [place] [address]   
   
                      Color (U)  Yellow     Normal                [facility]  
   
                                        Comment on above:   Performed By: #### 1  
[id number] ####[facility]   
(DEFAULT)[address] [place] [address]   
   
                                Culture?        Not Indicated   Invalid   
Interpretation   
Code                                                [facility]  
   
                                        Comment on above:   Result Comment: Resu  
lt created by rule GL_MAGR_ADD_UA_CULT1   
   
                                                            Performed By: #### 1  
[id number] ####[facility] (DEFAULT)[address] [place] [address]   
   
                                                    Glucose (U)   
[Mass/Vol]      Negative        [facility]  
   
                                        Comment on above:   Performed By: #### 1  
[id number] ####[facility]   
(DEFAULT)[address] [place] [address]   
   
                      Ketones Ql (U) Negative   Normal                [facility]  
   
                                        Comment on above:   Performed By: #### 1  
[id number] ####[facility]   
(DEFAULT)[address] [place] [address]   
   
                                Micro?          Not Indicated   Invalid   
Interpretation   
Code                                                [facility]  
   
                                        Comment on above:   Result Comment: Resu  
lt created by rule GL_MAGR_ADD_UA_MICRO   
   
                                                            Performed By: #### 1  
[id number] ####[facility] (DEFAULT)[address] [place] [address]   
   
                      UA Bilirubin Negative   Normal                [facility]  
   
                                        Comment on above:   Performed By: #### 1  
[id number] ####[facility]   
(DEFAULT)[address] [place] [address]   
   
                      UA Blood   Negative   Normal     NEGATIVE   [facility]  
   
                                        Comment on above:   Performed By: #### 1  
[id number] ####[facility]   
(DEFAULT)[address] [place] [address]   
   
                      UA Clarity CLEAR      Normal     CLEAR      [facility]  
   
                                        Comment on above:   Performed By: #### 1  
[id number] ####[facility]   
(DEFAULT)[address] [place] [address]   
   
                      UA Leuk Est Negative   Normal     NEGATIVE   [facility]  
   
                                        Comment on above:   Performed By: #### 1  
[id number] ####[facility]   
(DEFAULT)[address] [place] [address]   
   
                      UA Nitrite Negative   Normal     NEGATIVE   [facility]  
   
                                        Comment on above:   Performed By: #### 1  
[id number] ####[facility]   
(DEFAULT)[address] [place] [address]   
   
                      UA pH      6.0        Normal     5-8        [facility]  
   
                                        Comment on above:   Performed By: #### 1  
[id number] ####[facility]   
(DEFAULT)[address] [place] [address]   
   
                      UA Protein Negative   Normal     NEGATIVE   [facility]  
   
                                        Comment on above:   Performed By: #### 1  
[id number] ####[facility]   
(DEFAULT)[address] [place] [address]   
   
                      UA Spec Grav >=1.030    Normal     1.001-1.035 [facility]  
   
                                        Comment on above:   Performed By: #### 1  
[id number] ####[facility]   
(DEFAULT)[address] [place] [address]   
   
                      UA Urobilinogen 0.2 mg/dL  Normal     0.2-1.0    [facility]  
   
                                        Comment on above:   Performed By: #### 1  
[id number] ####[facility]   
(DEFAULT)[address] [place] [address]   
   
                      Urine Source Clean Catch Normal                [facility]  
   
                                        Comment on above:   Performed By: #### 1  
[id number] ####[facility]   
(DEFAULT)[address] [place] [address]   
   
                                                    Vit B12 Lvlon 2024   
   
                      Vit B12    275        Normal     180-914    [facility]  
   
                                        Comment on above:   Performed By: #### 6  
[id number], 0518080, 1519340, 2252483,   
[id number]   
####[facility] (DEFAULT)[address] [place] [address]   
[address]   
   
                                                    Vit D25 OHon 2024   
   
                      Vitamin D 25 OH 16 ng/mL   Low             [facility]  
   
                                        Comment on above:   Performed By: #### 6  
[id number], 1741762, 0072683, 9313158,   
0796905   
####[facility] (DEFAULT)[address] [place] [address]   
[address]   
   
                                                    XR Chest 1 View Frontalon    
   
                                                    XR Chest 1 View   
Frontal                         Normal                          [facility]  
   
                                                    Coding Summaryon 2024   
   
                      Coding Summary            Normal                [facility]  
   
                                                    C Bloodon 2024   
   
                                        C Blood             Right AC @ 10:30 am   
MW  
No growth at 5 Days Normal                                  [facility]  
   
                                        Comment on above:   Performed By: #### 6  
[id number] ####[facility] (DEFAULT)[address] [place] [address]   
   
                                                    ED Note - Physicianon 07-10-  
2024   
   
                      ED Note - Physician            [facility]  
   
                                                    C Urineon 2024   
   
                      C Urine               Normal                [facility]  
   
                                        Comment on above:   Performed By: #### 1  
[id number], 09469837, 3394347 ####[facility] (DEFAULT)[address] [place] [address]   
   
                                                    Outside Recordson 2024  
   
   
                                        Outside Records     [id number]  
01  
[id number]#1.0  
[id number]            Normal                                  [facility]  
   
                                                    .Auto Diff 12024   
   
                      Auto Mono % 8 %        Normal     -12       [facility]  
   
                                        Comment on above:   Performed By: #### 1  
[id number], 8905689823, 44273117, 0781560,   
5993697215, 4637234755 ####[facility] (DEFAULT)[address] [place] [address]   
   
                      Baso Abs#  0.0 x10    Normal     0.0-0.2    [facility]  
   
                                        Comment on above:   Performed By: #### 1  
[id number], 3879593253, 54550725, 2044774,   
3662671642, 5068518977 ####[facility] (DEFAULT)[address] [place] [address]   
   
                                                    Basophils/100 WBC   
(Bld)           0.4 %           Normal          0.2-2.0         [facility]  
   
                                        Comment on above:   Performed By: #### 1  
[id number], 3113791663, 93093605, 4050559,   
7460425009, 6967424211 ####[facility] (DEFAULT)[address] [place] [address]   
   
                      Eos Abs#   0.1 x10    Normal     0.0-0.4    [facility]  
   
                                        Comment on above:   Performed By: #### 1  
[id number], 5400546804, 71685628, 0738788,   
2698393893, 9892882060 ####[facility] (DEFAULT)[address] [place] [address]   
   
                                                    Eosinophils/100 WBC   
(Bld)           1.4 %           Normal          0.9-4.0         [facility]  
   
                                        Comment on above:   Performed By: #### 1  
[id number], 0950853262, 43755625, 4604739,   
1429300770, 8308305022 ####[facility] (DEFAULT)[address] [place] [address]   
   
                      Lymph Abs# 1.0 x10    Low        1.3-2.9    [facility]  
   
                                        Comment on above:   Performed By: #### 1  
432128143, 0303554866, 11894038, 6683996,   
8822498757, 2556697872 ####[facility] (DEFAULT)[address] [place] [address]   
   
                                                    Lymphocytes/100 WBC   
(Bld)           16 %            Normal          14-48           [facility]  
   
                                        Comment on above:   Performed By: #### 1  
[id number], 1251739092, 72800928, 2413505,   
7576655950, 3018046169 ####[facility] (DEFAULT)[address] [place] [address]   
   
                      Mono Abs#  0.5 x10    Normal     0.0-0.8    [facility]  
   
                                        Comment on above:   Performed By: #### 1  
[id number], 2987529749, 64365245, 1781043,   
2284879285, 2924498015 ####[facility] (DEFAULT)[address] [place] [address]   
   
                      Neut Abs#  4.9 x10    Normal     1.5-9.2    [facility]  
   
                                        Comment on above:   Performed By: #### 1  
[id number], 3973855904, 23341270, 8912644,   
8729802792, 4403968733 ####[facility] (DEFAULT)[address] [place] [address]   
   
                                                    Neutrophils/100 WBC   
(Bld)           74 %            Normal          44-88           [facility]  
   
                                        Comment on above:   Performed By: #### 1  
[id number], 7171808589, 11154405, 3212566,   
3883967088, 8740339893 ####[facility] (DEFAULT)[address] [place] [address]   
   
                                                    CBC w/ Auto Diffon   
4   
   
                                                    Erythrocyte   
distribution width   
(RBC) [Ratio]   14.8 %          Normal          11.5-15.0       [facility]  
   
                                        Comment on above:   Performed By: #### 1  
[id number], 6868556296, 13911407, 0802577,   
2292456026, 6441651811 ####[facility] (DEFAULT)[address] [place] [address]   
   
                                                    Hematocrit (Bld)   
[Volume fraction] 42.1 %          Normal          34.8-51.9       [facility]  
   
                                        Comment on above:   Performed By: #### 1  
[id number], 0097270591, 61585644, 6080658,   
2302599351, 1572296060 ####[facility] (DEFAULT)[address] [place] [address]   
   
                                                    Hemoglobin (Bld)   
[Mass/Vol]      13.8 g/dL       Normal          11.8-17.7       [facility]  
   
                                        Comment on above:   Performed By: #### 1  
[id number], 4009852526, 57767495, 2232556,   
3999832874, 3850038880 ####[facility] (DEFAULT)[address] [place] [address]   
   
                                Man Diff?       Auto            Invalid   
Interpretation   
Code                                                [facility]  
   
                                        Comment on above:   Performed By: #### 1  
[id number], 8769042389, 87402804, 4816487,   
9708039426, 2875974269 ####[facility] (DEFAULT)[address] [place] [address]   
   
                                                    MCH (RBC) [Entitic   
mass]           29 pg           Normal          24-34           [facility]  
   
                                        Comment on above:   Performed By: #### 1  
[id number], 1377925011, 08733814, 8379608,   
8514461777, 3347062170 ####[facility] (DEFAULT)[address] [place] [address]   
   
                                                    MCHC (RBC)   
[Mass/Vol]      33 g/dL         Normal          26-37           [facility]  
   
                                        Comment on above:   Performed By: #### 1  
[id number], 2466638246, 47345972, 9264613,   
6573462441, 2830932550 ####[facility] (DEFAULT)[address] [place] [address]   
   
                                                    MCV (RBC) [Entitic   
vol]            90 fL           Normal                    [facility]  
   
                                        Comment on above:   Performed By: #### 1  
[id number], 0431131396, 55263111, 2342576,   
5012170247, 7962055455 ####[facility] (DEFAULT)[address] [place] [address]   
   
                      Platelet   191 x10    Normal     138-427    [facility]  
   
                                        Comment on above:   Performed By: #### 1  
[id number], 1280683638, 68681109, 1222056,   
0209397960, 4611673173 ####[facility] (DEFAULT)[address] [place] [address]   
   
                                                    Platelet mean volume   
(Bld) [Entitic vol] 8.1 fL          Normal          6.3-10.2        [facility]  
   
                                        Comment on above:   Performed By: #### 1  
[id number], 3202800877, 10123752, 1380062,   
6499684392, 1565459733 ####[facility] (DEFAULT)[address] [place] [address]   
   
                      RBC        4.68 x10   Normal     3.70-5.30  [facility]  
   
                                        Comment on above:   Performed By: #### 1  
966074161, 6991845605, 46747781, 4039815,   
3512622576, 4656670544 ####[facility] (DEFAULT)[address] [place] [address]   
   
                      WBC        6.6 x10    Normal     3.5-10.5   [facility]  
   
                                        Comment on above:   Performed By: #### 1  
630761440, 0141052229, 09533633, 1973759,   
9073016163, 3973077123 ####[facility] (DEFAULT)[address] [place] [address] Standardon 2024   
   
                                eGFR Non AA     57 mL/min/1.73m2 Invalid   
Interpretation   
Code                                                [facility]  
   
                                        Comment on above:   Performed By: #### 1  
[id number], 4953735820, 61243001, 9695757,   
9230502474, 4740676475 ####[facility] (DEFAULT)[address] [place] [address]   
   
                                eGFR AA         >60             Invalid   
Interpretation   
Code                                                [facility]  
   
                                        Comment on above:   Performed By: #### 1  
[id number], 6726123243, 80993002, 1734346,   
9798719882, 4599176887 ####[facility] (DEFAULT)[address] [place] [address]   
   
                      Albumin [Mass/Vol] 3.9 g/dL   Normal     3.5-5.0    [facility]  
   
                                        Comment on above:   Performed By: #### 1  
[id number], 8651592803, 44991140, 1301491,   
6274210600, 5543941744 ####[facility] (DEFAULT)[address] [place] [address]   
   
                                                    Albumin/Globulin   
[Mass ratio]    1.1 {ratio}     Low             1.4-2.6         [facility]  
   
                                        Comment on above:   Performed By: #### 1  
[id number], 0359275868, 72466047, 6375165,   
3377265434, 2488166573 ####[facility] (DEFAULT)[address] [place] [address]   
   
                      Alk Phos   75 IU/L    Normal     32-91      [facility]  
   
                                        Comment on above:   Performed By: #### 1  
773581198, 1938839694, 37873915, 8086191,   
9250945165, 1923340712 ####[facility] (DEFAULT)[address] [place] [address]   
   
                                                    ALT [Catalytic   
activity/Vol]   18.0 U/L        Normal          17.0-63.0       [facility]  
   
                                        Comment on above:   Performed By: #### 1  
[id number], 3506891199, 21569970, 6647676,   
8292289974, 9792082473 ####[facility] (DEFAULT)[address] [place] [address]   
   
                                                    Anion gap   
[Moles/Vol]     9.4 mmol/L      Normal          5.0-19.0        [facility]  
   
                                        Comment on above:   Performed By: #### 1  
[id number], 1930285152, 03087044, 1040249,   
0467025297, 9870931182 ####[facility] (DEFAULT)[address] [place] [address]   
   
                                                    AST [Catalytic   
activity/Vol]   24 U/L          Normal          15-41           [facility]  
   
                                        Comment on above:   Performed By: #### 1  
[id number], 1548343305, 21275568, 7845923,   
6809215622, 4113733369 ####[facility] (DEFAULT)[address] [place] [address]   
   
                      Bili Total 0.7 mg/dL  Normal     0.3-1.2    [facility]  
   
                                        Comment on above:   Performed By: #### 1  
[id number], 2985615467, 04068805, 2008929,   
7126327329, 2984507295 ####[facility] (DEFAULT)[address] [place] [address]   
   
                      Calcium [Mass/Vol] 8.8 mg/dL  Low        8.9-10.3   [facility]  
   
                                        Comment on above:   Performed By: #### 1  
420703673, 2733032211, 43883349, 1473762,   
4400223331, 1880477988 ####[facility] (DEFAULT)[address] [place] [address]   
   
                      Chloride [Moles/Vol] 110 mmol/L Normal     101-111    [facility]  
   
                                        Comment on above:   Performed By: #### 1  
[id number], 7218456935, 63751277, 5737803,   
0852853531, 3690556623 ####[facility] (DEFAULT)[address] [place] [address]   
   
                      CO2 [Moles/Vol] 23 mmol/L  Normal     21-32      [facility]  
   
                                        Comment on above:   Performed By: #### 1  
[id number], 5658878983, 21396261, 7357471,   
4853962694, 3478973934 ####[facility] (DEFAULT)[address] [place] [address]   
   
                                                    Creatinine   
[Mass/Vol]      1.20 mg/dL      Normal          0.90-1.30       [facility]  
   
                                        Comment on above:   Performed By: #### 1  
[id number], 0305747979, 68295347, 8865360,   
0061766064, 1447067457 ####[facility] (DEFAULT)[address] [place] [address]   
   
                                                    Globulin (S)   
[Mass/Vol]      3.3 g/dL        Normal          1.5-4.3         [facility]  
   
                                        Comment on above:   Performed By: #### 1  
[id number], 3267978062, 78022156, 9081262,   
8420343235, 0971942771 ####[facility] (DEFAULT)[address] [place] [address]   
   
                      Glucose [Mass/Vol] 114.0 mg/dL Normal     74.0-118.0 [facility]  
   
                                        Comment on above:   Performed By: #### 1  
[id number], 6367156822, 87696299, 7382810,   
8849135699, [id number] ####[facility] (DEFAULT)[address] [place] [address]   
   
                                Osmolality      282 mOsm/L      Invalid   
Interpretation   
Code                                                [facility]  
   
                                        Comment on above:   Performed By: #### 1  
[id number], 6885802549, 34474339, 0461420,   
5352378457, 5527961887 ####[facility] (DEFAULT)[address] [place] [address]   
   
                                                    Potassium   
[Moles/Vol]     4.4 mmol/L      Normal          3.6-5.1         [facility]  
   
                                        Comment on above:   Performed By: #### 1  
[id number], 1504292304, 67598810, 7585937,   
0209429236, [id number] ####[facility] (DEFAULT)[address] [place] [address]   
   
                      Protein [Mass/Vol] 7.2 g/dL   Normal     6.5-8.1    [facility]  
   
                                        Comment on above:   Performed By: #### 1  
[id number], [id number], 71450868, 7349441,   
[id number], [id number] ####[facility] (DEFAULT)[address] [place] [address]   
   
                      Sodium [Moles/Vol] 138.0 mmol/L Normal     136.0-144.0 [facility]  
   
                                        Comment on above:   Performed By: #### 1  
[id number], [id number], 40952645, 0617652,   
[id number], [id number] ####[facility] (DEFAULT)[address] [place] [address]   
   
                                                    Urea nitrogen   
[Mass/Vol]      29 mg/dL        High            8-26            [facility]  
   
                                        Comment on above:   Performed By: #### 1  
[id number], [id number], 79309364, 1444813,   
[id number], [id number] ####[facility] (DEFAULT)[address] [place] [address]   
   
                                                    Urea   
nitrogen/Creatinine   
[Mass ratio]    24.1 mg/mg      High            4.6-16.2        [facility]  
   
                                        Comment on above:   Performed By: #### 1  
[id number], [id number], 35825466, 9773256,   
[id number], [id number] ####[facility] (DEFAULT)[address] [place] [address]   
   
                                                    ED Clinical Summaryon 2024   
   
                      ED Clinical Summary            Normal                [facility]  
   
                                                    ED Note - Physicianon 2024   
   
                      ED Note - Physician            [facility]  
   
                                                    ED Patient Education Noteon   
2024   
   
                                                    ED Patient Education   
Note                            Normal                          [facility]  
   
                                                    ED Patient Summaryon   
024   
   
                      ED Patient Summary            Normal                [facility]  
   
                                                    Extra Blueon 2024   
   
                                Tube Collected  Yes             Invalid   
Interpretation   
Code                                                [facility]  
   
                                        Comment on above:   Performed By: #### 1  
[id number], [id number], 40296613, 6647093,   
[id number], [id number] ####[facility] (DEFAULT)[address] [place] [address]   
   
                                                    Lactic Acidon 2024   
   
                      Lactic Acid 14.1 mg/dL Normal     4.5-19.8   [facility]  
   
                                        Comment on above:   Performed By: #### 2  
[id number] ####[facility] (DEFAULT)[address] [place] [address]   
   
                                                    Progress Note - Nurseon    
   
                                                    Progress Note -   
Nurse                           Normal                          [facility]  
   
                                                    SARS-CoV-2 (COVID-19) PCRon   
2024   
   
                                                    Employed in   
healthcare?               Unknown                   Invalid   
Interpretation   
Code                                                [facility]  
   
                                        Comment on above:   Performed By: #### 6  
[id number] ####[facility]   
(DEFAULT)[address] [place] [address]   
   
                                Group care resident? Unknown         Invalid   
Interpretation   
Code                                                [facility]  
   
                                        Comment on above:   Performed By: #### 6  
[id number] ####[facility]   
(DEFAULT)[address] [place] [address]   
   
                                In ICU?         Unknown         Invalid   
Interpretation   
Code                                                [facility]  
   
                                        Comment on above:   Performed By: #### 6  
[id number] ####[facility]   
(DEFAULT)[address] [place] [address]   
   
                      Internal Control Pass       Normal                [facility]  
   
                                        Comment on above:   Performed By: #### 6  
[id number] ####[facility]   
(DEFAULT)[address] [place] [address]   
   
                                Pregnancy status? Unknown         Invalid   
Interpretation   
Code                                                [facility]  
   
                                        Comment on above:   Performed By: #### 6  
[id number] ####[facility]   
(DEFAULT)[address] [place] [address]   
   
                                                    SARS-CoV-2   
(COVID-19) RNA   
MIRIAM+probe Ql (Unsp   
spec)           Not detected    Normal          Not Detected    [facility]  
   
                                        Comment on above:   Result Comment: Perf  
ormed by PCR methodology.   
   
                                                            Performed By: #### 6  
[id number] ####[facility] (DEFAULT)[address] [place] [address]   
   
                                                    SARS-CoV-2   
(COVID-19) RNA   
MIRIAM+probe Ql (Unsp   
spec)                     Unknown                   Invalid   
Interpretation   
Code                                                [facility]  
   
                                        Comment on above:   Performed By: #### 6  
[id number] ####[facility]   
(DEFAULT)[address] [place] [address]   
   
                                                    Symptomatic as   
defined by CDC?           Unknown                   Invalid   
Interpretation   
Code                                                [facility]  
   
                                        Comment on above:   Performed By: #### 6  
[id number] ####[facility]   
(DEFAULT)[address] [place] [address]   
   
                                                    UA Gaynt3vc 2024   
   
                      UA Bacteria 3+         Normal                [facility]  
   
                                        Comment on above:   Order Comment: Urina  
lysis Microscopic order added on by   
[employer]   
Expert Rules system.   
   
                                                            Performed By: #### 1  
[id number], [id number], [id number] ####[facility] (DEFAULT)[address] [place] [address]   
   
                      UA RBC     15-20      Normal                [facility]  
   
                                        Comment on above:   Order Comment: Urina  
lysis Microscopic order added on by   
[employer]   
Expert Rules system.   
   
                                                            Performed By: #### 1  
[id number], 71116571, 3858293 ####[facility] (DEFAULT)[address] [place] [address]   
   
                      UA Squam Epi Rare       [facility]  
   
                                        Comment on above:   Order Comment: Urina  
lysis Microscopic order added on by   
[employer]   
Expert Rules system.   
   
                                                            Performed By: #### 1  
[id number], [id number], 6505699 ####[facility] (DEFAULT)[address] [place] [address]   
   
                      UA WBC     60-70      [facility]  
   
                                        Comment on above:   Order Comment: Urina  
lysis Microscopic order added on by   
[employer]   
[employer]pert Rules system.   
   
                                                            Performed By: #### 1  
[id number], 07007524, 6668942 ####[facility] (DEFAULT)[address] [place] [address]   
   
                                                    UA w Culture if Ind Standard  
on 2024   
   
                      Breakpoint UA ********   [facility]  
   
                                        Comment on above:   Performed By: #### 1  
[id number], 39065678, 5012326 ####[facility] (DEFAULT)[address] [place] [address]   
   
                      Color (U)  Yellow     [facility]  
   
                                        Comment on above:   Performed By: #### 1  
[id number], 97735160, 8829508 ####[facility] (DEFAULT)[address] [place] [address]   
   
                      Culture?   Yes        [facility]  
   
                                        Comment on above:   Result Comment: Resu  
lt created by rule GL_MAGR_ADD_UA_CULT   
Result   
created by rule GL_MAGR_ADD_UA_CULT1   
   
                                                            Performed By: #### 1  
[id number], 69923035, 7835179 ####[facility] (DEFAULT)[address] [place] [address]   
   
                                                    Glucose (U)   
[Mass/Vol]      Negative        [facility]  
   
                                        Comment on above:   Performed By: #### 1  
[id number], 01054648, 4712913 ####[facility] (DEFAULT)[address] [place] [address]   
   
                      Ketones Ql (U) Negative   [facility]  
   
                                        Comment on above:   Performed By: #### 1  
[id number], 36215089, 1106089 ####[facility] (DEFAULT)[address] [place] [address]   
   
                                Micro?          Indicated       Invalid   
Interpretation   
Code                                                [facility]  
   
                                        Comment on above:   Result Comment: Resu  
lt created by rule GL_MAGR_ADD_UA_MICRO   
   
                                                            Performed By: #### 1  
[id number], 06367747, 8534124 ####[facility] (DEFAULT)[address] [place] [address]   
   
                      UA Bilirubin Negative   Normal                [facility]  
   
                                        Comment on above:   Performed By: #### 1  
[id number], 61958267, 2431510 ####[facility] (DEFAULT)[address] [place] [address]   
   
                      UA Blood   MODERATE   Abnormal   NEGATIVE   [facility]  
   
                                        Comment on above:   Performed By: #### 1  
[id number], 62898287, 7458749 ####[facility] (DEFAULT)[address] [place] [address]   
   
                      UA Clarity SL CLOUDY  Abnormal   CLEAR      [facility]  
   
                                        Comment on above:   Performed By: #### 1  
[id number], 87616116, 9812964 ####[facility] (DEFAULT)[address] [place] [address]   
   
                      UA Leuk Est MODERATE   Abnormal   NEGATIVE   [facility]  
   
                                        Comment on above:   Performed By: #### 1  
[id number], 65062870, 0271028 ####[facility] (DEFAULT)[address] [place] [address]   
   
                      UA Nitrite Positive   Abnormal   NEGATIVE   [facility]  
   
                                        Comment on above:   Performed By: #### 1  
[id number], 75729673, 0563646 ####[facility] (DEFAULT)[address] [place] [address]   
   
                      UA pH      6.0        Normal     5-8        [facility]  
   
                                        Comment on above:   Performed By: #### 1  
[id number], 51815042, 3452248 ####[facility] (DEFAULT)[address] [place] [address]   
   
                      UA Protein TRACE      Abnormal   NEGATIVE   [facility]  
   
                                        Comment on above:   Performed By: #### 1  
[id number], 62400343, 2854612 ####[facility] (DEFAULT)[address] [place] [address]   
   
                      UA Spec Grav >=1.030    Normal     1.001-1.035 [facility]  
   
                                        Comment on above:   Performed By: #### 1  
[id number], 06065377, 6023097 ####[facility] (DEFAULT)[address] [place] [address]   
   
                      UA Urobilinogen 0.2 mg/dL  Normal     0.2-1.0    [facility]  
   
                                        Comment on above:   Performed By: #### 1  
[id number], 25399254, 3985854 ####[facility] (DEFAULT)[address] [place] [address]   
   
                      Urine Source Clean Catch [facility]  
   
                                        Comment on above:   Performed By: #### 1  
[id number], 50972585, 1420676 ####[facility] (DEFAULT)[address] [place] [address]   
   
                                                    XR Chest 2 Viewson    
   
                      XR Chest 2 Views            Normal                [facility]  
   
                                                    Outside Recordson 2024  
   
   
                                        Outside Records     [id number]  
[id number]#1.  
00OTGTIFF           [facility]  
   
                                        Outside Records     [id number]  
[id number]#1.  
00OTGTIFF           [facility]  
   
                                                    Outside Recordson 2024  
   
   
                                        Outside Records     [id number]  
21  
[id number]#1.0  
0OTGTIFF            [facility]  
   
                                                    Coding Summaryon 2024   
   
                      Coding Summary            Normal                [facility]  
   
                                                    Outside Recordson 2024  
   
   
                                        Outside Records     [id number]  
30  
[id number]#1.0  
0OTGTIFF            [facility]  
   
                                                    C Urineon 2024   
   
                      C Urine               Normal                [facility]  
   
                                        Comment on above:   Performed By: #### 6  
[id number] ####[facility] (DEFAULT)[address] [place], [place] [address]   
   
                                                    Provider Orderson 2024  
   
   
                                        Provider Orders     [id number]  
04  
[id number]#1.  
00OTGTIFF           [facility]  
   
                                                    Outside Recordson 2024  
   
   
                                        Outside Records     [id number]  
40  
[id number]#1.0  
0OTGTIFF            [facility]  
   
                                                    Office/Clinic Noteon   
024   
   
                      Office/Clinic Note            [facility]  
   
                                                    Outside Recordson 2024  
   
   
                                        Outside Records     [id number]  
41  
[id number]#1.00  
OTGTIFF             [facility]  
   
                                        Outside Records     [id number]  
41  
[id number]#1.00  
OTGTIFF             [facility]  
   
                                                    Outside Recordson 04-  
   
   
                                        Outside Records     [id number]  
31  
[id number]#1.0  
0OTGTIFF            [facility]  
   
                                                    Office/Clinic Noteon   
024   
   
                      Office/Clinic Note            [facility]  
   
                                                    Coding Summaryon 2024   
   
                      Coding Summary            [facility]  
   
                                                    C Bloodon 03-   
   
                                        C Blood             2nd site 15 minutes   
after first Blood   
Culture is drawn  
No growth at 5 Days [facility]  
   
                                        Comment on above:   Performed By: #### 6  
[id number] ####[facility] (DEFAULT)[address] [place] [address]   
   
                      C Blood    No growth at 5 Days [facility]  
   
                                        Comment on above:   Performed By: #### 6  
[id number] ####[facility] (DEFAULT)[address] [place] [address]   
   
                                                    Coding Summaryon 2024   
   
                      Coding Summary            [facility]  
   
                                                    C Urineon 2024   
   
                                        C Urine             Urine Culture ordere  
d   
as a result of   
parameters set on   
specific urine dip and   
urine microsopic   
results.  
No growth at 2 days. [facility]  
   
                                        Comment on above:   Performed By: #### 6  
[id number], 77449576, [id number] ####[facility] (DEFAULT)[address] [place] [address]   
   
                                                    Consent Formson 2024   
   
                                        Consent Forms       [id number]  
31  
[id number]#1.00O  
TGTIFF              [facility]  
   
                                        Consent Forms       [id number]  
03  
[id number]#1.00  
[facility]  
   
                                                    Outside Recordson 2024  
   
   
                                        Outside Records     [id number]  
03  
[id number]#1.00  
[facility]  
   
                                        Outside Records     [id number]  
03  
[id number]#1.00  
[facility]  
   
                                                    Provider Orderson 2024  
   
   
                                        Provider Orders     [id number]  
  
[id number]#1.00  
[facility]  
   
                                                    Transfer Noteon 2024   
   
                                        Transfer Note       [id number]  
31  
[id number]#1.00O  
[facility]  
   
                                                    .Auto Diff 1on 2024   
   
                      Auto Mono % 10 %       Normal            [facility]  
   
                                        Comment on above:   Performed By: #### 1  
[id number], 8700323, 1696644, 9129573,   
5151957,   
[id number] ####[facility] (DEFAULT)[address] [place] [address]   
   
                      Baso Abs#  0.0 x10    Normal     0.0-0.2    [facility]  
   
                                        Comment on above:   Performed By: #### 1  
[id number], 4685782, 8026864, 3042706,   
3848744,   
[id number] ####[facility] (DEFAULT)[address] [place] [address]   
   
                                                    Basophils/100 WBC   
(Bld)           0.2 %           Normal          0.2-2.0         [facility]  
   
                                        Comment on above:   Performed By: #### 1  
[id number], 8054471, 6568808, 6386569,   
7936102,   
[id number] ####[facility] (DEFAULT)[address] [place] [address]   
   
                      Eos Abs#   0.1 x10    Normal     0.0-0.4    [facility]  
   
                                        Comment on above:   Performed By: #### 1  
[id number], 4037401, 9736942, 1003942,   
7923370,   
[id number] ####[facility] (DEFAULT)[address] [place] [address]   
   
                                                    Eosinophils/100 WBC   
(Bld)           1.3 %           Normal          0.9-4.0         [facility]  
   
                                        Comment on above:   Performed By: #### 1  
[id number], 7235510, 6553330, 9495797,   
1890048,   
[id number] ####[facility] (DEFAULT)[address] [place] [address]   
   
                      Lymph Abs# 0.7 x10    Low        1.3-2.9    [facility]  
   
                                        Comment on above:   Performed By: #### 1  
[id number], 9988006, 6698649, 0228969,   
3556927,   
[id number] ####[facility] (DEFAULT)[address] [place] [address]   
   
                                                    Lymphocytes/100 WBC   
(Bld)           13 %            Low             14-48           [facility]  
   
                                        Comment on above:   Performed By: #### 1  
[id number], 6073701, 1432593, 9166020,   
8009276,   
[id number] ####[facility] (DEFAULT)[address] [place] [address]   
   
                      Mono Abs#  0.5 x10    Normal     0.0-0.8    [facility]  
   
                                        Comment on above:   Performed By: #### 1  
8119829, 2282315, 6743028, 4853374,   
0151346,   
[id number] ####[facility] (DEFAULT)[address] [place] [address]   
   
                      Neut Abs#  3.8 x10    Normal     1.5-9.2    [facility]  
   
                                        Comment on above:   Performed By: #### 1  
0851810, 3818373, 4510285, 1447456,   
0124183,   
[id number] ####[facility] (DEFAULT)[address] [place] [address]   
   
                                                    Neutrophils/100 WBC   
(Bld)           75 %            Normal          44-88           [facility]  
   
                                        Comment on above:   Performed By: #### 1  
5509181, 9087099, 9930159, 3920371,   
8498301,   
[id number] ####[facility] (DEFAULT)[address] [place] [address]   
   
                                                    CBC w/ Auto Diffon   
4   
   
                                                    Erythrocyte   
distribution width   
(RBC) [Ratio]   15.1 %          High            11.5-15.0       [facility]  
   
                                        Comment on above:   Performed By: #### 1  
[id number], 2070062, 1643172, 8652669,   
4606508,   
[id number] ####[facility] (DEFAULT)[address] [place] [address]   
   
                                                    Hematocrit (Bld)   
[Volume fraction] 38.1 %          Normal          34.8-51.9       [facility]  
   
                                        Comment on above:   Performed By: #### 1  
[id number], 1676076, 6401122, 4078307,   
9870852,   
[id number] ####[facility] (DEFAULT)[address] [place] [address]   
   
                                                    Hemoglobin (Bld)   
[Mass/Vol]      13.0 g/dL       Normal          11.8-17.7       [facility]  
   
                                        Comment on above:   Performed By: #### 1  
2584691, 0833347, 8597176, 2071330,   
4917887,   
[id number] ####[facility] (DEFAULT)[address] [place] [address]   
   
                                Man Diff?       Auto            Invalid   
Interpretation   
Code                                                [facility]  
   
                                        Comment on above:   Performed By: #### 1  
6909037, 3304470, 4115019, 2269870,   
2127788,   
[id number] ####[facility] (DEFAULT)[address] [place] [address]   
   
                                                    MCH (RBC) [Entitic   
mass]           30 pg           Normal          24-34           [facility]  
   
                                        Comment on above:   Performed By: #### 1  
4955567, 9346936, 3484356, 7300433,   
4372290,   
[id number] ####[facility] (DEFAULT)[address] [place] [address]   
   
                                                    MCHC (RBC)   
[Mass/Vol]      34 g/dL         Normal          26-37           [facility]  
   
                                        Comment on above:   Performed By: #### 1  
2267441, 8843520, 2654781, 3658634,   
7252822,   
[id number] ####[facility] (DEFAULT)[address] [place] [address]   
   
                                                    MCV (RBC) [Entitic   
vol]            88 fL           Normal                    [facility]  
   
                                        Comment on above:   Performed By: #### 1  
8984566, 1926478, 5584968, 9789495,   
5037965,   
[id number] ####[facility] (DEFAULT)[address] [place] [address]   
   
                      Platelet   164 x10    Normal     138-427    [facility]  
   
                                        Comment on above:   Performed By: #### 1  
2545019, 2017508, 9041774, 7298316,   
6160340,   
[id number] ####[facility] (DEFAULT)[address] [place] [address]   
   
                                                    Platelet mean volume   
(Bld) [Entitic vol] 8.4 fL          Normal          6.3-10.2        [facility]  
   
                                        Comment on above:   Performed By: #### 1  
1435947, 7294023, 1162553, 8745078,   
2600273,   
[id number] ####[facility] (DEFAULT)[address] [place] [address]   
   
                      RBC        4.34 x10   Normal     3.70-5.30  [facility]  
   
                                        Comment on above:   Performed By: #### 1  
9982517, 7927401, 4828953, 8645936,   
0982360,   
[id number] ####[facility] (DEFAULT)[address] [place] [address]   
   
                      WBC        5.1 x10    Normal     3.5-10.5   [facility]  
   
                                        Comment on above:   Performed By: #### 1  
0315326, 8515280, 0441249, 2629354,   
7577293,   
[id number] ####[facility] (DEFAULT)[address] [place] [address]   
   
                                                    CKon 2024   
   
                                                    CK [Catalytic   
activity/Vol]   364 U/L         Normal                    [facility]  
   
                                        Comment on above:   Performed By: #### 1  
4552635, 7361791, 9294256, 0226637,   
3863521,   
[id number] ####[facility] (DEFAULT)[address] [place] [address]   
   
                                                    CMP Standardon 2024   
   
                                eGFR Non AA     59 mL/min/1.73m2 Invalid   
Interpretation   
Code                                                [facility]  
   
                                        Comment on above:   Performed By: #### 1  
5048034, 7077289, 2246563, 7775760,   
9191358,   
[id number] ####[facility] (DEFAULT)[address] [place] [address]   
   
                                eGFR AA         >60             Invalid   
Interpretation   
Code                                                [facility]  
   
                                        Comment on above:   Performed By: #### 1  
0014623, 1211555, 8804932, 4725986,   
2616095,   
[id number] ####[facility] (DEFAULT)[address] [place] [address]   
   
                      Albumin [Mass/Vol] 3.1 g/dL   Low        3.5-5.0    [facility]  
   
                                        Comment on above:   Performed By: #### 1  
6417074, 9384212, 4584787, 5919678,   
1615804,   
[id number] ####[facility] (DEFAULT)[address] [place] [address]   
   
                      Alk Phos   54 IU/L    Normal     32-91      [facility]  
   
                                        Comment on above:   Performed By: #### 1  
6202812, 2119488, 4488784, 1698202,   
4434546,   
[id number] ####[facility] (DEFAULT)[address] [place] [address]   
   
                                                    ALT [Catalytic   
activity/Vol]   9.0 U/L         Low             17.0-63.0       [facility]  
   
                                        Comment on above:   Performed By: #### 1  
3264952, 7335145, 5995178, 7011379,   
7586002,   
[id number] ####[facility] (DEFAULT)[address] [place] [address]   
   
                                                    AST [Catalytic   
activity/Vol]   82 U/L          High            15-41           [facility]  
   
                                        Comment on above:   Performed By: #### 1  
8984387, 6488627, 0893454, 7395288,   
5982939,   
[id number] ####[facility] (DEFAULT)[address] [place] [address]   
   
                      Bili Total 1.0 mg/dL  Normal     0.3-1.2    [facility]  
   
                                        Comment on above:   Performed By: #### 1  
6182820, 3593835, 5847073, 9407055,   
5227702,   
[id number] ####[facility] (DEFAULT)[address] [place] [address]   
   
                      Calcium [Mass/Vol] 8.4 mg/dL  Low        8.9-10.3   [facility]  
   
                                        Comment on above:   Performed By: #### 1  
7669349, 9885696, 7332179, 4671658,   
7287503,   
[id number] ####[facility] (DEFAULT)[address] [place] [address]   
   
                      Chloride [Moles/Vol] 104 mmol/L Normal     101-111    [facility]  
   
                                        Comment on above:   Performed By: #### 1  
1784660, 5459760, 3906613, 8456334,   
1278339,   
[id number] ####[facility] (DEFAULT)[address] [place] [address]   
   
                      CO2 [Moles/Vol] 26 mmol/L  Normal     21-32      [facility]  
   
                                        Comment on above:   Performed By: #### 1  
4257915, 3705146, 6387139, 4005448,   
1960346,   
[id number] ####[facility] (DEFAULT)[address] [place] [address]   
   
                                                    Creatinine   
[Mass/Vol]      1.16 mg/dL      Normal          0.90-1.30       [facility]  
   
                                        Comment on above:   Performed By: #### 1  
3494158, 1213826, 3475562, 7211371,   
6719600,   
[id number] ####[facility] (DEFAULT)[address] [place] [address]   
   
                      Glucose [Mass/Vol] 124.0 mg/dL High       74.0-118.0 [facility]  
   
                                        Comment on above:   Performed By: #### 1  
2015186, 6586055, 5540987, 4143152,   
1534438,   
[id number] ####[facility] (DEFAULT)[address] [place] [address]   
   
                                                    Potassium   
[Moles/Vol]     3.8 mmol/L      Normal          3.6-5.1         [facility]  
   
                                        Comment on above:   Performed By: #### 1  
3891198, 5008367, 1665980, 1795345,   
2867675,   
[id number] ####[facility] (DEFAULT)[address] [place] [address]   
   
                      Protein [Mass/Vol] 6.0 g/dL   Low        6.5-8.1    [facility]  
   
                                        Comment on above:   Performed By: #### 1  
1113420, 8232344, 1310931, 5770797,   
7298630,   
[id number] ####[facility] (DEFAULT)[address] [place] [address]   
   
                      Sodium [Moles/Vol] 140.0 mmol/L Normal     136.0-144.0 [facility]  
   
                                        Comment on above:   Performed By: #### 1  
1466186, 5529476, 9188082, 8653044,   
3778739,   
[id number] ####[facility] (DEFAULT)[address] [place] [address]   
   
                                                    Urea nitrogen   
[Mass/Vol]      22 mg/dL        Normal          8-26            [facility]  
   
                                        Comment on above:   Performed By: #### 1  
1337139, 4790831, 4681743, 4886313,   
7496080,   
[id number] ####[facility] (DEFAULT)[address] [place] [address]   
   
                                                    Albumin/Globulin   
[Mass ratio]    1.0 {ratio}     Low             1.4-2.6         [facility]  
   
                                        Comment on above:   Performed By: #### 1  
2209402, 0403181, 2149975, 8633722,   
6162041,   
[id number] ####[facility] (DEFAULT)[address] [place] [address]   
   
                                                    Anion gap   
[Moles/Vol]     13.8 mmol/L     Normal          5.0-19.0        [facility]  
   
                                        Comment on above:   Performed By: #### 1  
6960315, 3602539, 5962699, 0216052,   
3918080,   
[id number] ####[facility] (DEFAULT)[address] [place] [address]   
   
                                                    Globulin (S)   
[Mass/Vol]      2.9 g/dL        Normal          1.5-4.3         [facility]  
   
                                        Comment on above:   Performed By: #### 1  
7589741, 1694385, 8235678, 8097960,   
2335728,   
[id number] ####[facility] (DEFAULT)[address] [place] [address]   
   
                                Osmolality      284 mOsm/L      Invalid   
Interpretation   
Code                                                [facility]  
   
                                        Comment on above:   Performed By: #### 1  
7062592, 4644697, 0995503, 5556989,   
8570693,   
[id number] ####[facility] (DEFAULT)[address] [place] [address]   
   
                                                    Urea   
nitrogen/Creatinine   
[Mass ratio]    18.9 mg/mg      High            4.6-16.2        [facility]  
   
                                        Comment on above:   Performed By: #### 1  
3603343, 2732557, 9762636, 1110310,   
1115435,   
[id number] ####[facility] (DEFAULT)[address] [place] [address]   
   
                                                    CRPon 2024   
   
                      CRP        8.3 mg/dL  High       <=0.5      [facility]  
   
                                        Comment on above:   Performed By: #### 1  
1663992, 2441278, 8018839, 0357505,   
2501889,   
[id number] ####[facility] (DEFAULT)[address] [place] [address]   
   
                                                    Education Noteon 2024   
   
                      Education Note            Normal                [facility]  
   
                                                    Inpatient Clinical Summaryon  
 2024   
   
                                                    Inpatient Clinical   
Summary                         Normal                          [facility]  
   
                                                    Inpatient Patient Summaryon   
2024   
   
                                                    Inpatient Patient   
Summary                         Normal                          [facility]  
   
                                                    Myoglobinon 2024   
   
                      Myoglobin [Mass/Vol] 228.5 ng/mL High       17.4-105.7 [facility]  
   
                                        Comment on above:   Performed By: #### 1  
7496087, 2058664, 0119801, 9820297,   
9857546,   
[id number] ####[facility] (DEFAULT)[address] [place] [address]   
   
                                                    Pharmacy Noteon 2024   
   
                      Pharmacy Note            Normal                [facility]  
   
                                                    Progress Note - Nurseon    
   
                                                    Progress Note -   
Nurse                           [facility]  
   
                                                    Telemetry Stripson    
   
                                        Telemetry Strips    [id number]  
21  
[id number]#1.00O  
TGTIFF              [facility]  
   
                                                    .Auto Diff 1on 03-   
   
                      Auto Mono % 11 %       Normal            [facility]  
   
                                        Comment on above:   Performed By: #### 2  
139433, 6228210, 2166722, 9721100632,   
2474018267, 28261021, 2179532 ####[facility] (DEFAULT)[address] [place] [address]   
   
                      Baso Abs#  0.0 x10    Normal     0.0-0.2    [facility]  
   
                                        Comment on above:   Performed By: #### 2  
987640, 1900916, 1575802, 5457949908,   
0422328086, 08099715, 0987666 ####[facility] (DEFAULT)[address] [place] [address]   
   
                                                    Basophils/100 WBC   
(Bld)           0.2 %           Normal          0.2-2.0         [facility]  
   
                                        Comment on above:   Performed By: #### 2  
720792, 8554792, 6260836, 0616399031,   
8148294739, 25921636, 5871702 ####[facility] (DEFAULT)[address] [place] [address]   
   
                      Eos Abs#   0.0 x10    Normal     0.0-0.4    [facility]  
   
                                        Comment on above:   Performed By: #### 2  
890224, 4769648, 9824615, 5343485066,   
9855983483, 32105426, 4884163 ####[facility] (DEFAULT)[address] [place] [address]   
   
                                                    Eosinophils/100 WBC   
(Bld)           0.7 %           Low             0.9-4.0         [facility]  
   
                                        Comment on above:   Performed By: #### 2  
634219, 5773558, 5396746, 3247401297,   
9309597584, 19054999, 0882128 ####[facility] (DEFAULT)[address] [place] [address]   
   
                      Lymph Abs# 0.5 x10    Low        1.3-2.9    [facility]  
   
                                        Comment on above:   Performed By: #### 2  
190988, 8505040, 3506365, 3247156221,   
7537632162, 39473837, 9826022 ####[facility] (DEFAULT)[address] [place] [address]   
   
                                                    Lymphocytes/100 WBC   
(Bld)           12 %            Low             14-48           [facility]  
   
                                        Comment on above:   Performed By: #### 2  
608348, 4645322, 5799435, 8784344526,   
0747576344, 44938904, 3113713 ####[facility] (DEFAULT)[address] [place] [address]   
   
                      Mono Abs#  0.5 x10    Normal     0.0-0.8    [facility]  
   
                                        Comment on above:   Performed By: #### 2  
865373, 4656028, 9514922, 1659902185,   
8425108740, 18034219, 5138626 ####[facility] (DEFAULT)[address] [place] [address]   
   
                      Neut Abs#  3.4 x10    Normal     1.5-9.2    [facility]  
   
                                        Comment on above:   Performed By: #### 2  
410548, 5842689, 7631719, 7419366046,   
9410802658, 32607194, 3029552 ####[facility] (DEFAULT)[address] [place] [address]   
   
                                                    Neutrophils/100 WBC   
(Bld)           76 %            Normal          44-88           [facility]  
   
                                        Comment on above:   Performed By: #### 2  
653531, 8599587, 5490389, 7977130807,   
1646158116, 33653934, 0684769 ####[facility] (DEFAULT)[address] [place] [address]   
   
                                                    BMP Standardon 03-   
   
                                eGFR Non AA     60 mL/min/1.73m2 Invalid   
Interpretation   
Code                                                [facility]  
   
                                        Comment on above:   Performed By: #### 2  
519930, 5412836, 0491692, 9291118829,   
1097127696, 81814469, 2916518 ####[facility] (DEFAULT)[address] [place] [address]   
   
                                eGFR AA         >60             Invalid   
Interpretation   
Code                                                [facility]  
   
                                        Comment on above:   Performed By: #### 2  
811781, 8266488, 2602356, 3895158086,   
4596165632, 50109181, 3725527 ####[facility] (DEFAULT)[address] [place] [address]   
   
                                                    Anion gap   
[Moles/Vol]     11.7 mmol/L     Normal          5.0-19.0        [facility]  
   
                                        Comment on above:   Performed By: #### 2  
233109, 9550004, 4059436, 0077293710,   
6032225613, 13891715, 5711759 ####[facility] (DEFAULT)[address] [place] [address]   
   
                      Calcium [Mass/Vol] 8.2 mg/dL  Low        8.9-10.3   [facility]  
   
                                        Comment on above:   Performed By: #### 2  
786215, 3397132, 6269681, 5669652900,   
1763664885, 29157982, 3384685 ####[facility] (DEFAULT)[address] [place] [address]   
   
                      Chloride [Moles/Vol] 107 mmol/L Normal     101-111    [facility]  
   
                                        Comment on above:   Performed By: #### 2  
790788, 5056451, 7804627, 1232921428,   
7730709782, 54692059, 8296087 ####[facility] (DEFAULT)[address] [place] [address]   
   
                      CO2 [Moles/Vol] 26 mmol/L  Normal     21-32      [facility]  
   
                                        Comment on above:   Performed By: #### 2  
328011, 8282404, 1659462, 1426966870,   
7990990953, 98708997, 3716984 ####[facility] (DEFAULT)[address] [place] [address]   
   
                                                    Creatinine   
[Mass/Vol]      1.15 mg/dL      Normal          0.90-1.30       [facility]  
   
                                        Comment on above:   Performed By: #### 2  
474297, 0954882, 1112004, 9373984112,   
5910723049, 23635179, 9534951 ####[facility] (DEFAULT)[address] [place] [address]   
   
                      Glucose [Mass/Vol] 112.0 mg/dL Normal     74.0-118.0 [facility]  
   
                                        Comment on above:   Performed By: #### 2  
737977, 1737402, 9274416, 9717000350,   
4229797019, 24866728, 2052726 ####[facility] (DEFAULT)[address] [place] [address]   
   
                                Osmolality      284 mOsm/L      Invalid   
Interpretation   
Code                                                [facility]  
   
                                        Comment on above:   Performed By: #### 2  
298876, 1865454, 5083944, 2416929926,   
8836010907, 17418582, 7421838 ####[facility] (DEFAULT)[address] [place] [address]   
   
                                                    Potassium   
[Moles/Vol]     3.7 mmol/L      Normal          3.6-5.1         [facility]  
   
                                        Comment on above:   Performed By: #### 2  
215428, 5294024, 0390107, 0524333644,   
5909178047, 77528060, 3796302 ####[facility] (DEFAULT)[address] [place] [address]   
   
                      Sodium [Moles/Vol] 141.0 mmol/L Normal     136.0-144.0 [facility]  
   
                                        Comment on above:   Performed By: #### 2  
057199, 1934083, 2030757, 2989502227,   
9593825211, 99829712, 2967876 ####[facility] (DEFAULT)[address] [place] [address]   
   
                                                    Urea nitrogen   
[Mass/Vol]      19 mg/dL        Normal          8-26            [facility]  
   
                                        Comment on above:   Performed By: #### 2  
814336, 7211290, 3875940, 5701873141,   
8653661185, 28134656, 9450143 ####[facility] (DEFAULT)[address] [place] [address]   
   
                                                    Urea   
nitrogen/Creatinine   
[Mass ratio]    16.5 mg/mg      High            4.6-16.2        [facility]  
   
                                        Comment on above:   Performed By: #### 2  
710068, 4466606, 3622883, 7582917186,   
7579662266, 48933992, 3596072 ####[facility] (DEFAULT)[address] [place] [address]   
   
                                                    CBC w/ Auto Diffon 03-  
4   
   
                                                    Erythrocyte   
distribution width   
(RBC) [Ratio]   15.0 %          Normal          11.5-15.0       [facility]  
   
                                        Comment on above:   Performed By: #### 2  
436621, 2998519, 6647833, 9244776416,   
0914080140, 97970940, 7691423 ####[facility] (DEFAULT)[address] [place] [address]   
   
                                                    Hematocrit (Bld)   
[Volume fraction] 37.4 %          Normal          34.8-51.9       [facility]  
   
                                        Comment on above:   Performed By: #### 2  
347794, 5950581, 0218259, 4676435299,   
7536625733, 31686708, 4061519 ####[facility] (DEFAULT)[address] [place] [address]   
   
                                                    Hemoglobin (Bld)   
[Mass/Vol]      12.5 g/dL       Normal          11.8-17.7       [facility]  
   
                                        Comment on above:   Performed By: #### 2  
284287, 5118955, 8304291, 2071308054,   
4999494774, 54885390, 3795645 ####[facility] (DEFAULT)[address] [place] [address]   
   
                                Man Diff?       Auto            Invalid   
Interpretation   
Code                                                [facility]  
   
                                        Comment on above:   Performed By: #### 2  
575599, 3374919, 2838887, 7782802768,   
[id number], 27386196, 1773675 ####[facility] (DEFAULT)[address] [place] [address]   
   
                                                    MCH (RBC) [Entitic   
mass]           30 pg           Normal          24-34           [facility]  
   
                                        Comment on above:   Performed By: #### 2  
141589, 2130392, 2321558, 5916558038,   
4336544443, 74108236, 6704590 ####[facility] (DEFAULT)[address] [place] [address]   
   
                                                    MCHC (RBC)   
[Mass/Vol]      33 g/dL         Normal          26-37           [facility]  
   
                                        Comment on above:   Performed By: #### 2  
201092, 8172738, 1959119, 2653436561,   
1102798855, 56857502, 7950572 ####[facility] (DEFAULT)[address] [place] [address]   
   
                                                    MCV (RBC) [Entitic   
vol]            89 fL           Normal                    [facility]  
   
                                        Comment on above:   Performed By: #### 2  
547779, 9178978, 7813945, 3858594161,   
4132554880, 95850762, 7184015 ####[facility] (DEFAULT)[address] [place] [address]   
   
                      Platelet   134 x10    Low        138-427    [facility]  
   
                                        Comment on above:   Performed By: #### 2  
298748, 9389838, 4823676, 1980155751,   
2617078786, 39429252, 5419474 ####[facility] (DEFAULT)[address] [place] [address]   
   
                                                    Platelet mean volume   
(Bld) [Entitic vol] 8.4 fL          Normal          6.3-10.2        [facility]  
   
                                        Comment on above:   Performed By: #### 2  
299495, 5193225, 9617756, 4302278027,   
2598569946, 50879860, 1928302 ####[facility] (DEFAULT)[address] [place] [address]   
   
                      RBC        4.20 x10   Normal     3.70-5.30  [facility]  
   
                                        Comment on above:   Performed By: #### 2  
571050, 6216637, 3575446, 6231086510,   
2398297691, 06275306, 4365891 ####[facility] (DEFAULT)[address] [place] [address]   
   
                      WBC        4.5 x10    Normal     3.5-10.5   [facility]  
   
                                        Comment on above:   Performed By: #### 2  
168710, 7304752, 3937531, 5738487266,   
5407529025, 01850659, 7819814 ####[facility] (DEFAULT)[address] [place] [address]   
   
                                                    CKon 03-   
   
                                                    CK [Catalytic   
activity/Vol]   470 U/L         High                      [facility]  
   
                                        Comment on above:   Performed By: #### 2  
400049, 2847727, 1449273, 9789941236,   
3241641319, 11777206, 3803129 ####[facility] (DEFAULT)[address] [place] [address]   
   
                                                    CMP Standardon 03-   
   
                      Albumin [Mass/Vol] 2.9 g/dL   Low        3.5-5.0    [facility]  
   
                                        Comment on above:   Performed By: #### 2  
066324, 6393495, 1567469, 1316287535,   
9108615619, 36493524, 5833557 ####[facility] (DEFAULT)[address] [place] [address]   
   
                                                    Albumin/Globulin   
[Mass ratio]    1.1 {ratio}     Low             1.4-2.6         [facility]  
   
                                        Comment on above:   Performed By: #### 2  
015395, 7834212, 5817915, 4711834141,   
5832540760, 99859379, 2585404 ####[facility] (DEFAULT)[address] [place] [address]   
   
                      Alk Phos   50 IU/L    Normal     32-91      [facility]  
   
                                        Comment on above:   Performed By: #### 2  
380854, 7457235, 8025777, 4472874810,   
1578571845, 31696545, 6928422 ####[facility] (DEFAULT)[address] [place] [address]   
   
                                                    ALT [Catalytic   
activity/Vol]   7.0 U/L         Low             17.0-63.0       [facility]  
   
                                        Comment on above:   Performed By: #### 2  
770481, 0868848, 1602613, 2111753455,   
1001250729, 53963125, 0524987 ####[facility] (DEFAULT)[address] [place] [address]   
   
                                                    AST [Catalytic   
activity/Vol]   82 U/L          High            15-41           [facility]  
   
                                        Comment on above:   Performed By: #### 2  
656711, 5529032, 7442947, 6618952607,   
3262497195, 31949216, 9486016 ####[facility] (DEFAULT)[address] [place] [address]   
   
                      Bili Total 0.9 mg/dL  Normal     0.3-1.2    [facility]  
   
                                        Comment on above:   Performed By: #### 2  
634213, 6423260, 3982637, 6210409679,   
5822848453, 76219617, 7479843 ####[facility] (DEFAULT)[address] [place] [address]   
   
                                                    Globulin (S)   
[Mass/Vol]      2.6 g/dL        Normal          1.5-4.3         [facility]  
   
                                        Comment on above:   Performed By: #### 2  
742346, 7514740, 8105875, 1415594449,   
0108376786, 35934164, 9150128 ####[facility] (DEFAULT)[address] [place] [address]   
   
                      Protein [Mass/Vol] 5.5 g/dL   Low        6.5-8.1    [facility]  
   
                                        Comment on above:   Performed By: #### 2  
878009, 7488263, 6107881, 1850314714,   
5120125993, 36642336, 6068227 ####[facility] (DEFAULT)[address] [place] [address]   
   
                                                    CRPon 03-   
   
                      CRP        6.2 mg/dL  High       <=0.5      [facility]  
   
                                        Comment on above:   Performed By: #### 2  
981109, 5417098, 1330595, 7523675362,   
7869678414, 74785283, 9571546 ####[facility] (DEFAULT)[address] [place] [address]   
   
                                                    Myoglobinon 03-   
   
                      Myoglobin [Mass/Vol] 259.9 ng/mL High       17.4-105.7 [facility]  
   
                                        Comment on above:   Performed By: #### 2  
843078, 2862726, 1408875, 2136178306,   
8971380258, 68840289, 9879978 ####[facility] (DEFAULT)[address] [place] [address]   
   
                                                    UA Prnwu3mw 03-   
   
                      UA Amorph. Rare       Normal                [facility]  
   
                                        Comment on above:   Order Comment: Urina  
lysis Microscopic order added on by   
Discern   
Expert Rules system.   
   
                                                            Performed By: #### 6  
725010, 67927586, 5335042954 ####[facility] (DEFAULT)[address] [place] [address]   
   
                      UA Bacteria Trace      [facility]  
   
                                        Comment on above:   Order Comment: Urina  
lysis Microscopic order added on by   
Discern   
Expert Rules system.   
   
                                                            Performed By: #### 6  
035769, 82791738, [id number] ####[facility] (DEFAULT)[address] [place] [address]   
   
                      UA Gran Cast 0-5        [facility]  
   
                                        Comment on above:   Order Comment: Urina  
lysis Microscopic order added on by   
Discern   
Expert Rules system.   
   
                                                            Performed By: #### 6  
716341, 50738062, 9756977154 ####[facility] (DEFAULT)[address] [place] [address]   
   
                      UA Hyal Cast 0-5        [facility]  
   
                                        Comment on above:   Order Comment: Urina  
lysis Microscopic order added on by   
Discern   
Expert Rules system.   
   
                                                            Performed By: #### 6  
528403, 83970297, [id number] ####[facility] (DEFAULT)[address] [place] [address]   
   
                      UA RBC     20-30      [facility]  
   
                                        Comment on above:   Order Comment: Urina  
lysis Microscopic order added on by   
Discern   
Expert Rules system.   
   
                                                            Performed By: #### 6  
078189, 08064064, [id number] ####[facility] (DEFAULT)[address] [place] [address]   
   
                      UA Squam Epi Rare       [facility]  
   
                                        Comment on above:   Order Comment: Urina  
lysis Microscopic order added on by   
Discern   
Expert Rules system.   
   
                                                            Performed By: #### 6  
183839, 99512472, [id number] ####[facility] (DEFAULT)[address] [place] [address]   
   
                      UA WBC     3-5        [facility]  
   
                                        Comment on above:   Order Comment: Urina  
lysis Microscopic order added on by   
Discern   
Expert Rules system.   
   
                                                            Performed By: #### 6  
016863, 38550986, [id number] ####[facility] (DEFAULT)[address] [place] [address]   
   
                                                    UA w Culture if Ind Standard  
on 03-   
   
                      Breakpoint UA ********   [facility]  
   
                                        Comment on above:   Performed By: #### 6  
497748, 13169508, [id number] ####[facility] (DEFAULT)[address] [place] [address]   
   
                      Color (U)  Yellow     Normal                [facility]  
   
                                        Comment on above:   Performed By: #### 6  
184666, 99778936, 7870825036 ####[facility] (DEFAULT)[address] [place] [address]   
   
                                Culture?        Indicated       Invalid   
Interpretation   
Code                                                [facility]  
   
                                        Comment on above:   Result Comment: Resu  
lt created by rule GL_MAGR_ADD_UA_CULT   
Result   
created by rule GL_MAGR_ADD_UA_CULT Result created by rule   
GL_MAGR_ADD_UA_CULT1 Result created by rule GL_MAGR_ADD_UA_CULT   
   
                                                            Performed By: #### 6  
638291, 43920122, [id number] ####[facility] (DEFAULT)[address] [place] [address]   
   
                                                    Glucose (U)   
[Mass/Vol]      Negative        Normal                          [facility]  
   
                                        Comment on above:   Performed By: #### 6  
368711, 68960431, 1727552922 ####[facility] (DEFAULT)[address] [place] [address]   
   
                      Ketones Ql (U) Negative   Normal                [facility]  
   
                                        Comment on above:   Performed By: #### 6  
008177, 17868197, 1797236704 ####[facility] (DEFAULT)[address] [place] [address]   
   
                                Micro?          Indicated       Invalid   
Interpretation   
Code                                                [facility]  
   
                                        Comment on above:   Result Comment: Resu  
lt created by rule GL_MAGR_ADD_UA_MICRO   
   
                                                            Performed By: #### 6  
077361, 88121686, 3086208735 ####[facility] (DEFAULT)[address] [place] [address]   
   
                      UA Bilirubin Negative   Normal                [facility]  
   
                                        Comment on above:   Performed By: #### 6  
348176, 95093786, 3564429629 ####[facility] (DEFAULT)[address] [place] [address]   
   
                      UA Blood   MODERATE   Abnormal   NEGATIVE   [facility]  
   
                                        Comment on above:   Performed By: #### 6  
945175, 67445824, 4815561457 ####[facility] (DEFAULT)[address] [place] [address]   
   
                      UA Clarity CLEAR      Normal     CLEAR      [facility]  
   
                                        Comment on above:   Performed By: #### 6  
428672, 68551884, 4080084949 ####[facility] (DEFAULT)[address] [place] [address]   
   
                      UA Leuk Est Negative   Normal     NEGATIVE   [facility]  
   
                                        Comment on above:   Performed By: #### 6  
993970, 04042012, [id number] ####[facility] (DEFAULT)[address] [place] [address]   
   
                      UA Nitrite Negative   Normal     NEGATIVE   [facility]  
   
                                        Comment on above:   Performed By: #### 6  
056215, 30924194, [id number] ####[facility] (DEFAULT)[address] [place] [address]   
   
                      UA pH      7.5        Normal     5-8        [facility]  
   
                                        Comment on above:   Performed By: #### 6  
818453, 83029761, 1878484598 ####[facility] (DEFAULT)[address] [place] [address]   
   
                      UA Protein Negative   Normal     NEGATIVE   [facility]  
   
                                        Comment on above:   Performed By: #### 6  
108076, 04619341, [id number] ####[facility] (DEFAULT)[address] [place] [address]   
   
                      UA Spec Grav 1.015      Normal     1.001-1.035 [facility]  
   
                                        Comment on above:   Performed By: #### 6  
355324, 95442197, 9854038666 ####[facility] (DEFAULT)[address] [place] [address]   
   
                      UA Urobilinogen 0.2 mg/dL  Normal     0.2-1.0    [facility]  
   
                                        Comment on above:   Performed By: #### 6  
244799, 81049686, 6460841495 ####[facility] (DEFAULT)[address] [place] [address]   
   
                      Urine Source Clean Catch Normal                [facility]  
   
                                        Comment on above:   Performed By: #### 6  
346062, 33531154, 8843035454 ####[facility] (DEFAULT)[address] [place] [address]   
   
                                                    XR Humerus Lefton 03-  
   
   
                      XR Humerus Left            Normal                [facility]  
   
                                                    .Auto Diff 1on 2024   
   
                      Auto Mono % 12 %       Normal     -12       [facility]  
   
                                        Comment on above:   Performed By: #### 1  
[id number], 4022403, 1796375, 69182250,   
3252801, 3465457 ####[facility] (DEFAULT)[address] [place] [address]   
   
                      Baso Abs#  0.0 x10    Normal     0.0-0.2    [facility]  
   
                                        Comment on above:   Performed By: #### 1  
899189339, 4674725, 6743986, 18075048,   
9650842, 8130714 ####[facility] (DEFAULT)[address] [place] [address]   
   
                                                    Basophils/100 WBC   
(Bld)           0.2 %           Normal          0.2-2.0         [facility]  
   
                                        Comment on above:   Performed By: #### 1  
501787003, 5257024, 7209810, 69794731,   
1405465, 2242905 ####[facility] (DEFAULT)[address] [place] [address]   
   
                      Eos Abs#   0.0 x10    Normal     0.0-0.4    [facility]  
   
                                        Comment on above:   Performed By: #### 1  
485303864, 8451501, 8925522, 30450253,   
3727195, 7027541 ####[facility] (DEFAULT)[address] [place] [address]   
   
                                                    Eosinophils/100 WBC   
(Bld)           1.0 %           Normal          0.9-4.0         [facility]  
   
                                        Comment on above:   Performed By: #### 1  
527024253, 8410087, 6045233, 71401708,   
2526695, 2996513 ####[facility] (DEFAULT)[address] [place] [address]   
   
                      Lymph Abs# 0.5 x10    Low        1.3-2.9    [facility]  
   
                                        Comment on above:   Performed By: #### 1  
639689775, 6170681, 8962893, 32020864,   
5636526, 5958782 ####[facility] (DEFAULT)[address] [place] [address]   
   
                                                    Lymphocytes/100 WBC   
(Bld)           16 %            Normal          14-48           [facility]  
   
                                        Comment on above:   Performed By: #### 1  
[id number], 1375321, 7737865, 15049754,   
6569675, 6678805 ####[facility] (DEFAULT)[address] [place] [address]   
   
                      Mono Abs#  0.4 x10    Normal     0.0-0.8    [facility]  
   
                                        Comment on above:   Performed By: #### 1  
780954827, 7724967, 8721845, 58308289,   
0095503, 1230875 ####[facility] (DEFAULT)[address] [place] [address]   
   
                      Neut Abs#  2.3 x10    Normal     1.5-9.2    [facility]  
   
                                        Comment on above:   Performed By: #### 1  
374149559, 0595256, 4451708, 48726361,   
5916214, 6691031 ####[facility] (DEFAULT)[address] [place] [address]   
   
                                                    Neutrophils/100 WBC   
(Bld)           71 %            Normal          44-88           [facility]  
   
                                        Comment on above:   Performed By: #### 1  
110647673, 6720514, 5915169, 25994135,   
4946695, 4012890 ####[facility] (DEFAULT)[address] [place] [address]   
   
                                                    CBC w/ Auto Diffon   
4   
   
                                                    Erythrocyte   
distribution width   
(RBC) [Ratio]   15.2 %          High            11.5-15.0       [facility]  
   
                                        Comment on above:   Performed By: #### 1  
041497634, 9248433, 4634309, 23584042,   
9483585, 4459023 ####[facility] (DEFAULT)[address] [place] [address]   
   
                                                    Hematocrit (Bld)   
[Volume fraction] 33.9 %          Low             34.8-51.9       [facility]  
   
                                        Comment on above:   Performed By: #### 1  
[id number], 4435062, 8723296, 20388366,   
3595030, 4122434 ####[facility] (DEFAULT)[address] [place] [address]   
   
                                                    Hemoglobin (Bld)   
[Mass/Vol]      11.5 g/dL       Low             11.8-17.7       [facility]  
   
                                        Comment on above:   Performed By: #### 1  
563427968, 8382038, 4355981, 36115107,   
0210347, 3847987 ####[facility] (DEFAULT)[address] [place] [address]   
   
                                Man Diff?       Auto            Invalid   
Interpretation   
Code                                                [facility]  
   
                                        Comment on above:   Performed By: #### 1  
[id number], 9171714, 3593699, 80586740,   
4629466, 4776926 ####[facility] (DEFAULT)[address] [place] [address]   
   
                                                    MCH (RBC) [Entitic   
mass]           30 pg           Normal          24-34           [facility]  
   
                                        Comment on above:   Performed By: #### 1  
533455012, 8493248, 1041080, 26702555,   
8386374, 0965076 ####[facility] (DEFAULT)[address] [place] [address]   
   
                                                    MCHC (RBC)   
[Mass/Vol]      34 g/dL         Normal          26-37           [facility]  
   
                                        Comment on above:   Performed By: #### 1  
650342074, 9739812, 6918173, 81819713,   
9675219, 0598643 ####[facility] (DEFAULT)[address] [place] [address]   
   
                                                    MCV (RBC) [Entitic   
vol]            88 fL           Normal                    [facility]  
   
                                        Comment on above:   Performed By: #### 1  
513917523, 3803087, 5963948, 71180218,   
7812055, 0333695 ####[facility] (DEFAULT)[address] [place] [address]   
   
                      Platelet   125 x10    Low        138-427    [facility]  
   
                                        Comment on above:   Performed By: #### 1  
851686333, 4827387, 6665102, 15695496,   
5719215, 6299315 ####[facility] (DEFAULT)[address] [place] [address]   
   
                                                    Platelet mean volume   
(Bld) [Entitic vol] 8.6 fL          Normal          6.3-10.2        [facility]  
   
                                        Comment on above:   Performed By: #### 1  
575695439, 0222967, 9607188, 93225378,   
2977344, 6399506 ####[facility] (DEFAULT)[address] [place] [address]   
   
                      RBC        3.85 x10   Normal     3.70-5.30  [facility]  
   
                                        Comment on above:   Performed By: #### 1  
590153873, 5935769, 5671536, 61173865,   
5711357, 4492894 ####[facility] (DEFAULT)[address] [place] [address]   
   
                      WBC        3.2 x10    Low        3.5-10.5   [facility]  
   
                                        Comment on above:   Performed By: #### 1  
167197898, 4411321, 8269533, 62090683,   
1804655, 8633150 ####[facility] (DEFAULT)[address] [place] [address]   
   
                                                    CKon 2024   
   
                                                    CK [Catalytic   
activity/Vol]   675 U/L         High                      [facility]  
   
                                        Comment on above:   Performed By: #### 1  
[id number], 1990457, 1545177, 11042920,   
9841969, 5889727 ####[facility] (DEFAULT)[address] [place] [address]   
   
                                                    CMP Standardon 2024   
   
                                eGFR Non AA     >60             Invalid   
Interpretation   
Code                                                [facility]  
   
                                        Comment on above:   Performed By: #### 1  
[id number], 8831340, 2657422, 04602118,   
1449126, 6437750 ####[facility] (DEFAULT)[address] [place] [address]   
   
                                eGFR AA         >60             Invalid   
Interpretation   
Code                                                [facility]  
   
                                        Comment on above:   Performed By: #### 1  
[id number], 3426879, 7405443, 98174407,   
9260305, 9430250 ####[facility] (DEFAULT)[address] [place] [address]   
   
                      Albumin [Mass/Vol] 2.7 g/dL   Low        3.5-5.0    [facility]  
   
                                        Comment on above:   Performed By: #### 1  
[id number], 6403465, 9384459, 04807861,   
0974120, 9627194 ####[facility] (DEFAULT)[address] [place] [address]   
   
                                                    Albumin/Globulin   
[Mass ratio]    1.1 {ratio}     Low             1.4-2.6         [facility]  
   
                                        Comment on above:   Performed By: #### 1  
[id number], 6238970, 9796277, 83614954,   
1191100, 8718689 ####[facility] (DEFAULT)[address] [place] [address]   
   
                      Alk Phos   47 IU/L    Normal     32-91      [facility]  
   
                                        Comment on above:   Performed By: #### 1  
[id number], 8355324, 4555100, 90100195,   
6931589, 6141538 ####[facility] (DEFAULT)[address] [place] [address]   
   
                                                    ALT [Catalytic   
activity/Vol]   8.0 U/L         Low             17.0-63.0       [facility]  
   
                                        Comment on above:   Performed By: #### 1  
[id number], 7658747, 5420162, 80790918,   
3357961, 0558428 ####[facility] (DEFAULT)[address] [place] [address]   
   
                                                    Anion gap   
[Moles/Vol]     7.8 mmol/L      Normal          5.0-19.0        [facility]  
   
                                        Comment on above:   Performed By: #### 1  
[id number], 6186068, 4335183, 35412107,   
6130505, 1528834 ####[facility] (DEFAULT)[address] [place] [address]   
   
                                                    AST [Catalytic   
activity/Vol]   90 U/L          High            15-41           [facility]  
   
                                        Comment on above:   Performed By: #### 1  
[id number], 6420603, 6450630, 77694470,   
7389483, 5018149 ####[facility] (DEFAULT)[address] [place] [address]   
   
                      Bili Total 0.7 mg/dL  Normal     0.3-1.2    [facility]  
   
                                        Comment on above:   Performed By: #### 1  
[id number], 6941591, 5268787, 68004918,   
2329091, 8614123 ####[facility] (DEFAULT)[address] [place] [address]   
   
                      Calcium [Mass/Vol] 7.9 mg/dL  Low        8.9-10.3   [facility]  
   
                                        Comment on above:   Performed By: #### 1  
[id number], 2728936, 5065974, 30048371,   
0453343, 6313711 ####[facility] (DEFAULT)[address] [place] [address]   
   
                      Chloride [Moles/Vol] 110 mmol/L Normal     101-111    [facility]  
   
                                        Comment on above:   Performed By: #### 1  
913063828, 6388497, 3655329, 30642907,   
5670307, 0348939 ####[facility] (DEFAULT)[address] [place] [address]   
   
                      CO2 [Moles/Vol] 27 mmol/L  Normal     21-32      [facility]  
   
                                        Comment on above:   Performed By: #### 1  
[id number], 1896711, 8802945, 94070959,   
3687062, 2511009 ####[facility] (DEFAULT)[address] [place] [address]   
   
                                                    Creatinine   
[Mass/Vol]      1.13 mg/dL      Normal          0.90-1.30       [facility]  
   
                                        Comment on above:   Performed By: #### 1  
[id number], 4519504, 1425818, 48681597,   
0826815, 9916685 ####[facility] (DEFAULT)[address] [place] [address]   
   
                                                    Globulin (S)   
[Mass/Vol]      2.4 g/dL        Normal          1.5-4.3         [facility]  
   
                                        Comment on above:   Performed By: #### 1  
[id number], 9990147, 4731987, 38594205,   
6846435, 1334245 ####[facility] (DEFAULT)[address] [place], [place] [address]   
   
                      Glucose [Mass/Vol] 101.0 mg/dL Normal     74.0-118.0 [facility]  
   
                                        Comment on above:   Performed By: #### 1  
511847162, 7357447, 4937178, 01386831,   
2881121, 4367559 ####[facility] (DEFAULT)[address] [place] [address]   
   
                                Osmolality      284 mOsm/L      Invalid   
Interpretation   
Code                                                [facility]  
   
                                        Comment on above:   Performed By: #### 1  
[id number], 3098271, 7593399, 13327584,   
2550015, 9113022 ####[facility] (DEFAULT)[address] [place] [address]   
   
                                                    Potassium   
[Moles/Vol]     3.8 mmol/L      Normal          3.6-5.1         [facility]  
   
                                        Comment on above:   Performed By: #### 1  
[id number], 4428064, 3534923, 26589969,   
6250802, 0091323 ####[facility] (DEFAULT)[address] [place] [address]   
   
                      Protein [Mass/Vol] 5.1 g/dL   Low        6.5-8.1    [facility]  
   
                                        Comment on above:   Performed By: #### 1  
128140508, 3524918, 4936368, 98727362,   
6141471, 1281353 ####[facility] (DEFAULT)[address] [place] [address]   
   
                      Sodium [Moles/Vol] 141.0 mmol/L Normal     136.0-144.0 [facility]  
   
                                        Comment on above:   Performed By: #### 1  
836791705, 2154777, 6424741, 81905631,   
2693235, 2132282 ####[facility] (DEFAULT)[address] [place] [address]   
   
                                                    Urea nitrogen   
[Mass/Vol]      19 mg/dL        Normal          8-26            [facility]  
   
                                        Comment on above:   Performed By: #### 1  
[id number], 3702409, 7011057, 34714780,   
3608230, 6005345 ####[facility] (DEFAULT)[address] [place] [address]   
   
                                                    Urea   
nitrogen/Creatinine   
[Mass ratio]    16.8 mg/mg      High            4.6-16.2        [facility]  
   
                                        Comment on above:   Performed By: #### 1  
[id number], 7791629, 7084715, 24960747,   
0812548, 6860105 ####[facility] (DEFAULT)[address] [place] [address]   
   
                                                    CRPon 2024   
   
                      CRP        5.5 mg/dL  High       <=0.5      [facility]  
   
                                        Comment on above:   Performed By: #### 1  
428506006, 2882663, 1457144, 41809493,   
3286609, 2070812 ####[facility] (DEFAULT)[address] [place] [address]   
   
                                                    Myoglobinon 2024   
   
                      Myoglobin [Mass/Vol] 335.9 ng/mL High       17.4-105.7 [facility]  
   
                                        Comment on above:   Performed By: #### 1  
[id number], 5842733, 5938261, 97807669,   
2646513, 4347024 ####[facility] (DEFAULT)[address] [place] [address]   
   
                                                    .Auto Diff 1on 2024   
   
                      Auto Mono % 13 %       High       1-12       [facility]  
   
                                        Comment on above:   Performed By: #### 7  
304868, 4056853, 4705141, 5850806935,   
5340974, 09085828 ####[facility] (DEFAULT)[address] [place] [address]   
   
                      Baso Abs#  0.0 x10    Normal     0.0-0.2    [facility]  
   
                                        Comment on above:   Performed By: #### 7  
754322, 3338267, 3930147, 3572640149,   
3912517, 92196213 ####[facility] (DEFAULT)[address] [place] [address]   
   
                                                    Basophils/100 WBC   
(Bld)           0.3 %           Normal          0.2-2.0         [facility]  
   
                                        Comment on above:   Performed By: #### 7  
181248, 5360996, 5639965, 0824732233,   
4890737, 22302606 ####[facility] (DEFAULT)[address] [place] [address]   
   
                      Eos Abs#   0.0 x10    Normal     0.0-0.4    [facility]  
   
                                        Comment on above:   Performed By: #### 7  
577103, 2436539, 5166041, 4172392767,   
0037176, 66447677 ####[facility] (DEFAULT)[address] [place] [address]   
   
                                                    Eosinophils/100 WBC   
(Bld)           0.3 %           Low             0.9-4.0         [facility]  
   
                                        Comment on above:   Performed By: #### 7  
568862, 0640143, 9021836, 6139405178,   
5194510, 25175042 ####[facility] (DEFAULT)[address] [place] [address]   
   
                      Lymph Abs# 0.5 x10    Low        1.3-2.9    [facility]  
   
                                        Comment on above:   Performed By: #### 7  
476439, 3560940, 5436411, 7523031301,   
1326367, 97644001 ####[facility] (DEFAULT)[address] [place] [address]   
   
                                                    Lymphocytes/100 WBC   
(Bld)           15 %            Normal          14-48           [facility]  
   
                                        Comment on above:   Performed By: #### 7  
969820, 8994927, 2344918, 0191592804,   
9439645, 14741376 ####[facility] (DEFAULT)[address] [place] [address]   
   
                      Mono Abs#  0.5 x10    Normal     0.0-0.8    [facility]  
   
                                        Comment on above:   Performed By: #### 7  
793124, 4542734, 0050704, 4894327827,   
0329468, 88410560 ####[facility] (DEFAULT)[address] [place] [address]   
   
                      Neut Abs#  2.4 x10    Normal     1.5-9.2    [facility]  
   
                                        Comment on above:   Performed By: #### 7  
520776, 4344578, 4254653, 2844251915,   
7895957, 99632391 ####[facility] (DEFAULT)[address] [place] [address]   
   
                                                    Neutrophils/100 WBC   
(Bld)           71 %            Normal          44-88           [facility]  
   
                                        Comment on above:   Performed By: #### 7  
649502, 9486626, 2267299, 6540141718,   
7699485, 37170852 ####[facility] (DEFAULT)[address] [place] [address]   
   
                                                    CBC w/ Auto Diffon   
4   
   
                                                    Erythrocyte   
distribution width   
(RBC) [Ratio]   15.6 %          High            11.5-15.0       [facility]  
   
                                        Comment on above:   Performed By: #### 7  
845228, 8808608, 8136287, 1559007054,   
3893933, 55517680 ####[facility] (DEFAULT)[address] [place] [address]   
   
                                                    Hematocrit (Bld)   
[Volume fraction] 32.7 %          Low             34.8-51.9       [facility]  
   
                                        Comment on above:   Performed By: #### 7  
920040, 7458640, 1719474, 7400952136,   
3975460, 11827021 ####[facility] (DEFAULT)[address] [place] [address]   
   
                                                    Hemoglobin (Bld)   
[Mass/Vol]      11.0 g/dL       Low             11.8-17.7       [facility]  
   
                                        Comment on above:   Performed By: #### 7  
063716, 9391767, 4961893, 8329420962,   
0774657, 26631219 ####[facility] (DEFAULT)[address] [place] [address]   
   
                                Man Diff?       Auto            Invalid   
Interpretation   
Code                                                [facility]  
   
                                        Comment on above:   Performed By: #### 7  
991642, 0308328, 0971666, 7853694499,   
3491381, 02674974 ####[facility] (DEFAULT)[address] [place] [address]   
   
                                                    MCH (RBC) [Entitic   
mass]           30 pg           Normal          24-34           [facility]  
   
                                        Comment on above:   Performed By: #### 7  
890648, 4693877, 9119838, 6047318879,   
5336235, 19135519 ####[facility] (DEFAULT)[address] [place] [address]   
   
                                                    MCHC (RBC)   
[Mass/Vol]      34 g/dL         Normal          26-37           [facility]  
   
                                        Comment on above:   Performed By: #### 7  
885235, 5568625, 6140436, 7460855278,   
2029383, 66057288 ####[facility] (DEFAULT)[address] [place] [address]   
   
                                                    MCV (RBC) [Entitic   
vol]            88 fL           Normal                    [facility]  
   
                                        Comment on above:   Performed By: #### 7  
866038, 4954470, 9160140, 3974833987,   
0045340, 81972872 ####[facility] (DEFAULT)[address] [place] [address]   
   
                      Platelet   112 x10    Low        138-427    [facility]  
   
                                        Comment on above:   Performed By: #### 7  
752412, 2100252, 7894898, 9369854600,   
4990566, 60071944 ####[facility] (DEFAULT)[address] [place] [address]   
   
                                                    Platelet mean volume   
(Bld) [Entitic vol] 8.8 fL          Normal          6.3-10.2        [facility]  
   
                                        Comment on above:   Performed By: #### 7  
960197, 7302793, 7355823, 0908200074,   
0977903, 50512102 ####[facility] (DEFAULT)[address] [place] [address]   
   
                      RBC        3.71 x10   Normal     3.70-5.30  [facility]  
   
                                        Comment on above:   Performed By: #### 7  
074660, 1194685, 5828167, 9340281008,   
1346109, 93909491 ####[facility] (DEFAULT)[address] [place] [address]   
   
                      WBC        3.4 x10    Low        3.5-10.5   [facility]  
   
                                        Comment on above:   Performed By: #### 7  
562566, 5135769, 3158539, 5440528999,   
0362565, 45254601 ####[facility] (DEFAULT)[address] [place] [address]   
   
                                                    CKon 2024   
   
                                                    CK [Catalytic   
activity/Vol]   1412 U/L        High                      [facility]  
   
                                        Comment on above:   Performed By: #### 7  
452021, 6330377, 6068954, 4290390506,   
3398650, 25633012 ####[facility] (DEFAULT)[address] [place] [address]   
   
                                                    CMP Standardon 2024   
   
                                eGFR Non AA     55 mL/min/1.73m2 Invalid   
Interpretation   
Code                                                [facility]  
   
                                        Comment on above:   Performed By: #### 7  
886931, 8223578, 2871783, 8580466614,   
3669756, 67244992 ####[facility] (DEFAULT)[address] [place] [address]   
   
                                eGFR AA         >60             Invalid   
Interpretation   
Code                                                [facility]  
   
                                        Comment on above:   Performed By: #### 7  
794607, 9285088, 5088837, 5619597302,   
2534515, 35438852 ####[facility] (DEFAULT)[address] [place] [address]   
   
                      Albumin [Mass/Vol] 2.6 g/dL   Low        3.5-5.0    [facility]  
   
                                        Comment on above:   Performed By: #### 7  
236731, 8887265, 4496054, 2947824403,   
9208705, 91323523 ####[facility] (DEFAULT)[address] [place] [address]   
   
                      Alk Phos   46 IU/L    Normal     32-91      [facility]  
   
                                        Comment on above:   Performed By: #### 7  
788915, 2236389, 5987208, 4156604263,   
2042456, 01462028 ####[facility] (DEFAULT)[address] [place] [address]   
   
                                                    ALT [Catalytic   
activity/Vol]   7.0 U/L         Low             17.0-63.0       [facility]  
   
                                        Comment on above:   Performed By: #### 7  
476183, 1690080, 2854429, 2835571911,   
7332093, 10363556 ####[facility] (DEFAULT)[address] [place] [address]   
   
                                                    AST [Catalytic   
activity/Vol]   109 U/L         High            15-41           [facility]  
   
                                        Comment on above:   Performed By: #### 7  
990293, 6209840, 4814791, 4766371728,   
7802417, 73136888 ####[facility] (DEFAULT)[address] [place] [address]   
   
                      Bili Total 0.6 mg/dL  Normal     0.3-1.2    [facility]  
   
                                        Comment on above:   Performed By: #### 7  
834629, 1121866, 5377221, 3557838700,   
2623969, 53471045 ####[facility] (DEFAULT)[address] [place] [address] [place] [address]   
   
                      Calcium [Mass/Vol] 7.7 mg/dL  Low        8.9-10.3   [facility]  
   
                                        Comment on above:   Performed By: #### 7  
195256, 9195091, 7992623, 9143247698,   
7927401, 42602211 ####[facility] (DEFAULT)[address] [place] [address]   
   
                      Chloride [Moles/Vol] 111 mmol/L Normal     101-111    [facility]  
   
                                        Comment on above:   Performed By: #### 7  
065584, 2592687, 8228096, 4182036331,   
6197841, 38939460 ####[facility] (DEFAULT)[address] [place] [address]   
   
                      CO2 [Moles/Vol] 24 mmol/L  Normal     21-32      [facility]  
   
                                        Comment on above:   Performed By: #### 7  
309818, 5853721, 9862479, 8726818674,   
3304869, 75885056 ####[facility] (DEFAULT)[address] [place] [address]   
   
                                                    Creatinine   
[Mass/Vol]      1.24 mg/dL      Normal          0.90-1.30       [facility]  
   
                                        Comment on above:   Performed By: #### 7  
524105, 3656964, 6649889, 0609288967,   
6613648, 46186743 ####[facility] (DEFAULT)[address] [place] [address]   
   
                      Glucose [Mass/Vol] 103.0 mg/dL Normal     74.0-118.0 [facility]  
   
                                        Comment on above:   Performed By: #### 7  
336885, 7219216, 6980055, 7489181787,   
2633474, 81811309 ####[facility] (DEFAULT)[address] [place] [address]   
   
                                                    Potassium   
[Moles/Vol]     3.7 mmol/L      Normal          3.6-5.1         [facility]  
   
                                        Comment on above:   Performed By: #### 7  
198961, 8461577, 4726930, 4102054187,   
2554673, 58226149 ####[facility] (DEFAULT)[address] [place] [address]   
   
                      Protein [Mass/Vol] 5.0 g/dL   Low        6.5-8.1    [facility]  
   
                                        Comment on above:   Performed By: #### 7  
124310, 1584755, 6656984, 8088728157,   
2389947, 39516017 ####[facility] (DEFAULT)[address] [place] [address]   
   
                      Sodium [Moles/Vol] 140.0 mmol/L Normal     136.0-144.0 [facility]  
   
                                        Comment on above:   Performed By: #### 7  
218492, 4143621, 3157393, 7999398817,   
6417867, 00228752 ####[facility] (DEFAULT)[address] [place] [address]   
   
                                                    Urea nitrogen   
[Mass/Vol]      28 mg/dL        High            8-            [facility]  
   
                                        Comment on above:   Performed By: #### 7  
926644, 3337449, 9910906, 9163292934,   
2234744, 33173032 ####[facility] (DEFAULT)[address] [place] [address]   
   
                                                    Albumin/Globulin   
[Mass ratio]    1.0 {ratio}     Low             1.4-2.6         [facility]  
   
                                        Comment on above:   Performed By: #### 7  
709091, 5281763, 7241668, 9510380230,   
1800768, 86390337 ####[facility] (DEFAULT)[address] [place] [address]   
   
                                                    Anion gap   
[Moles/Vol]     8.7 mmol/L      Normal          5.0-19.0        [facility]  
   
                                        Comment on above:   Performed By: #### 7  
592218, 2227833, 4953842, 3492849063,   
7963019, 73883254 ####[facility] (DEFAULT)[address] [place] [address]   
   
                                                    Globulin (S)   
[Mass/Vol]      2.4 g/dL        Normal          1.5-4.3         [facility]  
   
                                        Comment on above:   Performed By: #### 7  
812591, 1875746, 5311623, 8685026455,   
6255290, 27969133 ####[facility] (DEFAULT)[address] [place] [address]   
   
                                Osmolality      285 mOsm/L      Invalid   
Interpretation   
Code                                                [facility]  
   
                                        Comment on above:   Performed By: #### 7  
793497, 6891779, 5581926, [id number],   
3597198, 37510051 ####[facility] (DEFAULT)[address] [place] [address]   
   
                                                    Urea   
nitrogen/Creatinine   
[Mass ratio]    22.5 mg/mg      High            4.6-16.2        [facility]  
   
                                        Comment on above:   Performed By: #### 7  
392964, 8653062, 0409440, [id number],   
9087682, 62721721 ####[facility] (DEFAULT)[address] [place] [address]   
   
                                                    CRPon 2024   
   
                      CRP        6.7 mg/dL  High       <=0.5      [facility]  
   
                                        Comment on above:   Performed By: #### 7  
593831, 7372370, 7740940, 1958132053,   
0889002, 24123245 ####[facility] (DEFAULT)[address] [place] [address]   
   
                                                    Myoglobinon 2024   
   
                      Myoglobin [Mass/Vol] 632.8 ng/mL High       17.4-105.7 [facility]  
   
                                        Comment on above:   Performed By: #### 7  
104205, 0623781, 6439011, [id number],   
3538660, 12708639 ####[facility] (DEFAULT)[address] [place] [address]   
   
                                                    Nutrition Noteon 2024   
   
                      Nutrition Note            Normal                [facility]  
   
                                                    POCT Glucose Levelon   
024   
   
                      Glucose [Mass/Vol] 97 mg/dL   Normal          [facility]  
   
                                        Comment on above:   Performed By: #### 4  
[id number] ####[facility]   
(DEFAULT)[address] [place] [address]   
   
                                                    Progress Note - Nurseon    
   
                                                    Progress Note -   
Nurse                           [facility]  
   
                                                    Telemetry Stripson    
   
                                        Telemetry Strips    [id number]  
60  
[id number]#1.00O  
TGTIFF              Normal                                  [facility]  
   
                                                    .Auto Diff 1on 2024   
   
                      Auto Mono % 13 %       High       1-12       [facility]  
   
                                        Comment on above:   Performed By: #### 2  
126050, 4984361, 421290086, 86675316,   
4124399, 0463301, [id number] ####[facility] (DEFAULT)[address] [place] [address]   
   
                      Baso Abs#  0.0 x10    Normal     0.0-0.2    [facility]  
   
                                        Comment on above:   Performed By: #### 2  
009937, 2457414, 149202869, 89657377,   
5882966, 1796717, [id number] ####[facility] (DEFAULT)[address] [place] [address]   
   
                                                    Basophils/100 WBC   
(Bld)           0.2 %           Normal          0.2-2.0         [facility]  
   
                                        Comment on above:   Performed By: #### 2  
056962, 7572169, 796246574, 66233188,   
9122402, 2842130, 6588923274 ####[facility] (DEFAULT)[address] [place] [address]   
   
                      Eos Abs#   0.0 x10    Normal     0.0-0.4    [facility]  
   
                                        Comment on above:   Performed By: #### 2  
576554, 9556148, 612849296, 52306204,   
2338217, 2438952, 1340234439 ####[facility] (DEFAULT)[address] [place] [address]   
   
                                                    Eosinophils/100 WBC   
(Bld)           0.0 %           Low             0.9-4.0         [facility]  
   
                                        Comment on above:   Performed By: #### 2  
154029, 3966119, 506450609, 87814629,   
6062531, 7289055, [id number] ####[facility] (DEFAULT)[address] [place] [address]   
   
                      Lymph Abs# 0.5 x10    Low        1.3-2.9    [facility]  
   
                                        Comment on above:   Performed By: #### 2  
476953, 6366948, 267381612, 51330274,   
0024928, 3557546, [id number] ####[facility] (DEFAULT)[address] [place] [address]   
   
                                                    Lymphocytes/100 WBC   
(Bld)           10 %            Low             14-48           [facility]  
   
                                        Comment on above:   Performed By: #### 2  
688625, 2877114, 716341631, 27193038,   
3646885, 2505717, [id number] ####[facility] (DEFAULT)[address] [place] [address]   
   
                      Mono Abs#  0.6 x10    Normal     0.0-0.8    [facility]  
   
                                        Comment on above:   Performed By: #### 2  
126252, 6142864, 810985500, 13955595,   
1880421, 6423692, 5819141358 ####[facility] (DEFAULT)[address] [place] [address]   
   
                      Neut Abs#  3.6 x10    Normal     1.5-9.2    [facility]  
   
                                        Comment on above:   Performed By: #### 2  
148557, 2453876, 817951826, 48986170,   
6272083, 9355109, 9331434879 ####[facility] (DEFAULT)[address] [place] [address]   
   
                                                    Neutrophils/100 WBC   
(Bld)           77 %            Normal          44-88           [facility]  
   
                                        Comment on above:   Performed By: #### 2  
250776, 5837870, 143303919, 29863546,   
2010912, 5091722, 1029865777 ####[facility] (DEFAULT)[address] [place] [address]   
   
                                                    C Urineon 2024   
   
                      C Urine               Normal                [facility]  
   
                                        Comment on above:   Performed By: #### 1  
911491234, 6433350, 04198145 ####[facility] (DEFAULT)[address] [place] [address]   
   
                                                    CBC w/ Auto Diffon    
   
                                                    Erythrocyte   
distribution width   
(RBC) [Ratio]   15.4 %          High            11.5-15.0       [facility]  
   
                                        Comment on above:   Performed By: #### 2  
508157, 7972566, 824890292, 66772456,   
0620748, 7965921, 2845843872 ####[facility] (DEFAULT)[address] [place] [address]   
   
                                                    Hematocrit (Bld)   
[Volume fraction] 33.2 %          Low             34.8-51.9       [facility]  
   
                                        Comment on above:   Performed By: #### 2  
919573, 4006569, 490839388, 15074061,   
6970116, 2091796, [id number] ####[facility] (DEFAULT)[address] [place] [address]   
   
                                                    Hemoglobin (Bld)   
[Mass/Vol]      11.3 g/dL       Low             11.8-17.7       [facility]  
   
                                        Comment on above:   Performed By: #### 2  
185278, 1656544, 107387293, 21586560,   
2854258, 4810459, 6039374999 ####[facility] (DEFAULT)[address] [place] [address]   
   
                                Man Diff?       Auto            Invalid   
Interpretation   
Code                                                [facility]  
   
                                        Comment on above:   Performed By: #### 2  
717786, 3464109, 113778282, 51791316,   
5206070, 7477371, [id number] ####[facility] (DEFAULT)[address] [place] [address]   
   
                                                    MCH (RBC) [Entitic   
mass]           30 pg           Normal          24-34           [facility]  
   
                                        Comment on above:   Performed By: #### 2  
375073, 8899264, 886977225, 83401730,   
9583967, 5202610, 3622165829 ####[facility] (DEFAULT)[address] [place] [address]   
   
                                                    MCHC (RBC)   
[Mass/Vol]      34 g/dL         Normal          26-37           [facility]  
   
                                        Comment on above:   Performed By: #### 2  
779926, 4211449, 626189661, 60804768,   
5774673, 2787707, 9004603282 ####[facility] (DEFAULT)[address] [place] [address]   
   
                                                    MCV (RBC) [Entitic   
vol]            88 fL           Normal                    [facility]  
   
                                        Comment on above:   Performed By: #### 2  
845187, 4813368, 265475271, 91105578,   
3034362, 5037643, 6837722935 ####[facility] (DEFAULT)[address] [place] [address]   
   
                      Platelet   112 x10    Low        138-427    [facility]  
   
                                        Comment on above:   Performed By: #### 2  
960776, 6034394, 432578246, 51001925,   
2158845, 3994798, [id number] ####[facility] (DEFAULT)[address] [place] [address]   
   
                                                    Platelet mean volume   
(Bld) [Entitic vol] 8.6 fL          Normal          6.3-10.2        [facility]  
   
                                        Comment on above:   Performed By: #### 2  
923806, 4547376, 240214276, 44220589,   
2752624, 3020718, 1007101247 ####[facility] (DEFAULT)[address] [place] [address]   
   
                      RBC        3.77 x10   Normal     3.70-5.30  [facility]  
   
                                        Comment on above:   Performed By: #### 2  
285121, 6028913, 975231663, 91175445,   
4042322, 2752722, 6310010034 ####[facility] (DEFAULT)[address] [place] [address]   
   
                      WBC        4.7 x10    Normal     3.5-10.5   [facility]  
   
                                        Comment on above:   Performed By: #### 2  
391752, 5486996, 512078100, 38590006,   
9523604, 2376833, 9294637861 ####[facility] (DEFAULT)[address] [place] [address]   
   
                                                    CKon 2024   
   
                                                    CK [Catalytic   
activity/Vol]   2005 U/L        High                      [facility]  
   
                                        Comment on above:   Performed By: #### 2  
051218, 7373916, 008737701, 46583439,   
8516863, 1690536, 7731369502 ####[facility] (DEFAULT)[address] [place] [address]   
   
                                                    CMP Standardon 2024   
   
                                                    Anion gap   
[Moles/Vol]     8.6 mmol/L      Normal          5.0-19.0        [facility]  
   
                                        Comment on above:   Performed By: #### 2  
767342, 1398324, 014560409, 67934025,   
5912513, 8781166, [id number] ####[facility] (DEFAULT)[address] [place] [address]   
   
                      Calcium [Mass/Vol] 7.6 mg/dL  Low        8.9-10.3   [facility]  
   
                                        Comment on above:   Performed By: #### 2  
881802, 7073024, 826963032, 86498575,   
0580830, 7377927, [id number] ####[facility] (DEFAULT)[address] [place] [address]   
   
                      Chloride [Moles/Vol] 111 mmol/L Normal     101-111    [facility]  
   
                                        Comment on above:   Performed By: #### 2  
607677, 5416357, 534672915, 33013263,   
6062891, 1854157, 6466951997 ####[facility] (DEFAULT)[address] [place] [address]   
   
                      CO2 [Moles/Vol] 23 mmol/L  Normal     21-32      [facility]  
   
                                        Comment on above:   Performed By: #### 2  
074646, 6526228, 766228574, 10432656,   
3616301, 4753183, [id number] ####[facility] (DEFAULT)[address] [place] [address]   
   
                      Glucose [Mass/Vol] 106.0 mg/dL Normal     74.0-118.0 [facility]  
   
                                        Comment on above:   Performed By: #### 2  
303914, 4065751, 052915634, 48586902,   
8728330, 3166462, 9344468719 ####[facility] (DEFAULT)[address] [place] [address]   
   
                                Osmolality      284 mOsm/L      Invalid   
Interpretation   
Code                                                [facility]  
   
                                        Comment on above:   Performed By: #### 2  
550856, 8711678, 911166783, 25922005,   
4040826, 8334216, 8252013877 ####[facility] (DEFAULT)[address] [place] [address]   
   
                                                    Potassium   
[Moles/Vol]     3.6 mmol/L      Normal          3.6-5.1         [facility]  
   
                                        Comment on above:   Performed By: #### 2  
240595, 9947873, 839179917, 11302140,   
8863492, 9241161, 1227308576 ####[facility] (DEFAULT)[address] [place] [address]   
   
                      Sodium [Moles/Vol] 139.0 mmol/L Normal     136.0-144.0 [facility]  
   
                                        Comment on above:   Performed By: #### 2  
518599, 5750546, 928922586, 19011517,   
1078350, 1698580, [id number] ####[facility] (DEFAULT)[address] [place] [address]   
   
                                eGFR Non AA     44 mL/min/1.73m2 Invalid   
Interpretation   
Code                                                [facility]  
   
                                        Comment on above:   Performed By: #### 2  
750712, 5217849, 438994980, 87829333,   
7102876, 5454482, 5112839534 ####[facility] (DEFAULT)[address] [place] [address]   
   
                                eGFR AA         54 mL/min/1.73m2 Invalid   
Interpretation   
Code                                                [facility]  
   
                                        Comment on above:   Performed By: #### 2  
060025, 7217788, 398317314, 93306710,   
4041184, 0761284, [id number] ####[facility] (DEFAULT)[address] [place] [address]   
   
                      Albumin [Mass/Vol] 2.7 g/dL   Low        3.5-5.0    [facility]  
   
                                        Comment on above:   Performed By: #### 2  
135069, 7156449, 600567004, 72838061,   
6892741, 3389819, 5324920457 ####[facility] (DEFAULT)[address] [place] [address]   
   
                                                    Albumin/Globulin   
[Mass ratio]    1.2 {ratio}     Low             1.4-2.6         [facility]  
   
                                        Comment on above:   Performed By: #### 2  
072900, 2355817, 341375113, 15421774,   
9722590, 5665402, [id number] ####[facility] (DEFAULT)[address] [place] [address]   
   
                      Alk Phos   48 IU/L    Normal     32-91      [facility]  
   
                                        Comment on above:   Performed By: #### 2  
856500, 2454160, 002188355, 55834276,   
8373654, 7453400, 4785979509 ####[facility] (DEFAULT)[address] [place] [address]   
   
                                                    ALT [Catalytic   
activity/Vol]   7.0 U/L         Low             17.0-63.0       [facility]  
   
                                        Comment on above:   Performed By: #### 2  
299455, 4591120, 361061991, 12317335,   
0922592, 5479028, 4660552622 ####[facility] (DEFAULT)[address] [place] [address]   
   
                                                    AST [Catalytic   
activity/Vol]   128 U/L         High            15-41           [facility]  
   
                                        Comment on above:   Performed By: #### 2  
183565, 4761050, 950568228, 07375003,   
7616312, 2066419, 2687448680 ####[facility] (DEFAULT)[address] [place] [address]   
   
                      Bili Total 0.6 mg/dL  Normal     0.3-1.2    [facility]  
   
                                        Comment on above:   Performed By: #### 2  
107932, 8286060, 812727933, 51247115,   
5637767, 9373245, 2289885474 ####[facility] (DEFAULT)[address] [place] [address]   
   
                                                    Creatinine   
[Mass/Vol]      1.50 mg/dL      High            0.90-1.30       [facility]  
   
                                        Comment on above:   Performed By: #### 2  
834497, 5359854, 210213009, 76079257,   
1407543, 4619628, 0940992775 ####[facility] (DEFAULT)[address] [place] [address]   
   
                                                    Globulin (S)   
[Mass/Vol]      2.2 g/dL        Normal          1.5-4.3         [facility]  
   
                                        Comment on above:   Performed By: #### 2  
179886, 0919897, 166790037, 74034758,   
6575965, 0623833, 4207534414 ####[facility] (DEFAULT)[address] [place] [address]   
   
                      Protein [Mass/Vol] 4.9 g/dL   Low        6.5-8.1    [facility]  
   
                                        Comment on above:   Performed By: #### 2  
556945, 8028986, 622797475, 67761429,   
5022760, 5332076, 2335253619 ####[facility] (DEFAULT)[address] [place] [address]   
   
                                                    Urea nitrogen   
[Mass/Vol]      30 mg/dL        High            8-26            [facility]  
   
                                        Comment on above:   Performed By: #### 2  
219249, 8013625, 528904944, 74946280,   
8056854, 5410657, [id number] ####[facility] (DEFAULT)[address] [place] [address]   
   
                                                    Urea   
nitrogen/Creatinine   
[Mass ratio]    20.0 mg/mg      High            4.6-16.2        [facility]  
   
                                        Comment on above:   Performed By: #### 2  
382300, 6499213, 563476887, 40121611,   
7812932, 9290171, [id number] ####[facility] (DEFAULT)[address] [place] [address]   
   
                                                    CRPon 2024   
   
                      CRP        8.4 mg/dL  High       <=0.5      [facility]  
   
                                        Comment on above:   Performed By: #### 2  
872707, 3106694, 120956797, 02787140,   
3833407, 9995337, [id number] ####[facility] (DEFAULT)[address] [place] [address]   
   
                                                    Myoglobinon 2024   
   
                      Myoglobin [Mass/Vol] 1491.6 ng/mL High       17.4-105.7 [facility]  
   
                                        Comment on above:   Performed By: #### 2  
183601, 1726035, 245942056, 96737654,   
9323936, 8097685, 6154324881 ####[facility] (DEFAULT)[address] [place] [address]   
   
                                                    Procalcitoninon 2024   
   
                      Procalcitonin 1.226 ng/mL High       0.500-1.000 [facility]  
   
                                        Comment on above:   Performed By: #### 2  
406488, 4099623, 218430630, 62598642,   
9398054, 2340001, [id number] ####[facility] (DEFAULT)[address] [place] [address]   
   
                                                    Telemetry Stripson    
   
                                        Telemetry Strips    [id number]  
50  
[id number]#1.00O  
TGTIFF              Normal                                  [facility]  
   
                                                    .Auto Diff 1on 2024   
   
                      Auto Mono % 10 %       Normal     12       [facility]  
   
                                        Comment on above:   Performed By: #### 5  
925461170, 3079347, 0075006, 0632526,   
3690626264, 6249442, 91595919 ####[facility] (DEFAULT)[address] [place] [address]   
   
                      Baso Abs#  0.0 x10    Normal     0.0-0.2    [facility]  
   
                                        Comment on above:   Performed By: #### 5  
203367545, 8891501, 0656825, 5226892,   
8372363675, 7948254, 24401458 ####[facility] (DEFAULT)[address] [place] [address]   
   
                                                    Basophils/100 WBC   
(Bld)           0.5 %           Normal          0.2-2.0         [facility]  
   
                                        Comment on above:   Performed By: #### 5  
643536489, 8269491, 6301955, 2726029,   
2629766832, 4161676, 28553713 ####[facility] (DEFAULT)[address] [place] [address]   
   
                      Eos Abs#   0.0 x10    Normal     0.0-0.4    [facility]  
   
                                        Comment on above:   Performed By: #### 5  
589527030, 3612051, 7811446, 0043729,   
5138048828, 4231147, 76779323 ####[facility] (DEFAULT)[address] [place] [address]   
   
                                                    Eosinophils/100 WBC   
(Bld)           0.0 %           Low             0.9-4.0         [facility]  
   
                                        Comment on above:   Performed By: #### 5  
261261835, 2228146, 0624226, 2317425,   
3103202386, 4109966, 80899895 ####[facility] (DEFAULT)[address] [place] [address]   
   
                      Lymph Abs# 0.5 x10    Low        1.3-2.9    [facility]  
   
                                        Comment on above:   Performed By: #### 5  
115603077, 2340190, 2666254, 8095837,   
8785794319, 1980621, 51396053 ####[facility] (DEFAULT)[address] [place] [address]   
   
                                                    Lymphocytes/100 WBC   
(Bld)           7 %             Low             14-48           [facility]  
   
                                        Comment on above:   Performed By: #### 5  
325999166, 6991048, 6922728, 0259195,   
4146891410, 2467008, 50543098 ####[facility] (DEFAULT)[address] [place] [address]   
   
                      Mono Abs#  0.8 x10    Normal     0.0-0.8    [facility]  
   
                                        Comment on above:   Performed By: #### 5  
392093615, 9043999, 9879064, 9033055,   
6067290550, 1261404, 72254660 ####[facility] (DEFAULT)[address] [place] [address]   
   
                      Neut Abs#  6.8 x10    Normal     1.5-9.2    [facility]  
   
                                        Comment on above:   Performed By: #### 5  
[id number], 5495870, 5912699, 8358865,   
5419900776, 6230198, 42363521 ####[facility] (DEFAULT)[address] [place] [address]   
   
                                                    Neutrophils/100 WBC   
(Bld)           83 %            Normal          44-88           [facility]  
   
                                        Comment on above:   Performed By: #### 5  
[id number], 3603978, 9209448, 6788438,   
5960644900, 3626056, 25279875 ####[facility] (DEFAULT)[address] [place] [address]   
   
                                                    CBC w/ Auto Diffon   
4   
   
                                                    Erythrocyte   
distribution width   
(RBC) [Ratio]   15.6 %          High            11.5-15.0       [facility]  
   
                                        Comment on above:   Performed By: #### 5  
[id number], 9022419, 3387235, 2553043,   
9015555572, 3980260, 00884249 ####[facility] (DEFAULT)[address] [place] [address]   
   
                                                    Hematocrit (Bld)   
[Volume fraction] 38.2 %          Normal          34.8-51.9       [facility]  
   
                                        Comment on above:   Performed By: #### 5  
[id number], 9897116, 0780144, 7542889,   
9225064696, 9400551, 05735522 ####[facility] (DEFAULT)[address] [place] [address]   
   
                                                    Hemoglobin (Bld)   
[Mass/Vol]      12.8 g/dL       Normal          11.8-17.7       [facility]  
   
                                        Comment on above:   Performed By: #### 5  
[id number], 5927348, 3088376, 3731827,   
6609831675, 1548640, 22511450 ####[facility] (DEFAULT)[address] [place] [address]   
   
                                Man Diff?       Auto            Invalid   
Interpretation   
Code                                                [facility]  
   
                                        Comment on above:   Performed By: #### 5  
[id number], 9248935, 9550297, 3515432,   
3126658844, 8142458, 44231835 ####[facility] (DEFAULT)[address] [place] [address]   
   
                                                    MCH (RBC) [Entitic   
mass]           30 pg           Normal          24-34           [facility]  
   
                                        Comment on above:   Performed By: #### 5  
[id number], 5473380, 4963126, 3396842,   
7300716768, 5119271, 30303317 ####[facility] (DEFAULT)[address] [place] [address]   
   
                                                    MCHC (RBC)   
[Mass/Vol]      34 g/dL         Normal          26-37           [facility]  
   
                                        Comment on above:   Performed By: #### 5  
[id number], 4177514, 0666076, 9758157,   
6238282317, 6133404, 28613279 ####[facility] (DEFAULT)[address] [place] [address]   
   
                                                    MCV (RBC) [Entitic   
vol]            88 fL           Normal                    [facility]  
   
                                        Comment on above:   Performed By: #### 5  
[id number], 7669179, 1848886, 8824895,   
5160466462, 4808864, 24278128 ####[facility] (DEFAULT)[address] [place] [address]   
   
                      Platelet   132 x10    Low        138-427    [facility]  
   
                                        Comment on above:   Performed By: #### 5  
218923156, 3343467, 6665150, 4882938,   
2450265311, 2655265, 66885628 ####[facility] (DEFAULT)[address] [place] [address]   
   
                                                    Platelet mean volume   
(Bld) [Entitic vol] 8.7 fL          Normal          6.3-10.2        [facility]  
   
                                        Comment on above:   Performed By: #### 5  
[id number], 5056373, 8018505, 0360275,   
3660789855, 3126011, 98477103 ####[facility] (DEFAULT)[address] [place] [address]   
   
                      RBC        4.32 x10   Normal     3.70-5.30  [facility]  
   
                                        Comment on above:   Performed By: #### 5  
299218199, 5353521, 4474670, 0755324,   
4223260043, 0164904, 96784765 ####[facility] (DEFAULT)[address] [place] [address]   
   
                      WBC        8.2 x10    Normal     3.5-10.5   [facility]  
   
                                        Comment on above:   Performed By: #### 5  
400456700, 9950779, 6408344, 8938477,   
1841231481, 9750902, 67470130 ####[facility] (DEFAULT)[address] [place] [address]   
   
                                                    CKon 2024   
   
                                                    CK [Catalytic   
activity/Vol]   2963 U/L        High                      [facility]  
   
                                        Comment on above:   Performed By: #### 5  
625937604, 2001362, 9729092, 4467292,   
8035084238, 7133716, 34316835 ####[facility] (DEFAULT)[address] [place] [address]   
   
                                                    CMP Standardon 2024   
   
                                eGFR Non AA     44 mL/min/1.73m2 Invalid   
Interpretation   
Code                                                [facility]  
   
                                        Comment on above:   Performed By: #### 5  
[id number], 8734209, 5297961, 9978344,   
9942218817, 5249181, 37211316 ####[facility] (DEFAULT)[address] [place] [address]   
   
                                eGFR AA         54 mL/min/1.73m2 Invalid   
Interpretation   
Code                                                [facility]  
   
                                        Comment on above:   Performed By: #### 5  
187808446, 7019304, 5446876, 9225228,   
6987743235, 6162931, 27560393 ####[facility] (DEFAULT)[address] [place] [address]   
   
                      Albumin [Mass/Vol] 3.0 g/dL   Low        3.5-5.0    [facility]  
   
                                        Comment on above:   Performed By: #### 5  
699987999, 5597722, 9078374, 8295398,   
0680584035, 4672900, 72078521 ####[facility] (DEFAULT)[address] [place] [address]   
   
                                                    Albumin/Globulin   
[Mass ratio]    1.1 {ratio}     Low             1.4-2.6         [facility]  
   
                                        Comment on above:   Performed By: #### 5  
224662922, 5034464, 4415706, 7460507,   
1129536549, 7508729, 99658558 ####[facility] (DEFAULT)[address] [place] [address]   
   
                      Alk Phos   60 IU/L    Normal     32-91      [facility]  
   
                                        Comment on above:   Performed By: #### 5  
576471917, 8618460, 0629239, 0492893,   
2543253755, 6428396, 82731512 ####[facility] (DEFAULT)[address] [place] [address]   
   
                                                    ALT [Catalytic   
activity/Vol]   50.0 U/L        Normal          17.0-63.0       [facility]  
   
                                        Comment on above:   Performed By: #### 5  
103553001, 2062893, 6447360, 0502285,   
2792364542, 9131518, 40570134 ####[facility] (DEFAULT)[address] [place] [address]   
   
                                                    Anion gap   
[Moles/Vol]     7.6 mmol/L      Normal          5.0-19.0        [facility]  
   
                                        Comment on above:   Performed By: #### 5  
776524363, 8857808, 3191769, 4720779,   
4316362331, 7892420, 98870104 ####[facility] (DEFAULT)[address] [place] [address]   
   
                                                    AST [Catalytic   
activity/Vol]   158 U/L         High            15-41           [facility]  
   
                                        Comment on above:   Performed By: #### 5  
198963811, 4201324, 1302044, 0572543,   
2047634553, 1926177, 47462360 ####[facility] (DEFAULT)[address] [place] [address]   
   
                      Bili Total 0.9 mg/dL  Normal     0.3-1.2    [facility]  
   
                                        Comment on above:   Performed By: #### 5  
747107282, 7951007, 2990241, 9741861,   
2003503015, 8561376, 94553882 ####[facility] (DEFAULT)[address] [place] [address]   
   
                      Calcium [Mass/Vol] 7.8 mg/dL  Low        8.9-10.3   [facility]  
   
                                        Comment on above:   Performed By: #### 5  
571698495, 1700718, 2709080, 8699534,   
3152619779, 0751758, 68696767 ####[facility] (DEFAULT)[address] [place] [address]   
   
                      Chloride [Moles/Vol] 111 mmol/L Normal     101-111    [facility]  
   
                                        Comment on above:   Performed By: #### 5  
759676576, 7850962, 9811671, 0900963,   
2657440982, 8093124, 29959205 ####[facility] (DEFAULT)[address] [place] [address]   
   
                      CO2 [Moles/Vol] 22 mmol/L  Normal     21-32      [facility]  
   
                                        Comment on above:   Performed By: #### 5  
075855040, 6535021, 3921221, 7987247,   
1122579915, 1611287, 20884479 ####[facility] (DEFAULT)[address] [place] [address]   
   
                                                    Creatinine   
[Mass/Vol]      1.50 mg/dL      High            0.90-1.30       [facility]  
   
                                        Comment on above:   Performed By: #### 5  
901504389, 1073605, 4580823, 1405674,   
0492736937, 3451139, 50787729 ####[facility] (DEFAULT)[address] [place] [address]   
   
                                                    Globulin (S)   
[Mass/Vol]      2.6 g/dL        Normal          1.5-4.3         [facility]  
   
                                        Comment on above:   Performed By: #### 5  
480152552, 7306028, 9378374, 0226884,   
9852195936, 1628453, 72758081 ####[facility] (DEFAULT)[address] [place] [address]   
   
                      Glucose [Mass/Vol] 128.0 mg/dL High       74.0-118.0 [facility]  
   
                                        Comment on above:   Performed By: #### 5  
945842618, 8551504, 0443027, 7838402,   
6195250728, 7941830, 94627296 ####[facility] (DEFAULT)[address] [place] [address]   
   
                                Osmolality      283 mOsm/L      Invalid   
Interpretation   
Code                                                [facility]  
   
                                        Comment on above:   Performed By: #### 5  
661219113, 1525647, 5605114, 2564738,   
5016176582, 7161427, 08728611 ####[facility] (DEFAULT)[address] [place] [address]   
   
                                                    Potassium   
[Moles/Vol]     3.6 mmol/L      Normal          3.6-5.1         [facility]  
   
                                        Comment on above:   Performed By: #### 5  
028808979, 7835786, 6116184, 5404745,   
4317833944, 8433618, 85529889 ####[facility] (DEFAULT)[address] [place] [address]   
   
                      Protein [Mass/Vol] 5.6 g/dL   Low        6.5-8.1    [facility]  
   
                                        Comment on above:   Performed By: #### 5  
338398792, 5763969, 3424284, 6382906,   
8569941459, 6535885, 02455718 ####[facility] (DEFAULT)[address] [place] [address]   
   
                      Sodium [Moles/Vol] 137.0 mmol/L Normal     136.0-144.0 [facility]  
   
                                        Comment on above:   Performed By: #### 5  
424507484, 5466188, 2205645, 7897063,   
0445799618, 8549369, 79797434 ####[facility] (DEFAULT)[address] [place] [address]   
   
                                                    Urea nitrogen   
[Mass/Vol]      33 mg/dL        High            8-26            [facility]  
   
                                        Comment on above:   Performed By: #### 5  
152515455, 4409279, 1289287, 0167562,   
8281723996, 4874575, 11369794 ####[facility] (DEFAULT)[address] [place] [address]   
   
                                                    Urea   
nitrogen/Creatinine   
[Mass ratio]    22.0 mg/mg      High            4.6-16.2        [facility]  
   
                                        Comment on above:   Performed By: #### 5  
460235461, 8784607, 4755231, 2577231,   
8656112181, 8467148, 29680188 ####[facility] (DEFAULT)[address] [place] [address]   
   
                                                    Magnesiumon 2024   
   
                      Magnesium [Mass/Vol] 1.80 mg/dL Normal     1.80-2.50  [facility]  
   
                                        Comment on above:   Performed By: #### 5  
012448520, 2095550, 4173705, 8603740,   
5405027736, 0546142, 66520991 ####[facility] (DEFAULT)[address] [place] [address]   
   
                                                    Myoglobinon 2024   
   
                      Myoglobin [Mass/Vol] 3674.6 ng/mL High       17.4-105.7 [facility]  
   
                                        Comment on above:   Performed By: #### 5  
253792756, 7777639, 4373095, 0376536,   
3105960720, 8935415, 51796017 ####[facility] (DEFAULT)[address]   
[place] [address]   
   
                                                    Nutrition Noteon 2024   
   
                      Nutrition Note            Normal                [facility]  
   
                                                    Pharmacy Noteon 2024   
   
                      Pharmacy Note            Normal                [facility]  
   
                                                    Telemetry Stripson    
   
                                        Telemetry Strips    [id number]  
40  
[id number]#[id number]  
TGTIFF              Normal                                  [facility]  
   
                                                    TnI HSon 2024   
   
                                                    Troponin I High   
Sensitivity               466.0 pg/mL               Critically   
abnormal                  <=20.0                    [facility]  
   
                                        Comment on above:   Result Comment: Crit  
ical result TNIHS 466.0 pg/mL called to   
and   
read back by caprice colmenares at 06-Mar-2024 05:14 by roman.   
   
                                                            Performed By: #### 5  
[id number], 8583719, 4637923, 5389575,   
2114674510, 6899835, 96318467 ####[facility] (DEFAULT)[address] [place] [address]   
   
                                                    .Auto Diff 12024   
   
                      Auto Mono % 9 %        Normal     -12       [facility]  
   
                                        Comment on above:   Performed By: #### 2  
612965, 48879397, 1569949446, 4929130,   
2690711, 5900023, 538467833, 4157616448, 2243027, 0027921855,   
[id number] ####[facility] (DEFAULT)[address] [place] [address]   
   
                      Baso Abs#  0.0 x10    Normal     0.0-0.2    [facility]  
   
                                        Comment on above:   Performed By: #### 2  
319537, 26708751, 4775004822, 8753573,   
7357073, 9548732, 525888004, 1830464960, 9584490, 5325682567,   
[id number] ####[facility] (DEFAULT)[address] [place] [address]   
   
                                                    Basophils/100 WBC   
(Bld)           0.1 %           Low             0.2-2.0         [facility]  
   
                                        Comment on above:   Performed By: #### 2  
116801, 55196025, 7476953997, 8677936,   
9159088, 1125311, 546610869, 6456201547, 8983577, 8796804104,   
[id number] ####[facility] (DEFAULT)[address] [place] [address]   
   
                      Eos Abs#   0.0 x10    Normal     0.0-0.4    [facility]  
   
                                        Comment on above:   Performed By: #### 2  
804535, 89326561, 3010681838, 5236442,   
4776854, 3214039, 174703432, 7619269553, 1253762, 2602875749,   
[id number] ####[facility] (DEFAULT)[address] [place] [address]   
   
                                                    Eosinophils/100 WBC   
(Bld)           0.0 %           Low             0.9-4.0         [facility]  
   
                                        Comment on above:   Performed By: #### 2  
523126, 12559926, 3149602308, 5332771,   
9669390, 8315146, 469996051, 3955832942, 7385868, 9443812296,   
[id number] ####[facility] (DEFAULT)[address] [place] [address]   
   
                      Lymph Abs# 0.3 x10    Low        1.3-2.9    [facility]  
   
                                        Comment on above:   Performed By: #### 2  
147880, 07767532, 4971865717, 6612336,   
6929347, 0405653, 235575530, 1639995455, 2733684, 5290920554,   
[id number] ####[facility] (DEFAULT)[address] [place] [address]   
   
                                                    Lymphocytes/100 WBC   
(Bld)           3 %             Low             14-48           [facility]  
   
                                        Comment on above:   Performed By: #### 2  
379962, 62933231, 2694628910, 2036465,   
3173980, 1111179, 429343116, 1757309438, 4391045, 0817319146,   
[id number] ####[facility] (DEFAULT)[address] [place] [address]   
   
                      Mono Abs#  1.0 x10    High       0.0-0.8    [facility]  
   
                                        Comment on above:   Performed By: #### 2  
271206, 34453215, 7781384868, 9314364,   
3731793, 1569251, 424800572, 7435308016, 9325313, 2386004973,   
[id number] ####[facility] (DEFAULT)[address] [place] [address]   
   
                      Neut Abs#  9.5 x10    High       1.5-9.2    [facility]  
   
                                        Comment on above:   Performed By: #### 2  
163626, 30854719, 0151780806, 2261213,   
3789690, 4538431, 325388020, 3229974949, 2844121, 1983341511,   
[id number] ####[facility] (DEFAULT)[address] [place] [address]   
   
                                                    Neutrophils/100 WBC   
(Bld)           88 %            Normal          44-88           [facility]  
   
                                        Comment on above:   Performed By: #### 2  
659817, 99014609, 9999790924, 8063748,   
1107292, 7588297, 992431835, 8297498309, 2980700, 3174966156,   
[id number] ####[facility] (DEFAULT)[address] [place] [address]   
   
                                                    .QC SARS-CoV-2 (COVID-19)/Fl  
u/RSV ([employer])on 2024   
   
                      Internal Control Pass       Normal                [facility]  
   
                                        Comment on above:   Order Comment: Order  
ed by [employer].[[email or web address]]   
   
                                                            Performed By: #### 7  
[id number], [id number] ####[facility]   
(DEFAULT)[address] [place] [address]   
   
                                                    CBC w/ Auto Diffon    
   
                                                    Erythrocyte   
distribution width   
(RBC) [Ratio]   15.2 %          High            11.5-15.0       [facility]  
   
                                        Comment on above:   Performed By: #### 2  
812326, 29443754, 5405379163, 9349021,   
0782757, 1098363, 344465531, 5090216308, 6383510, 0557358054,   
[id number] ####[facility] (DEFAULT)[address] [place] [address]   
   
                                                    Hematocrit (Bld)   
[Volume fraction] 44.0 %          Normal          34.8-51.9       [facility]  
   
                                        Comment on above:   Performed By: #### 2  
101037, 64250980, 7986141662, 9987433,   
5188591, 9370676, 919668839, 6922915467, 0950854, 1759720701,   
[id number] ####[facility] (DEFAULT)[address] [place] [address]   
   
                                                    Hemoglobin (Bld)   
[Mass/Vol]      14.7 g/dL       Normal          11.8-17.7       [facility]  
   
                                        Comment on above:   Performed By: #### 2  
404209, 87835453, 6753753483, 7244900,   
0341849, 1554579, 851588677, 0574110383, 9568188, 9599672945,   
[id number] ####[facility] (DEFAULT)[address] [place] [address]   
   
                                Man Diff?       Auto            Invalid   
Interpretation   
Code                                                [facility]  
   
                                        Comment on above:   Performed By: #### 2  
646485, 16292569, 4787455728, 7954877,   
4482724, 6609585, 440717317, 4362739033, 7234898, 1945369515,   
[id number] ####[facility] (DEFAULT)[address] [place] [address]   
   
                                                    MCH (RBC) [Entitic   
mass]           30 pg           Normal          24-34           [facility]  
   
                                        Comment on above:   Performed By: #### 2  
290322, 74943141, 5957707318, 0849901,   
1833214, 0663175, 579608249, 7668542453, 6286437, 7765285852,   
[id number] ####[facility] (DEFAULT)[address] [place], [place] [address]   
   
                                                    MCHC (RBC)   
[Mass/Vol]      33 g/dL         Normal          26-37           [facility]  
   
                                        Comment on above:   Performed By: #### 2  
876755, 84697573, 3871884985, 4273858,   
6856049, 2563706, 029956883, 2968275801, 7016601, 3995306352,   
[id number] ####[facility] (DEFAULT)[address] [place] [address]   
   
                                                    MCV (RBC) [Entitic   
vol]            89 fL           Normal                    [facility]  
   
                                        Comment on above:   Performed By: #### 2  
486000, 33647090, 5209766667, 9494941,   
2534615, 4406151, 808754500, 5826019757, 8476929, 0239240332,   
[id number] ####[facility] (DEFAULT)[address] [place] [address]   
   
                      Platelet   173 x10    Normal     138-427    [facility]  
   
                                        Comment on above:   Performed By: #### 2  
821788, 57173917, 4044153373, 7299695,   
9240303, 9173484, 457732279, 3784639180, 6245899, 9481913053,   
[id number] ####[facility] (DEFAULT)[address] [place] [address]   
   
                                                    Platelet mean volume   
(Bld) [Entitic vol] 7.9 fL          Normal          6.3-10.2        [facility]  
   
                                        Comment on above:   Performed By: #### 2  
338040, 27621890, 4540935276, 6399106,   
5468186, 1716566, 726602012, 2248438186, 9518713, 2095351032,   
[id number] ####[facility] (DEFAULT)[address] [place] [address]   
   
                      RBC        4.95 x10   Normal     3.70-5.30  [facility]  
   
                                        Comment on above:   Performed By: #### 2  
241997, 04032299, 2423431158, 2102578,   
8503874, 7702775, 887040814, 9177813460, 2416775, 5183309195,   
[id number] ####[facility] (DEFAULT)[address] [place] [address]   
   
                      WBC        10.7 x10   High       3.5-10.5   [facility]  
   
                                        Comment on above:   Performed By: #### 2  
161765, 89876501, 6554309485, 7612117,   
1156342, 3570566, 592068813, 7907831608, 5357172, 6981340386,   
[id number] ####[facility] (DEFAULT)[address] [place], [place] [address]n 2024   
   
                      Total CK   >4100      High            [facility]  
   
                                        Comment on above:   Result Comment: Veri  
fied by dilution   
   
                                                            Performed By: #### 2  
322019, 71975353, 7039208550, 9573749,   
3364559, 3186045, 936406873, 2190443724, 8188204, 0785842507,   
[id number] ####[facility] (DEFAULT)[address] [place], [place] [address]   
   
                                                    [facility] Standardon 2024   
   
                                eGFR Non AA     49 mL/min/1.73m2 Invalid   
Interpretation   
Code                                                [facility]  
   
                                        Comment on above:   Performed By: #### 2  
097074, 39535697, 7444152864, 1250258,   
2290080, 7789690, 901708779, 9427516437, 8175761, 2981932075,   
[id number] ####[facility] (DEFAULT)[address] [place] [address]   
   
                                eGFR AA         59 mL/min/1.73m2 Invalid   
Interpretation   
Code                                                [facility]  
   
                                        Comment on above:   Performed By: #### 2  
241975, 45005494, 3609380275, 6981282,   
4386847, 4309772, 069122975, 0142676472, 8069912, 0345774125,   
[id number] ####[facility] (DEFAULT)[address] [place] [address]   
   
                      Albumin [Mass/Vol] 4.2 g/dL   Normal     3.5-5.0    [facility]  
   
                                        Comment on above:   Performed By: #### 2  
949174, 71550046, 4590447650, 7364212,   
0178768, 5368252, 300490229, 9197016295, 9435707, 8155897382,   
[id number] ####[facility] (DEFAULT)[address] [place] [address]   
   
                      Alk Phos   81 IU/L    Normal     32-91      [facility]  
   
                                        Comment on above:   Performed By: #### 2  
116464, 82978647, 3376309494, 4967724,   
9373115, 3803497, 344348267, 3297730354, 8058357, 2051407171,   
[id number] ####[facility] (DEFAULT)[address] [place] [address]   
   
                                                    ALT [Catalytic   
activity/Vol]   61.0 U/L        Normal          17.0-63.0       [facility]  
   
                                        Comment on above:   Performed By: #### 2  
126100, 00430216, 6551597045, 4926750,   
6787041, 2973999, 066558152, 0028369335, 5554860, 8287279448,   
[id number] ####[facility] (DEFAULT)[address] [place] [address]   
   
                                                    AST [Catalytic   
activity/Vol]   150 U/L         High            15-41           [facility]  
   
                                        Comment on above:   Performed By: #### 2  
330884, 75593336, 6594032526, 2473770,   
0240588, 6703899, 768303600, 4862053298, 8076620, 9530492421,   
[id number] ####[facility] (DEFAULT)[address] [place] [address]   
   
                      Bili Total 1.1 mg/dL  Normal     0.3-1.2    [facility]  
   
                                        Comment on above:   Performed By: #### 2  
000337, 69265480, 1430939032, 2253105,   
4710159, 8987187, 354923973, 1288889769, 5072613, 5934783414,   
[id number] ####[facility] (DEFAULT)[address] [place] [address]   
   
                      Calcium [Mass/Vol] 8.8 mg/dL  Low        8.9-10.3   [facility]  
   
                                        Comment on above:   Performed By: #### 2  
351534, 26513479, 3103554572, 6927026,   
1163818, 5435273, 274272066, 7604494035, 4976817, 4696660128,   
[id number] ####[facility] (DEFAULT)[address] [place] [address]   
   
                      Chloride [Moles/Vol] 105 mmol/L Normal     101-111    [facility]  
   
                                        Comment on above:   Performed By: #### 2  
796212, 59311990, 1305691076, 3858291,   
6728465, 0580507, 442984097, 2489247427, 2857975, 7044568536,   
[id number] ####[facility] (DEFAULT)[address] [place] [address]   
   
                      CO2 [Moles/Vol] 26 mmol/L  Normal     21-32      [facility]  
   
                                        Comment on above:   Performed By: #### 2  
874964, 38281746, 4759928265, 4723526,   
8111957, 3740769, 097911840, 6251759252, 7754651, 8896710302,   
[id number] ####[facility] (DEFAULT)[address] [place] [address]   
   
                                                    Creatinine   
[Mass/Vol]      1.37 mg/dL      High            0.90-1.30       [facility]  
   
                                        Comment on above:   Performed By: #### 2  
753259, 66680636, 2473472077, 3457383,   
2927510, 9327654, 284734934, 9545046404, 6687188, 5809651109,   
[id number] ####[facility] (DEFAULT)[address] [place] [address]   
   
                      Glucose [Mass/Vol] 124.0 mg/dL High       74.0-118.0 [facility]  
   
                                        Comment on above:   Performed By: #### 2  
609820, 67526684, 2506946554, 8838248,   
6278015, 4412692, 945747455, 6941902361, 8541126, 7205836482,   
[id number] ####[facility] (DEFAULT)[address] [place] [address]   
   
                                                    Potassium   
[Moles/Vol]     3.8 mmol/L      Normal          3.6-5.1         [facility]  
   
                                        Comment on above:   Performed By: #### 2  
394926, 86964171, 0313378377, 2665160,   
8566205, 2789276, 388464823, 3105517270, 7894013, 0336008540,   
[id number] ####[facility] (DEFAULT)[address] [place] [address]   
   
                      Protein [Mass/Vol] 7.4 g/dL   Normal     6.5-8.1    [facility]  
   
                                        Comment on above:   Performed By: #### 2  
690293, 81260229, 1598154782, 1438780,   
4456312, 8094795, 302107615, 6330402586, 4420906, 9838780249,   
[id number] ####[facility] (DEFAULT)[address] [place] [address]   
   
                      Sodium [Moles/Vol] 140.0 mmol/L Normal     136.0-144.0 [facility]  
   
                                        Comment on above:   Performed By: #### 2  
459956, 28426450, 7955221180, 6392870,   
0105177, 1684209, 063685368, 5287665630, 2250681, 6848042108,   
[id number] ####[facility] (DEFAULT)[address] [place] [address]   
   
                                                    Urea nitrogen   
[Mass/Vol]      31 mg/dL        High            8-26            [facility]  
   
                                        Comment on above:   Performed By: #### 2  
544347, 65599980, 7767126649, 9068602,   
5135112, 1976656, 114552206, 1186516313, 1618289, 8703557091,   
[id number] ####[facility] (DEFAULT)[address] [place] [address]   
   
                                                    Albumin/Globulin   
[Mass ratio]    1.3 {ratio}     Low             1.4-2.6         [facility]  
   
                                        Comment on above:   Performed By: #### 2  
111040, 09558165, 9248433081, 1839039,   
5352265, 2370180, 196563922, 3214760971, 3757739, 6350507219,   
[id number] ####[facility] (DEFAULT)[address] [place] [address]   
   
                                                    Anion gap   
[Moles/Vol]     12.8 mmol/L     Normal          5.0-19.0        [facility]  
   
                                        Comment on above:   Performed By: #### 2  
997756, 91288870, 9397912084, 8153982,   
7864159, 7206600, 927926461, 9329248127, 8784713, 2805940025,   
[id number] ####[facility] (DEFAULT)[address] [place] [address]   
   
                                                    Globulin (S)   
[Mass/Vol]      3.2 g/dL        Normal          1.5-4.3         [facility]  
   
                                        Comment on above:   Performed By: #### 2  
011974, 62002078, 0540715040, 7463391,   
4403949, 7865382, 837362739, 3573426770, 9669201, 6297216633,   
[id number] ####[facility] (DEFAULT)[address] [place] [address]   
   
                                Osmolality      287 mOsm/L      Invalid   
Interpretation   
Code                                                [facility]  
   
                                        Comment on above:   Performed By: #### 2  
569592, 80892410, 4826142763, 6164616,   
9581894, 3413812, 822058435, 3566194691, 8277781, 4566443788,   
[id number] ####[facility] (DEFAULT)[address] [place] [address]   
   
                                                    Urea   
nitrogen/Creatinine   
[Mass ratio]    22.6 mg/mg      High            4.6-16.2        [facility]  
   
                                        Comment on above:   Performed By: #### 2  
634791, 28226348, 6496967858, 4007264,   
1189256, 7763181, 761388558, 5682304063, 9248037, 0095478655,   
[id number] ####[facility] (DEFAULT)[address] [place] [address]   
   
                                                    COVID/Flu/RSV (GeneXpert)on   
2024   
   
                                                    Flu A (GXpert   
COVFLURSV)      Positive        Normal          Negative        [facility]  
   
                                        Comment on above:   Performed By: #### 7  
[id number], [id number] ####[facility]   
(DEFAULT)[address] [place] [address]   
   
                                                    Flu B (GXpert   
COVFLURSV)      Negative        Normal          Negative        [facility]  
   
                                        Comment on above:   Performed By: #### 7  
399723082, [id number] ####[facility]   
(DEFAULT)[address] [place] [address]   
   
                                                    RSV (GXpert   
COVFLURSV)      Negative        Normal          Negative        [facility]  
   
                                        Comment on above:   Performed By: #### 7  
[id number], 9896192276 ####[facility]   
(DEFAULT)[address] [place] [address]   
   
                                                    SARS-CoV-2   
(COVID-19) RNA   
MIRIAM+probe Ql (Unsp   
spec)           Negative        Normal          Negative        [facility]  
   
                                        Comment on above:   Result Comment: Perf  
ormed by PCR methodology.   
   
                                                            Performed By: #### 7  
[id number], [id number] ####[facility]   
(DEFAULT)[address] [place] [address]   
   
                                                    CRPon 2024   
   
                      CRP        8.3 mg/dL  High       <=0.5      [facility]  
   
                                        Comment on above:   Performed By: #### 2  
000100, 10459350, 9747417518, 6164801,   
8914927, 3776888, 380371264, 1436355300, 9271840, 2351309704,   
[id number] ####[facility] (DEFAULT)[address] [place] [address]   
   
                                                    CT Head or Brain w/o Contras  
ton 2024   
   
                                                    CT Head or Brain w/o   
Contrast                        Normal                          [facility]  
   
                                                    CT Maxillofacial w/o Contras  
ton 2024   
   
                                                    CT Maxillofacial w/o   
Contrast                        Normal                          [facility]  
   
                                                    CT Spine Cervical w/o Contra  
ston 2024   
   
                                                    CT Spine Cervical   
w/o Contrast                    Normal                          [facility]  
   
                                                    ED Clinical Summaryon 2024   
   
                      ED Clinical Summary            [facility]  
   
                                                    ED Note - Physicianon 2024   
   
                      ED Note - Physician            [facility]  
   
                                                    ED Note-Nursingon 2024  
   
   
                                        ED Note-Nursing     Patient admitted to   
the floor, hospitalist   
to review cultures.  
Electronically Signed   
by: Lindsey Montgomery on   
2024 16:06 EST [facility]  
   
                                        ED Note-Nursing     16fr alvarado catheter   
inserted without   
difficulty. 10ml of   
cloudy, thick,   
sediment urine came   
through the catheter,   
no further urine   
output at this time  
Electronically Signed   
by: Tamika Handy   
on 2024 11:48   
EST                 [facility]  
   
                                        ED Note-Nursing     Dr. ANGELIC mckeon.  
Electronically Signed   
by: Ya Parson   
on 2024 10:58   
EST                 [facility]  
   
                                        ED Note-Nursing     Dr. Rand notifi  
ed   
that repeat CK was   
needed per lab. Lab   
states that CK at this   
time was reading >   
4100. REsults pending   
30 minutes.  
Electronically Signed   
by: Ya Parson   
on 2024 10:16   
EST                 [facility]  
   
                      ED Note-Nursing            [facility]  
   
                                                    ED Patient Education Noteon   
2024   
   
                                                    ED Patient Education   
Note            Education Materials [facility]  
   
                                                    ED Patient Summaryon   
024   
   
                      ED Patient Summary            [facility]  
   
                                                    Extra Redon 2024   
   
                                Tube Collected  Yes             Invalid   
Interpretation   
[facility]  
   
                                        Comment on above:   Performed By: #### 2  
905878, 01493396, 5520940540, 1917525,   
7386801, 3137322, 633135864, 9506478273, 7361322, 5963636170,   
[id number] ####[facility] (DEFAULT)[address] [place] [address]   
   
                                                    Myoglobinon 2024   
   
                      Myoglobin [Mass/Vol] ng/mL      High       17.4-105.7 [facility]  
   
                                        Comment on above:   Performed By: #### 2  
652173, 92810266, 3057931737, 8720917,   
3116667, 3467331, 002347002, 2880844167, 4171399, 5458616053,   
[id number] ####[facility] (DEFAULT)[address] [place] [address]   
   
                                                    Procalcitoninon 2024   
   
                      Procalcitonin 1.776 ng/mL High       0.500-1.000 [facility]  
   
                                        Comment on above:   Performed By: #### 7  
[id number] ####[facility]   
(DEFAULT)[address] [place] [address]   
   
                                                    TSH w/ Reflex to FT4on    
   
                      TSH Qn     1.83 m[IU]/L Normal     0.45-5.33  [facility]  
   
                                        Comment on above:   Performed By: #### 2  
527982, 84596347, 5932983743, 5430119,   
2246605, 4852852, 370793069, 2184189389, 9033604, 0090113007,   
[id number] ####[facility] (DEFAULT)[address] [place] [address]   
   
                                                    TnI HSon 2024   
   
                                                    Troponin I High   
Sensitivity               594.1 pg/mL               Critically   
abnormal                  <=20.0                    [facility]  
   
                                        Comment on above:   Result Comment: Crit  
ical result TNIHS 594.1 pg/mL called to   
and   
read back by Nabila Terrell at 05-Mar-2024 23:08 by Betsy.   
   
                                                            Performed By: #### 5  
[id number] ####[facility] (DEFAULT)[address] [place] [address]   
   
                                                    Troponin I High   
Sensitivity               610.4 pg/mL               Critically   
abnormal                  <=20.0                    [facility]  
   
                                        Comment on above:   Result Comment: Crit  
ical result TNIHS 610.4 pg/mL called to   
and   
read back by Adalid Wheeler at 05-Mar-2024 15:44 by Betsy.   
   
                                                            Performed By: #### 5  
[id number] ####[facility] (DEFAULT)[address] [place] [address]   
   
                                                    Troponin I High   
Sensitivity               485.2 pg/mL               Critically   
abnormal                  <=20.0                    [facility]  
   
                                        Comment on above:   Result Comment: Crit  
ical result TNIHS 485.2 pg/mL called to   
and   
read back by Migdalia Parson RN ER at 05-Mar-2024 10:03 by   
[email or web address].   
   
                                                            Performed By: #### 2  
171354, 70730979, 3485250483, 7766249,   
0111687, 8625999, 168889166, 7997451805, 1207703, 7333179959,   
[id number] ####[facility] (DEFAULT)[address] [place] [address]   
   
                                                    UA Diyiw0ug 2024   
   
                      UA Amorph. Few        [facility]  
   
                                        Comment on above:   Order Comment: Urina  
lysis Microscopic order added on by   
[employer]   
Expert Rules system.   
   
                                                            Performed By: #### 1  
[id number], 7340369, 63027155 ####[facility] (DEFAULT)[address] [place] [address]   
   
                      UA Bacteria 3+         [facility]  
   
                                        Comment on above:   Order Comment: Urina  
lysis Microscopic order added on by   
[employer]   
Expert Rules system.   
   
                                                            Performed By: #### 1  
[id number], 9648771, 40600135 ####[facility] (DEFAULT)[address] [place] [address]   
   
                      UA RBC     5-10       [facility]  
   
                                        Comment on above:   Order Comment: Urina  
lysis Microscopic order added on by   
[employer]   
Expert Rules system.   
   
                                                            Performed By: #### 1  
[id number], 4737839, 57682768 ####[facility] (DEFAULT)[address] [place] [address]   
   
                      UA Squam Epi Rare       [facility]  
   
                                        Comment on above:   Order Comment: Urina  
lysis Microscopic order added on by   
[employer]   
Expert Rules system.   
   
                                                            Performed By: #### 1  
[id number], 4366351, 49634587 ####[facility] (DEFAULT)[address] [place] [address]   
   
                      UA WBC     0-2        [facility]  
   
                                        Comment on above:   Order Comment: Urina  
lysis Microscopic order added on by   
[employer]   
Expert Rules system.   
   
                                                            Performed By: #### 1  
[id number], 8861405, 68150163 ####[facility] (DEFAULT)[address] [place] [address]   
   
                                                    UA w Culture if Ind Standard  
on 2024   
   
                      Breakpoint UA ********   [facility]  
   
                                        Comment on above:   Performed By: #### 1  
[id number], 2307552, 21545168 ####[facility] (DEFAULT)[address] [place] [address]   
   
                      Color (U)  Yellow     Normal                [facility]  
   
                                        Comment on above:   Performed By: #### 1  
[id number], 3225835, 35745452 ####[facility] (DEFAULT)[address] [place] [address]   
   
                      Culture?   Yes        Normal                [facility]  
   
                                        Comment on above:   Result Comment: Resu  
lt created by rule GL_MAGR_ADD_UA_CULT1   
Result created by rule GL_MAGR_ADD_UA_CULT1   
   
                                                            Performed By: #### 1  
[id number], 6146018, [id number] ####[facility] (DEFAULT)[address] [place] [address]   
   
                                                    Glucose (U)   
[Mass/Vol]      100 mg/dL       Normal                          [facility]  
   
                                        Comment on above:   Performed By: #### 1  
[id number], 3864183, 83858258 ####[facility] (DEFAULT)[address] [place] [address]   
   
                      Ketones Ql (U) TRACE      Normal                [facility]  
   
                                        Comment on above:   Performed By: #### 1  
[id number], 6040684, [id number] ####[facility] (DEFAULT)[address] [place] [address]   
   
                                Micro?          Indicated       Invalid   
Interpretation   
Code                                                [facility]  
   
                                        Comment on above:   Result Comment: Resu  
lt created by rule GL_MAGR_ADD_UA_MICRO   
   
                                                            Performed By: #### 1  
[id number], [id number], [id number] ####[facility] (DEFAULT)[address] [place] [address] 31422   
   
                      UA Bilirubin Negative   Normal                [facility]  
   
                                        Comment on above:   Performed By: #### 1  
[id number], [id number], [id number] ####[facility] (DEFAULT)[address] [place] [address] 77908   
   
                      UA Blood   LARGE      Abnormal   NEGATIVE   [facility]  
   
                                        Comment on above:   Performed By: #### 1  
[id number], [id number], [id number] ####[facility] (DEFAULT)[address] [place] [address] 15584   
   
                      UA Clarity SL CLOUDY  Abnormal   CLEAR      [facility]  
   
                                        Comment on above:   Performed By: #### 1  
[id number], [id number], [id number] ####[facility] (DEFAULT)[address] [place] [address] 69339   
   
                      UA Leuk Est TRACE      Abnormal   NEGATIVE   [facility]  
   
                                        Comment on above:   Performed By: #### 1  
[id number], [id number], [id number] ####[facility] (DEFAULT)[address] [place] [address]951   
   
                      UA Nitrite Negative   Normal     NEGATIVE   [facility]  
   
                                        Comment on above:   Performed By: #### 1  
[id number], [id number], [id number] ####[facility] (DEFAULT)[address] [place] [address] 07758   
   
                      UA pH      5.5        Normal     5-8        [facility]  
   
                                        Comment on above:   Performed By: #### 1  
[id number], [id number], [id number] ####[facility] (DEFAULT)[address] [place] [address]39   
   
                      UA Protein 100        Abnormal   NEGATIVE   [facility]  
   
                                        Comment on above:   Performed By: #### 1  
[id number], [id number], [id number] ####[facility] (DEFAULT)[address] [place] [address]   
   
                      UA Spec Grav >=1.030    Normal     1.001-1.035 [facility]  
   
                                        Comment on above:   Performed By: #### 1  
[id number], [id number], [id number] ####[facility] (DEFAULT)[address] [place] [address]   
   
                      UA Urobilinogen 0.2 mg/dL  Normal     0.2-1.0    [facility]  
   
                                        Comment on above:   Performed By: #### 1  
[id number], [id number], [id number] ####[facility] (DEFAULT)[address] [place] [address]   
   
                      Urine Source Clean Catch Normal                [facility]  
   
                                        Comment on above:   Performed By: #### 1  
[id number], [id number], [id number] ####[facility] (DEFAULT)[address] [place] [address]   
   
                                                    US Echocardiogram Completeon  
 2024   
   
                                                    US Echocardiogram   
Complete                        Normal                          [facility]  
   
                                                    Vit B12 Lvlon 2024   
   
                      Vit B12    321        Normal     180-914    [facility]  
   
                                        Comment on above:   Performed By: #### 2  
[id number], [id number], [id number],   
[id number], [id number], [id number], [id number], [id number], [id number],   
[id number] ####[facility] (DEFAULT)[address] [place] [address]   
   
                                                    XR Chest 1 View Frontalon    
   
                                                    XR Chest 1 View   
Frontal                         [facility]  
   
                                                    XR Pelvis 1 or 2 Viewson    
   
                                                    XR Pelvis 1 or 2   
Views                           [facility]  
   
                                                    Outside Recordson 2023  
   
   
                                        Outside Records     [id number]  
20  
[id number]  
.00OTGTIFF          [facility]  
   
                                                    Coding Summaryon 2023   
   
                      Coding Summary            [facility]  
   
                                                    Office/Clinic Noteon   
023   
   
                      Office/Clinic Note            [facility]  
   
                                                    ED Clinical Summaryon 2023   
   
                      ED Clinical Summary            [facility]  
   
                                                    ED Note - Physicianon 2023   
   
                      ED Note - Physician            [facility]  
   
                                                    ED Patient Education Noteon   
2023   
   
                                                    ED Patient Education   
Note                            [facility]  
   
                                                    ED Patient Summaryon   
023   
   
                      ED Patient Summary            [facility]  
   
                                                    Outside Recordson 10-  
   
   
                                        Outside Records     [id number]  
31  
[id number]#1.0  
0OTGTIFF            [facility]  
   
                                                    Outside Recordson 10-  
   
   
                                        Outside Records     [id number]  
40  
[id number]#1.00  
OTGTIFF             [facility]  
   
                                                    Outside Recordson 2023  
   
   
                                        Outside Records     [id number]  
51  
[id number]#1.0  
0OTGTIFF            [facility]  
   
                                                    XR ANKLE RIGHT STANDARDon    
   
                                                    XR ANKLE RIGHT   
STANDARD                                Radiology exam is   
complete. No   
Radiologist dictation.   
Please follow up with   
ordering provider.  
  
  
  
  
Final result        Normal                                  [place]  
  
  
  
Vital Signs  
  
  
                      Date Time  Vital Sign Value      Performing Clinician Marta alfaro  
   
                                                    10-   
14:                              Blood Pressure   
Location                                            Reny Galea  
Work Phone:   
[phone]                           [facility]  
   
                                                    10-   
14:          Body temperature    98.6 [degF]         Reny Galea  
Work Phone:   
[phone]                           [facility]  
   
                                                    10-   
14:                              Diastolic blood   
pressure                  65 mm[Hg]                 Reny Galea  
Work Phone:   
[phone]                           [facility]  
   
                                                    10-   
14:          Heart rate          85 /min             Reny Galea  
Work Phone:   
[phone]                           [facility]  
   
                                                    10-   
14:          Respiratory rate    16 /min             Reny Galea  
Work Phone:   
[phone]                           [facility]  
   
                                                    10-   
14:                              Systolic blood   
pressure                  131 mm[Hg]                Reny Galea  
Work Phone:   
[phone]                           [facility]  
  
  
  
Encounters  
  
  
                          Encounter Date Encounter Type Care Provider Facility  
   
                          Start: 10- ambulatory   [facility]  
ty:[id number]  
   
                                                    Start: 10-  
End: 10-     ambulatory          Reny J Galea      Facility:[facility]  
   
                                                    Start: 10-  
End: 10-                         Patient encounter   
procedure                               Reny J Galea  
Work Phone:   
[phone]                           [facility]   
[facility]  
Work Phone:   
[phone]  
   
                                                    Start: 2024  
End: 2024     ambulatory          Reny J Galea      Facility:[facility]  
   
                                                    Start: 2024  
End: 2024                         Patient encounter   
procedure                               Reny J Galea  
Work Phone:   
[phone]                           [facility]   
[facility]  
Work Phone:   
[phone]  
   
                                                    Start: 2024  
End: 2024     ambulatory          BHAVIN EARL           Not Available  
   
                                                    Start: 2024  
End: 2024                         Evaluation and   
management of inpatient   Bryon Arzola             Facility:[facility]  
   
                                                    Start: 2024  
End: 2024     ambulatory          Bryon Arzola       Facility:[facility]  
   
                                                    Start: 2024  
End: 2024                         Emergency department   
patient visit             Bryon Arzola             Facility:[facility]  
   
                                                    Start: 2024  
End: 2024     ambulatory          Max HU Faizan      Facility:[facility]  
   
                                                    Start: 2024  
End: 2024     ambulatory          Bryon Arzola       [facility]  
IC  
   
                                                    Start: 2024  
End: 2024     ambulatory          [facility]   
[facility]  
   
                                                    Start: 2024  
End: 2024     ambulatory          Bryon Arzola       Facility[facility]  
IC  
   
                          Start: 2024 ambulatory   [facility]   
[facility]  
   
                          Start: 2024 Telephone encounter [facility]   
[facility]  
   
                                        Comment on above:   Hospital Follow-up   
   
                                                    Start: 2024  
End: 2024                         Evaluation and   
management of inpatient   Bryon Arzola             Facility:[facility]  
   
                                                    Start: 2024  
End: 2024                         Emergency department   
patient visit             Bryon Arzola             Facility:[facility]  
   
                                                    Start: 2023  
End: 2023     ambulatory          Bryon Arzola       [facility]  
IC  
   
                                                    Start: 2023  
End: 2023                         Emergency department   
patient visit             Bryon Arzola             Facility:[facility]  
  
  
  
Procedures  
  
  
                          Date         Procedure    Procedure Detail Performing   
Clinician  
   
                          Start: 2024 Follow-up visit Follow-up    WILLIAM   
[facility]  
   
                                        Start: 2020   Cystourethroscopy wi  
th   
dilation of urethral   
stricture                                           Renylinda Blanton  
Work Phone:   
[phone]  
   
                                        Start: 2019   Cystourethroscopy wi  
th   
dilation of urethral   
stricture                                           Reny Galea  
Work Phone:   
[phone]  
   
                                        Comment on above:   2009, 3/1/2011,  
 2013, 10/17/2013, 10/17/2013,   
2014,   
2015, 10/13/2015, 2016, 10/17/2017, 2018, 2019   
   
                                        Start: 2011   Cystourethroscopy wi  
th   
internal male urethrotomy                           Reny Galea  
Work Phone:   
[phone]  
   
                                        Comment on above:   2009, 20  
11   
   
                                       Cholecystectomy              Reny Galea  
Work Phone:   
[phone]  
   
                                       Partial resection of colon              A  
lysha Galea  
Work Phone:   
[phone]  
   
                                       Teleradiotherapy procedure              A  
lysha Galea  
Work Phone:   
[phone]  
   
                                        Comment on above:      
   
                                       Transurethral prostatectomy                
Reny Galea  
Work Phone:   
[phone]  
   
                                        Comment on above:   Dr. Combs   
  
  
  
Plan of Treatment  
  
  
                          Date         Care Activity Detail       Author  
   
                                                    Start:   
2023          Influenza vaccination Influenza Vaccine   [facility]   
[facility]  
   
                                                    Start:   
10-          Fall Risk Screening Fall Risk Screening [facility]   
[facility]  
   
                                                    Start:   
10-                              Administration of   
varicella zoster vaccine                Zoster (Shingles)   
Vaccine (1 of 2)                        [facility]   
[facility]  
   
                                                    Start:   
10-                              DTaP,Tdap and Td Vaccines   
(1 - Tdap)                              DTaP,Tdap and Td   
Vaccines (1 - Tdap)                     [facility]   
[facility]  
   
                                                    Start:   
10-          Adult BMI Screening Adult BMI Screening [facility]   
System  
   
                                                    Start:   
10-          Depression Screening Depression Screening [facility]   
System  
   
                                                    Start:   
10-          Tobacco Screening   Tobacco Screening   [facility]   
[facility]  
   
                                                    Start:   
1935                              Medicare Annual Wellness   
Visit                                   Medicare Annual   
Wellness Visit                          [facility]   
System  
  
  
  
Payers  
  
  
                          Date         Payer Category Payer        Policy ID  
   
                          2019   Medicare                  [id number]  
   
                                2016      Private Health Insurance [employer]   
COMMERCIAL [employer]   
COMMERCIAL [id number]   
2016-Present po box   
[address] [place] [address]               [id number]  
[id number]  
   
                          2016   Private Health Insurance              [id number]  
[id number]  
   
                                10-      Medicare        MEDICARE MEDICAR  
[address]   
[id number]   
10/1/2000-Present   
[phone] [address] [address]   
[place] [address]                  [id number]  
[id number]  
   
                          10-   Medicare                  [id number]  
   
                          1935   Unknown                   [id number]   
[id number]  
[id number]  
   
                          1935   Unknown                   [id number]   
[id number]  
[id number]  
   
                          1935   Unknown                   [id number]   
[id number]  
[id number]  
   
                          1935   Unknown                   [id number]   
[id number]  
[id number]  
   
                          1935   Unknown                   [id number]   
[id number]  
[id number]  
   
                          1935   Unknown                   [id number]   
[id number]  
[id number]  
   
                          1935   Unknown                   [id number]   
[id number]  
[id number]  
   
                          1935   Unknown                   [id number]   
[id number]  
[id number]  
   
                          1935   Unknown                   [id number]   
[id number]  
[id number]  
   
                          1935   Unknown                   [id number]   
[id number]  
[id number]  
   
                          1935   Unknown                   [id number]   
[id number]  
[id number]  
   
                          1935   Unknown                   [id number]   
[id number]  
[id number]  
   
                          1935   Unknown                   [id number]   
[id number]  
[id number]  
   
                          1935   Unknown                   [id number]   
[id number]  
[id number]  
   
                          1935   Unknown                   [id number]   
[id number]  
[id number]  
  
  
  
Social History  
  
  
                          Date         Type         Detail       Facility  
   
                                                            [facility]  
[facility]                                    T[facility] smoking   
consumption unknown                     [facility]  
   
                                        Start: 03-   History of Social   
function                                            [facility]  
   
                          Start: 03- Childcare                 [facility]  
   
                                       Childcare    Unknown      [facility]   
System  
   
                          Start: 1935 Sex Assigned At Birth Not on file  [facility]  
[facility]   
[facility]  
   
                                                    Start: 2020  
End: 10-           Tobacco smoking status    Never smoked tobacco   
(finding)                               [facility]  
  
  
  
Functional Status  
  
  
                          Date         Assessment   Result       Facility  
   
                          10-   Functional Status N/A          [facility]  
  
  
  
Clinical Notes 2024 to 10-  
Telephone Encounter - Rosa Hernandez - 2024 8:47 AM EDTTelephone Encounter  
 - Kalpana Gu MA - 2024 8:47 AM EDTTelephone Encounter - Kalpana Gu MA - 2024 8:47 AM EDT  
  
                                Note Date & Type Note            Facility  
   
                                                    10- Hospital Discharg  
e   
instructions                              
  
  
Patient Education  
  
  
10/03/2024 15:28:44  
  
  
Acute Urinary Retention, Male  
  
  
Acute Urinary Retention, Male  
Acute urinary retention is a   
condition in which a person is   
unable to pass urine or can   
only pass a little urine. This   
condition can happen suddenly   
and last for a short time. If   
left untreated, it can become   
long-term (chronic) and result   
in kidney damage or other   
serious complications.  
What are the causes?  
This condition may be caused   
by:  
Obstruction or narrowing of   
the tube that drains the   
bladder (urethra). This may be   
caused by surgery, problems   
with nearby organs, or injury   
to the bladder or urethra.  
Problems with the nerves in   
the bladder.  
Tumors in the area of the   
pelvis, bladder, or urethra.  
Certain medicines.  
Bladder or urinary tract   
infection.  
Constipation.  
What increases the risk?  
This condition is more likely   
to develop in older men. As   
men age, their prostate may   
become larger and may start to   
press or squeeze on the   
bladder or the urethra. Other   
chronic health conditions can   
increase the risk of acute   
urinary retention. These   
include:  
Diseases such as multiple   
sclerosis.  
Spinal cord injuries.  
Diabetes.  
Degenerative cognitive   
conditions, such as delirium   
or dementia.  
Psychological conditions. A   
man may hold his urine due to   
trauma or because he does not   
want to use the bathroom.  
What are the signs or   
symptoms?  
Symptoms of this condition   
include:  
Trouble urinating.  
Pain in the lower abdomen.  
How is this diagnosed?  
This condition is diagnosed   
based on a physical exam and   
your medical history. You may   
also have other tests,   
including:  
An ultrasound of the bladder   
or kidneys or both.  
Blood tests.  
A urine analysis.  
Additional tests may be   
needed, such as a CT scan,   
MRI, and kidney or bladder   
function tests.  
How is this treated?  
Treatment for this condition   
may include:  
Medicines.  
Placing a thin, sterile tube   
(catheter) into the bladder to   
drain urine out of the body.   
This is called an indwelling   
urinary catheter. After it is   
inserted, the catheter is held   
in place with a small balloon   
that is filled with sterile   
water. Urine drains from the   
catheter into a collection bag   
outside of the body.  
Behavioral therapy.  
Treatment for other   
conditions.  
If needed, you may be treated   
in the hospital for kidney   
function problems or to manage   
other complications.  
Follow these instructions at   
home:  
Medicines  
Take over-the-counter and   
prescription medicines only as   
told by your health care   
provider. Avoid certain   
medicines, such as   
decongestants, antihistamines,   
and some prescription   
medicines. Do not take any   
medicine unless your health   
care provider approves.  
If you were prescribed an   
antibiotic medicine, take it   
as told by your health care   
provider. Do not stop using   
the antibiotic even if you   
start to feel better.  
General instructions  
Do not use any products that   
contain nicotine or tobacco.   
These products include   
cigarettes, chewing tobacco,   
and vaping devices, such as   
e-cigarettes. If you need help   
quitting, ask your health care   
provider.  
Drink enough fluid to keep   
your urine pale yellow.  
If you have an indwelling   
urinary catheter, follow the   
instructions from your health   
care provider.   
Monitor any changes in your   
symptoms. Tell your health   
care provider about any   
changes.  
If instructed, monitor your   
blood pressure at home. Report   
changes as told by your health   
care provider.  
Keep all follow-up visits.   
This is important.  
Contact a health care provider   
if:  
You have uncomfortable bladder   
contractions that you cannot   
control (spasms).  
You leak urine with the   
spasms.  
Get help right away if:  
You have chills or a fever.  
You have blood in your urine.  
You have a catheter and the   
following happens:  
?Your catheter stops draining   
urine.  
?Your catheter falls out.  
Summary  
Acute urinary retention is a   
condition in which a person is   
unable to pass urine or can   
only pass a little urine. If   
left untreated, this condition   
can result in kidney damage or   
other serious complications.  
An enlarged prostate may cause   
this condition. As men age,   
their prostate gland may   
become larger and may press or   
squeeze on the bladder or the   
urethra.  
Treatment for this condition   
may include medicines and   
placement of an indwelling   
urinary catheter.  
Monitor any changes in your   
symptoms. Tell your health   
care provider about any   
changes.  
This information is not   
intended to replace advice   
given to you by your health   
care provider. Make sure you   
discuss any questions you have   
with your health care   
provider.  
Document Revised: 2021   
Document Reviewed: 2021  
[employer] Patient Education   
 [employer]  
  
  
  
Follow Up Care  
  
  
2024 10:58:26  
  
  
With:Franny MEDINA, Reny HU,   
URL  
Address:  
  
When:  
Unknown  
Comments:after cysto/UD                 [facility]   
[facility]  
Work Phone: [phone]  
   
                                        10- Note     Patient Education  
Urology  
Acute Urinary Retention, Male  
(Inserted Image. Unable to   
display)  
Acute urinary retention is a   
condition in which a person is   
unable to pass urine or can   
only pass a little urine. This   
condition can happen suddenly   
and last for a short time. If   
left untreated, it can become   
long-term (chronic) and result   
in kidney damage or other   
serious complications.  
What are the causes?  
This condition may be caused   
by:  
? Obstruction or narrowing of   
the tube that drains the   
bladder (urethra). This may be   
caused by surgery, problems   
with nearby organs, or injury   
to the bladder or urethra.  
? Problems with the nerves in   
the bladder.  
? Tumors in the area of the   
pelvis, bladder, or urethra.  
? Certain medicines.  
? Bladder or urinary tract   
infection.  
? Constipation.  
What increases the risk?  
This condition is more likely   
to develop in older men. As   
men age, their prostate may   
become larger and may start to   
press or squeeze on the   
bladder or the urethra. Other   
chronic health conditions can   
increase the risk of acute   
urinary retention. These   
include:  
? Diseases such as multiple   
sclerosis.  
? Spinal cord injuries.  
? Diabetes.  
? Degenerative cognitive   
conditions, such as delirium   
or dementia.  
? Psychological conditions. A   
man may hold his urine due to   
trauma or because he does not   
want to use the bathroom.  
What are the signs or   
symptoms?  
Symptoms of this condition   
include:  
? Trouble urinating.  
? Pain in the lower abdomen.  
How is this diagnosed?  
This condition is diagnosed   
based on a physical exam and   
your medical history. You may   
also have other tests,   
including:  
? An ultrasound of the bladder   
or kidneys or both.  
? Blood tests.  
? A urine analysis.  
? Additional tests may be   
needed, such as a CT scan,   
MRI, and kidney or bladder   
function tests.  
How is this treated?  
Treatment for this condition   
may include:  
? Medicines.  
? Placing a thin, sterile tube   
(catheter) into the bladder to   
drain urine out of the body.   
This is called an indwelling   
urinary catheter. After it is   
inserted, the catheter is held   
in place with a small balloon   
that is filled with sterile   
water. Urine drains from the   
catheter into a collection bag   
outside of the body.  
? Behavioral therapy.  
? Treatment for other   
conditions.  
If needed, you may be treated   
in the hospital for kidney   
function problems or to manage   
other complications.  
Follow these instructions at   
home:  
Medicines  
? Take over-the-counter and   
prescription medicines only as   
told by your health care   
provider. Avoid certain   
medicines, such as   
decongestants, antihistamines,   
and some prescription   
medicines. Do not take any   
medicine unless your health   
care provider approves.  
? If you were prescribed an   
antibiotic medicine, take it   
as told by your health care   
provider. Do not stop using   
the antibiotic even if you   
start to feel better.  
General instructions  
? Do not use any products that   
contain nicotine or tobacco.   
These products include   
cigarettes, chewing tobacco,   
and vaping devices, such as   
e-cigarettes. If you need help   
quitting, ask your health care   
provider.  
? Drink enough fluid to keep   
your urine pale yellow.  
? If you have an indwelling   
urinary catheter, follow the   
instructions from your health   
care provider.  
? Monitor any changes in your   
symptoms. Tell your health   
care provider about any   
changes.  
? If instructed, monitor your   
blood pressure at home. Report   
changes as told by your health   
care provider.  
? Keep all follow-up visits.   
This is important.  
Contact a health care provider   
if:  
? You have uncomfortable   
bladder contractions that you   
cannot control (spasms).  
? You leak urine with the   
spasms.  
Get help right away if:  
? You have chills or a fever.  
? You have blood in your   
urine.  
? You have a catheter and the   
following happens:  
? Your catheter stops draining   
urine.  
? Your catheter falls out.  
Summary  
? Acute urinary retention is a   
condition in which a person is   
unable to pass urine or can   
only pass a little urine. If   
left untreated, this condition   
can result in kidney damage or   
other serious complications.  
? An enlarged prostate may   
cause this condition. As men   
age, their prostate gland may   
become larger and may press or   
squeeze on the bladder or the   
urethra.  
? Treatment for this condition   
may include medicines and   
placement of an indwelling   
urinary catheter.  
? Monitor any changes in your   
symptoms. Tell your health   
care provider about any   
changes.  
This information is not   
intended to replace advice   
given to you by your health   
care provider. Make sure you   
discuss any questions you have   
with your health care   
provider.  
Document Revised: 2021   
Document Reviewed: 2021  
[employer] Patient Education ?   
 [employer]                      [facility]  
   
                                        2024 Miscellaneous Notes Formattin  
g of this note might   
be different from the   
original.  
Message from the 3/8/24   
discharge list per [facility].  
He signed off patient care   
from .  
Dx Elevated Troponin  
Patient to f/u in 1 to 2 weeks   
post d/c  
bvb  
Electronically signed by Rosa Hernandez at 2024 8:48   
AM EDT  
Formatting of this note might   
be different from the   
original.  
Called pt to schedule f/u appt   
from recent IP stay. No   
Answer. No Machine. UCHE  
Electronically signed by   
Kalpana Gu MA at   
2024 10:18 AM EDT  
Formatting of this note might   
be different from the   
original.  
Pt currently at [facility], [phone], Called to   
scheduled f/u appt, No Answer.   
LMOM. UCHE  
Electronically signed by   
Kalpana Gu MA at   
2024 1:10 PM EDT  
documented in this encounter            [facility]  
   
                                                    2024 Telephone encount  
er   
Note                                    Formatting of this note might   
be different from the   
original.  
Message from the 3/8/24   
discharge list per [facility].  
He signed off patient care   
from .  
Dx Elevated Troponin  
Patient to f/u in 1 to 2 weeks   
post d/c  
bvb  
Electronically signed by Rosa Hernandez at 2024 8:48   
AM EDT  
                                        [facility]  
   
                                                    2024 Telephone encount  
er   
Note                                    Formatting of this note might   
be different from the   
original.  
Called pt to schedule f/u appt   
from recent IP stay. No   
Answer. No Machine. EDWINW  
Electronically signed by   
Kalpana Gu MA at   
2024 10:18 AM EDT  
                                        [facility]  
   
                                                    2024 Telephone encount  
er   
Note                                    Formatting of this note might   
be different from the   
original.  
Pt currently at [facility], [phone], Called to   
scheduled f/u appt, No Answer.   
LMOM. UCHE  
Electronically signed by   
Kalpana Gu MA at   
2024 1:10 PM EDT  
                                        [facility]  
   
                                        2024 Note     Dr. ANGELIC Mendez calls a  
nd speaks   
with Dr. Rand regarding   
plan of care and possible   
admission.  
Electronically Signed by:   
Ya Parson on 2024   
11:29 [facility]  
   
                                        2024 Note     Dr. Rand speaks  
 with Dr. Brower, cardiologist   
regarding plan of care.  
Electronically Signed by:   
Ya Parson on 2024   
10:55 [facility]  
   
                                        Evaluation + Plan note   
  
  
Future Appointments  
  
  
Appointment Date:10/03/2024   
02:30:00 PM  
Scheduled Provider:Reny Lo  
Location:[facility]  
Appointment Type:URO Office   
Visit  
                                        [facility]   
[facility]  
Work Phone: [phone]  
   
                                        Hospital course Narrative   
  
  
No data available for this   
section                                 [facility]   
[facility]  
Work Phone: [phone]  
   
                                                    Hospital Discharge   
instructions                              
  
  
No data available for this   
section                                 [facility]   
[facility]  
Work Phone: [phone]  
   
                                        Instructions        Not on filedocumente  
d in this   
encounter                               [facility]  
   
                                        Progress note         
  
  
No data available for this   
section                                 [facility]   
[facility]  
Work Phone: [phone]  
  
  
  
Summary Purpose  
  
  
                                                      
  
  
  
Family History  
No Family History Records FoundNo Family History Records FoundNo Family History 
Records FoundNo Family History Records Found  
  
No data available for this section  
  
No Family History Records Found  
  
No data available for this section  
  
  
  
Advance Directives  
No Advanced Directives Records FoundNo Advanced Directives Records FoundNo 
Advanced Directives Records FoundNo Advanced Directives Records FoundNo Advanced
 Directives Records Found  
  
Additional Source Comments  
  
  
  
                                                    PREGNANCY (unrecognized sect  
ion and content)  
   
                                                    No Pregnancy Status Records   
FoundNo Pregnancy Status Records FoundNo Pregnancy   
Status   
Records FoundNo Pregnancy Status Records FoundNo Pregnancy Status Records Found  
  
  
  
  
                                                    INFORMATION SOURCE (unrecogn  
ized section and content)  
   
                                          
  
  
  
                                        DATE CREATED        AUTHOR  
   
                                2018                      [facility]  
  
  
  
                                DATE CREATED    AUTHOR          AUTHOR'S ORGANIZ  
ATION  
   
                                2024                      [facility]  
[facility]  
  
  
  
                                DATE CREATED    AUTHOR          AUTHOR'S ORGANIZ  
ATION  
   
                                2024                      [facility]  
l  
  
  
  
                                DATE CREATED    AUTHOR          AUTHOR'S ORGANIZ  
ATION  
   
                                2024                      [facility]  
[facility]  
  
  
  
                                DATE CREATED    AUTHOR          AUTHOR'S ORGANIZ  
ATION  
   
                                10/04/2024                      [facility]  
  
  
  
  
  
                                                    Reason for Visit (unrecogniz  
ed section and content)  
   
                                          
  
  
  
                                Reason          Onset Date      Comments  
   
                                Hospital Follow-up 2024        
  
  
  
  
  
                                                    Patient Care team informatio  
n (unrecognized section and content)  
   
                                                      
  
  
Personnel  
  
  
Name: BRYON ARZOLA MD  
Address:  
Address: [address] [place] [address]  
  
  
  
Personnel  
  
  
Name: BRYON ARZOLA MD  
Address:  
Address: [address] [place] [address]  
  
  
FOR RECORDS PERTAINING TO PATIENTS WHO ARE OR HAVE BEEN ENROLLED IN A CHEMICAL 
DEPENDENCY/SUBSTANCEABUSE PROGRAM, SOME INFORMATION MAY BE OMITTED. This 
clinical summary was aggregated from multiple sources. Caution should be 
exercised in using it in the provision of clinical care. This summary normalizes
 information from multiple sources, and as a consequence, information in this 
document may materially change the coding, format and clinical context of 
patient data. In addition, data may be omitted in some cases. CLINICAL DECISIONS
 SHOULD BE BASED ON THE PRIMARY CLINICAL RECORDS. [facility]. provides
 no warranty or guarantee of the accuracy or completeness of information in this
 document.

## 2024-10-26 VITALS
DIASTOLIC BLOOD PRESSURE: 55 MMHG | OXYGEN SATURATION: 93 % | TEMPERATURE: 98.1 F | HEART RATE: 78 BPM | SYSTOLIC BLOOD PRESSURE: 148 MMHG

## 2024-10-26 VITALS
SYSTOLIC BLOOD PRESSURE: 138 MMHG | OXYGEN SATURATION: 91 % | DIASTOLIC BLOOD PRESSURE: 66 MMHG | TEMPERATURE: 98.2 F | HEART RATE: 73 BPM

## 2024-10-26 LAB
ADD MANUAL DIFF: YES
ALANINE AMINOTRANSFERASE: 12 U/L (ref 16–63)
ALBUMIN GLOBULIN RATIO: 0.8
ALBUMIN LEVEL: 2.8 G/DL (ref 3.4–5)
ALKALINE PHOSPHATASE: 74 U/L (ref 46–116)
ANION GAP: 12.3
ASPARTATE AMINO TRANSFERASE: 13 U/L (ref 15–37)
BASOPHILS ABS MANUAL: 0 10^3/UL (ref 0–0.1)
BASOPHILS NFR SPEC MANUAL: 0 % (ref 0.2–2)
BLOOD UREA NITROGEN: 31 MG/DL (ref 7–18)
CALCIUM: 8.8 MG/DL (ref 8.5–10.1)
CARBON DIOXIDE: 26.1 MMOL/L (ref 21–32)
CHLORIDE: 108 MMOL/L (ref 98–107)
CO2 BLD-SCNC: 26.1 MMOL/L (ref 21–32)
EOSINOPHILS ABSOLUTE MANUAL: 0 10^3/UL (ref 0–0.7)
EOSINOPHILS PERCENT MANUAL: 0 % (ref 0.9–7)
ESTIMATED GFR (AFRICAN AMERICA: 44
ESTIMATED GFR (NON-AFRICAN AME: 36
GLOBULIN: 3.5 G/DL
GLUCOSE BLD-MCNC: 138 MG/DL (ref 74–106)
HCT VFR BLD CALC: 35.7 % (ref 42–54)
HEMATOCRIT: 35.7 % (ref 42–54)
HEMOGLOBIN: 11.8 G/DL (ref 14–18)
LYMPHOCYTES ABSOLUTE MANUAL: 0.37 10^3/UL (ref 1.2–3.8)
LYMPHOCYTES PERCENT MANUAL: 3 % (ref 20.5–60)
MCV RBC: 89.9 FL (ref 80–94)
MEAN CORPUSCULAR HEMOGLOBIN: 29.7 PG (ref 25.9–34)
MEAN CORPUSCULAR HGB CONC: 33.1 G/DL (ref 29.9–35.2)
MEAN CORPUSCULAR VOLUME: 89.9 FL (ref 80–94)
MONOCYTES ABSOLUTE MANUAL: 0.37 10^3/UL (ref 0.3–0.8)
MONOCYTES PERCENT MANUAL: 3 % (ref 1.7–12)
PLATELET # BLD: 229 10^3/UL (ref 150–450)
PLATELET COUNT: 229 10^3/UL (ref 150–450)
POTASSIUM SERPLBLD-SCNC: 4.4 MMOL/L (ref 3.5–5.1)
POTASSIUM: 4.4 MMOL/L (ref 3.5–5.1)
RED BLOOD COUNT: 3.97 10^6/UL (ref 4.7–6.1)
SEGMENTED NEUT ABSOLUTE MANUAL: 11.84 10^3/UL (ref 1.4–6.5)
SEGMENTED NEUTROPHILS % MANUAL: 94 (ref 43–75)
SODIUM BLD-SCNC: 142 MMOL/L (ref 136–145)
SODIUM: 142 MMOL/L (ref 136–145)
TOTAL PROTEIN: 6.3 G/DL (ref 6.4–8.2)
WBC # BLD: 12.6 10^3/UL (ref 4–11)
WHITE BLOOD COUNT: 12.6 10^3/UL (ref 4–11)

## 2024-10-26 RX ADMIN — SOLIFENACIN SUCCINATE 10 MG: 10 TABLET, FILM COATED ORAL at 08:34

## 2024-10-26 RX ADMIN — ASPIRIN 81 MG: 81 TABLET ORAL at 08:33

## 2024-10-26 RX ADMIN — TAMSULOSIN HYDROCHLORIDE 0.4 MG: 0.4 CAPSULE ORAL at 08:33

## 2024-10-26 RX ADMIN — Medication 12.5 MCG: at 08:33

## 2024-10-26 RX ADMIN — AMLODIPINE BESYLATE 10 MG: 5 TABLET ORAL at 08:34

## 2024-10-26 RX ADMIN — AMANTADINE HYDROCHLORIDE 100 MG: 100 CAPSULE ORAL at 08:34

## 2024-10-26 RX ADMIN — POLYETHYLENE GLYCOL 3350 17 GM: 17 POWDER, FOR SOLUTION ORAL at 08:34

## 2024-10-26 NOTE — PC.NURSE
Writer called and set up transportation with [facility] ambulance service. Writer notified Willow at [facility] and attempted to give report to staff there but they do not answer the phone. Willow is  aware that this has been attempted. Writer also contacted son, 
[place] and left voicemail that he will be returning to [facility] this afternoon.

## 2024-10-26 NOTE — P.DS_ITS
DS: Providers    
Provider    
Date of admission:     
10/25/24 09:17    
    
Primary care physician:     
MACIEL MERA    
    
Admitting clinician: Shaikh China    
Consults:     
                                            
    
10/25/24 09:41    
Physical Therapy Eval and Treat Routine     
   Reason for consultation: Ambulatory dysfunction/weakness    
    
    
    
Discharging clinician: Giselle Robison    
    
DS: Diagnosis    
Discharge Diagnosis    
(1) Ureteral stone with hydronephrosis:     
(2) Multiple renal calculi:     
(3) UTI (urinary tract infection):     
      Qualifiers:    
        Hematuria presence: without hematuria  Urinary tract infection type:   
acute cystitis  Qualified Code(s): N30.00 - Acute cystitis without hematuria    
(4) Hypertension:     
      Qualifiers:    
        Hypertension type: primary hypertension  Qualified Code(s): I10 -   
Essential (primary) hypertension    
(5) Parkinson disease:     
      Qualifiers:    
        Dyskinesia presence: with dyskinesia  Fluctuating  manifestations: with   
fluctuating manifestations  Qualified Code(s): G20.B2 - Parkinson's disease with  
dyskinesia, with fluctuations    
(6) CKD stage 3a, GFR 45-59 ml/min:    
    
DS: Summary    
Hospital Course    
Hospital Course:    
89-year-old male with history of Parkinson, hypertension presented to ER with 1   
day history of nausea, left-sided flank pain.  He denied fever, vomiting,   
dysuria, hematuria or diarrhea.  Workup in ER revealed nonobstructing 5 mm stone  
within the distal right ureter and mild left hydronephrosis secondary to a   
partially obstructing 5 mm stone within the mid left ureter.  ER discussed   
patient's CT scan finding and clinical presentation with on-call urology who   
recommended admission for observation and possible intervention. Urology took   
patient to OR last night and placed bilateral JJ stents. Patient tolerated proc  
edure well. No alvarado was placed. Prior urine culture was positive for Ecoli, He   
has been receiving Rocephin. I will place him on Keflex 500mg BID x 7 days. He   
will need a recheck on BMP to make sure kidney function is improving. He will   
have close outpatient follow up with Urology in 1-2 weeks for stent removal and   
further plan of care.     
Status at Discharge    
Functional status at discharge: bed bound    
Overall status at discharge: patient is back to baseline    
Time Spent with Patient    
Time attestation:     
Total time spent providing and/or coordinating discharge services:    
    
Time spent: greater than 30 minutes    
    
Exam    
Narrative    
Exam Narrative:     
General: Patient is alert, and oriented to person, place and time with normal   
affect, proper hygiene     
Skin: no visible rashes, or ulcers    
Head: atraumatic, acephalic    
Eyes: PERRLA, no nystagmus present, conjunctiva clear, no scleral icterus    
Ears:  normal gross auditory acuity     
Heart: Normal rate and rhythm, no murmurs/rubs/gallops    
Lungs: no audible wheezes, crackles and normal breath sounds all lung fields     
Abdomen: Normal audible bowel sounds, no distension, No palpable masses, no   
organomegaly, no rebound/guarding/ or rigidity    
Musculoskeletal: no swelling bilateral lower extremities, upper extremity   
tremors bilaterally    
Neuro: CN II-X grossly intact    
Constitutional    
Vital Signs, click to edit/add:     
                                Last Vital Signs    
    
    
    
Temp  98.2 F   10/26/24 03:00    
     
Pulse  73   10/26/24 03:00    
     
Resp  16   10/26/24 03:00    
     
BP  138/66   10/26/24 03:00    
     
Pulse Ox  91 L  10/26/24 03:00    
     
O2 Del Method  Room Air  10/26/24 03:00    
    
    
    
    
DS: Data    
Data Completed and Pending    
Labs on day of discharge:     
                             Labs from last 24 hours    
    
    
    
  10/26/24    
    
  05:29    
     
WBC  12.6 H    
     
RBC  3.97 L    
     
Hgb  11.8 L    
     
Hct  35.7 L    
     
MCV  89.9    
     
MCH  29.7    
     
MCHC  33.1    
     
RDW  14.9    
     
Plt Count  229    
     
MPV  9.9    
     
Seg Neuts % (Manual)  94.0 H    
     
Lymphocytes % (Manual)  3.0 L    
     
Monocytes % (Manual)  3.0    
     
Eosinophils % (Manual)  0.0 L    
     
Basophils % (Manual)  0.0 L    
     
Neutrophils # (Manual)  11.84 H    
     
Lymphocytes # (Manual)  0.37 L    
     
Monocytes # (Manual)  0.37    
     
Eosinophils # (Manual)  0.00    
     
Basophils # (Manual)  0.00    
     
Sodium  142    
     
Potassium  4.4    
     
Chloride  108 H    
     
Carbon Dioxide  26.1    
     
Anion Gap  12.3    
     
BUN  31.0 H    
     
Creatinine  1.78 H    
     
Est GFR ( Amer)  44 L    
     
Est GFR (Non-Af Amer)  36 L    
     
BUN/Creatinine Ratio  17.4    
     
Glucose  138 H    
     
Calcium  8.8    
     
Total Bilirubin  0.5    
     
AST  13 L    
     
ALT  12 L    
     
Alkaline Phosphatase  74    
     
Total Protein  6.3 L    
     
Albumin  2.8 L    
     
Globulin  3.5    
     
Albumin/Globulin Ratio  0.8    
    
    
    
    
    
Discharge Plan    
Discharge    
Disposition: er LTC    
    
Condition: Good    
    
Discharge Medications:    
New    
  cephalexin 500 mg capsule     
   500 mg PO BID 7 Days Qty: 14 0RF    
    
Continued    
  carbidopa-levodopa  mg tablet extended release     
     PO      
  amantadine HCl 100 mg capsule     
   100 mg PO BID     
  amlodipine 10 mg tablet     
   10 mg PO DAILY     
  aspirin 81 mg capsule     
   81 mg PO DAILY     
  cholecalciferol (vitamin D3) 10 mcg (400 unit) capsule     
   400 unit PO DAILY     
  polyethylene glycol 3350 [Miralax] 17 gram/dose powder     
   17 g PO DAILY     
  tolterodine 4 mg capsule,extended release 24hr     
   4 mg PO DAILY     
    
Print Language: English    
    
Forms:  Portal Instructions    
    
Follow Up Appointments: Please make appointment with Urology in 1-2 weeks for   
Stent removal; please recheck BMP in 1 week      
    
    
Discharge location: [facility]

## 2024-10-26 NOTE — PM.DS1
DS: Providers
Provider
Date of admission: 
10/25/24 09:17

Primary care physician: 
MACIEL MERA

Admitting clinician: Shaikh China
Consults: 


10/25/24 09:41
Physical Therapy Eval and Treat Routine 
 Reason for consultation: Ambulatory dysfunction/weakness



Discharging clinician: Giselle Robison

DS: Diagnosis
Discharge Diagnosis
(1) Ureteral stone with hydronephrosis: 
(2) Multiple renal calculi: 
(3) UTI (urinary tract infection): 
      Qualifiers:
        Hematuria presence: without hematuria  Urinary tract infection type: acute cystitis  Qualified Code(s): N30.00 - Acute cystitis without hematuria
(4) Hypertension: 
      Qualifiers:
        Hypertension type: primary hypertension  Qualified Code(s): I10 - Essential (primary) hypertension
(5) Parkinson disease: 
      Qualifiers:
        Dyskinesia presence: with dyskinesia  Fluctuating  manifestations: with fluctuating manifestations  Qualified Code(s): G20.B2 - Parkinson's disease with dyskinesia, with fluctuations
(6) CKD stage 3a, GFR 45-59 ml/min:

DS: Summary
Hospital Course
Hospital Course:
89-year-old male with history of Parkinson, hypertension presented to ER with 1 day history of nausea, left-sided flank pain.  He denied fever, vomiting, dysuria, hematuria or diarrhea.  Workup in ER revealed nonobstructing 5 mm stone within the 
distal right ureter and mild left hydronephrosis secondary to a partially obstructing 5 mm stone within the mid left ureter.  ER discussed patient's CT scan finding and clinical presentation with on-call urology who recommended admission for 
observation and possible intervention. Urology took patient to OR last night and placed bilateral JJ stents. Patient tolerated procedure well. No alvarado was placed. Prior urine culture was positive for Ecoli, He has been receiving Rocephin. I will 
place him on Keflex 500mg BID x 7 days. He will need a recheck on BMP to make sure kidney function is improving. He will have close outpatient follow up with Urology in 1-2 weeks for stent removal and further plan of care. 
Status at Discharge
Functional status at discharge: bed bound
Overall status at discharge: patient is back to baseline
Time Spent with Patient
Time attestation: 
Total time spent providing and/or coordinating discharge services:

Time spent: greater than 30 minutes

Exam
Narrative
Exam Narrative: 
General: Patient is alert, and oriented to person, place and time with normal affect, proper hygiene 
Skin: no visible rashes, or ulcers
Head: atraumatic, acephalic
Eyes: PERRLA, no nystagmus present, conjunctiva clear, no scleral icterus
Ears:  normal gross auditory acuity 
Heart: Normal rate and rhythm, no murmurs/rubs/gallops
Lungs: no audible wheezes, crackles and normal breath sounds all lung fields 
Abdomen: Normal audible bowel sounds, no distension, No palpable masses, no organomegaly, no rebound/guarding/ or rigidity
Musculoskeletal: no swelling bilateral lower extremities, upper extremity tremors bilaterally
Neuro: CN II-X grossly intact
Constitutional
Vital Signs, click to edit/add: 
Last Vital Signs

Temp  98.2 F   10/26/24 03:00
Pulse  73   10/26/24 03:00
Resp  16   10/26/24 03:00
BP  138/66   10/26/24 03:00
Pulse Ox  91 L  10/26/24 03:00
O2 Del Method  Room Air  10/26/24 03:00



DS: Data
Data Completed and Pending
Labs on day of discharge: 
Labs from last 24 hours

  10/26/24
  05:29
WBC  12.6 H
RBC  3.97 L
Hgb  11.8 L
Hct  35.7 L
MCV  89.9
MCH  29.7
MCHC  33.1
RDW  14.9
Plt Count  229
MPV  9.9
Seg Neuts % (Manual)  94.0 H
Lymphocytes % (Manual)  3.0 L
Monocytes % (Manual)  3.0
Eosinophils % (Manual)  0.0 L
Basophils % (Manual)  0.0 L
Neutrophils # (Manual)  11.84 H
Lymphocytes # (Manual)  0.37 L
Monocytes # (Manual)  0.37
Eosinophils # (Manual)  0.00
Basophils # (Manual)  0.00
Sodium  142
Potassium  4.4
Chloride  108 H
Carbon Dioxide  26.1
Anion Gap  12.3
BUN  31.0 H
Creatinine  1.78 H
Est GFR ( Amer)  44 L
Est GFR (Non-Af Amer)  36 L
BUN/Creatinine Ratio  17.4
Glucose  138 H
Calcium  8.8
Total Bilirubin  0.5
AST  13 L
ALT  12 L
Alkaline Phosphatase  74
Total Protein  6.3 L
Albumin  2.8 L
Globulin  3.5
Albumin/Globulin Ratio  0.8




Discharge Plan
Discharge
Disposition: Xfer LTC

Condition: Good

Discharge Medications:
New
  cephalexin 500 mg capsule 
   500 mg PO BID 7 Days Qty: 14 0RF

Continued
  carbidopa-levodopa  mg tablet extended release 
     PO  
  amantadine HCl 100 mg capsule 
   100 mg PO BID 
  amlodipine 10 mg tablet 
   10 mg PO DAILY 
  aspirin 81 mg capsule 
   81 mg PO DAILY 
  cholecalciferol (vitamin D3) 10 mcg (400 unit) capsule 
   400 unit PO DAILY 
  polyethylene glycol 3350 [Miralax] 17 gram/dose powder 
   17 g PO DAILY 
  tolterodine 4 mg capsule,extended release 24hr 
   4 mg PO DAILY 

Print Language: English

Forms:  Portal Instructions

Follow Up Appointments: Please make appointment with Urology in 1-2 weeks for Stent removal; please recheck BMP in 1 week  


[facility]